# Patient Record
Sex: FEMALE | Race: WHITE | NOT HISPANIC OR LATINO | URBAN - METROPOLITAN AREA
[De-identification: names, ages, dates, MRNs, and addresses within clinical notes are randomized per-mention and may not be internally consistent; named-entity substitution may affect disease eponyms.]

---

## 2017-02-16 ENCOUNTER — APPOINTMENT (RX ONLY)
Dept: URBAN - METROPOLITAN AREA CLINIC 23 | Facility: CLINIC | Age: 42
Setting detail: DERMATOLOGY
End: 2017-02-16

## 2017-02-16 DIAGNOSIS — L81.4 OTHER MELANIN HYPERPIGMENTATION: ICD-10-CM

## 2017-02-16 DIAGNOSIS — D22 MELANOCYTIC NEVI: ICD-10-CM

## 2017-02-16 DIAGNOSIS — L82.1 OTHER SEBORRHEIC KERATOSIS: ICD-10-CM

## 2017-02-16 DIAGNOSIS — L20.89 OTHER ATOPIC DERMATITIS: ICD-10-CM

## 2017-02-16 DIAGNOSIS — D18.0 HEMANGIOMA: ICD-10-CM

## 2017-02-16 PROBLEM — L30.9 DERMATITIS, UNSPECIFIED: Status: ACTIVE | Noted: 2017-02-16

## 2017-02-16 PROBLEM — L29.8 OTHER PRURITUS: Status: ACTIVE | Noted: 2017-02-16

## 2017-02-16 PROBLEM — D18.01 HEMANGIOMA OF SKIN AND SUBCUTANEOUS TISSUE: Status: ACTIVE | Noted: 2017-02-16

## 2017-02-16 PROBLEM — D22.5 MELANOCYTIC NEVI OF TRUNK: Status: ACTIVE | Noted: 2017-02-16

## 2017-02-16 PROBLEM — F41.9 ANXIETY DISORDER, UNSPECIFIED: Status: ACTIVE | Noted: 2017-02-16

## 2017-02-16 PROCEDURE — 99213 OFFICE O/P EST LOW 20 MIN: CPT

## 2017-02-16 PROCEDURE — ? COUNSELING

## 2017-02-16 ASSESSMENT — LOCATION SIMPLE DESCRIPTION DERM
LOCATION SIMPLE: LEFT SHOULDER
LOCATION SIMPLE: RIGHT THIGH
LOCATION SIMPLE: RIGHT SHOULDER
LOCATION SIMPLE: ABDOMEN
LOCATION SIMPLE: LEFT PRETIBIAL REGION
LOCATION SIMPLE: UPPER BACK
LOCATION SIMPLE: LEFT ZYGOMA

## 2017-02-16 ASSESSMENT — LOCATION DETAILED DESCRIPTION DERM
LOCATION DETAILED: LEFT PROXIMAL PRETIBIAL REGION
LOCATION DETAILED: INFERIOR THORACIC SPINE
LOCATION DETAILED: LEFT RIB CAGE
LOCATION DETAILED: PERIUMBILICAL SKIN
LOCATION DETAILED: LEFT CENTRAL ZYGOMA
LOCATION DETAILED: RIGHT POSTERIOR SHOULDER
LOCATION DETAILED: LEFT POSTERIOR SHOULDER
LOCATION DETAILED: RIGHT ANTERIOR DISTAL THIGH

## 2017-02-16 ASSESSMENT — LOCATION ZONE DERM
LOCATION ZONE: TRUNK
LOCATION ZONE: ARM
LOCATION ZONE: FACE
LOCATION ZONE: LEG

## 2017-06-27 ENCOUNTER — APPOINTMENT (RX ONLY)
Dept: URBAN - METROPOLITAN AREA CLINIC 23 | Facility: CLINIC | Age: 42
Setting detail: DERMATOLOGY
End: 2017-06-27

## 2017-06-27 DIAGNOSIS — L57.0 ACTINIC KERATOSIS: ICD-10-CM

## 2017-06-27 DIAGNOSIS — L82.1 OTHER SEBORRHEIC KERATOSIS: ICD-10-CM

## 2017-06-27 PROCEDURE — ? PRESCRIPTION

## 2017-06-27 PROCEDURE — 99212 OFFICE O/P EST SF 10 MIN: CPT

## 2017-06-27 PROCEDURE — ? COUNSELING

## 2017-06-27 RX ORDER — FLUOROURACIL 5 MG/G
CREAM TOPICAL
Qty: 1 | Refills: 2 | Status: ERX | COMMUNITY
Start: 2017-06-27

## 2017-06-27 RX ADMIN — FLUOROURACIL: 5 CREAM TOPICAL at 19:04

## 2017-06-27 ASSESSMENT — LOCATION SIMPLE DESCRIPTION DERM
LOCATION SIMPLE: RIGHT FOREHEAD
LOCATION SIMPLE: RIGHT FOREARM
LOCATION SIMPLE: LEFT FOREHEAD

## 2017-06-27 ASSESSMENT — LOCATION DETAILED DESCRIPTION DERM
LOCATION DETAILED: RIGHT INFERIOR LATERAL FOREHEAD
LOCATION DETAILED: LEFT INFERIOR FOREHEAD
LOCATION DETAILED: RIGHT PROXIMAL DORSAL FOREARM

## 2017-06-27 ASSESSMENT — LOCATION ZONE DERM
LOCATION ZONE: ARM
LOCATION ZONE: FACE

## 2017-10-12 ENCOUNTER — APPOINTMENT (RX ONLY)
Dept: URBAN - METROPOLITAN AREA CLINIC 349 | Facility: CLINIC | Age: 42
Setting detail: DERMATOLOGY
End: 2017-10-12

## 2017-10-12 DIAGNOSIS — L82.1 OTHER SEBORRHEIC KERATOSIS: ICD-10-CM

## 2017-10-12 DIAGNOSIS — L82.0 INFLAMED SEBORRHEIC KERATOSIS: ICD-10-CM

## 2017-10-12 DIAGNOSIS — L57.0 ACTINIC KERATOSIS: ICD-10-CM

## 2017-10-12 DIAGNOSIS — Z71.89 OTHER SPECIFIED COUNSELING: ICD-10-CM

## 2017-10-12 DIAGNOSIS — L21.8 OTHER SEBORRHEIC DERMATITIS: ICD-10-CM

## 2017-10-12 DIAGNOSIS — Z80.8 FAMILY HISTORY OF MALIGNANT NEOPLASM OF OTHER ORGANS OR SYSTEMS: ICD-10-CM

## 2017-10-12 PROCEDURE — 17000 DESTRUCT PREMALG LESION: CPT | Mod: 59

## 2017-10-12 PROCEDURE — 99202 OFFICE O/P NEW SF 15 MIN: CPT | Mod: 25

## 2017-10-12 PROCEDURE — 17110 DESTRUCTION B9 LES UP TO 14: CPT

## 2017-10-12 PROCEDURE — ? LIQUID NITROGEN

## 2017-10-12 PROCEDURE — ? COUNSELING

## 2017-10-12 PROCEDURE — 17003 DESTRUCT PREMALG LES 2-14: CPT

## 2017-10-12 PROCEDURE — ? RECOMMENDATIONS

## 2017-10-12 ASSESSMENT — LOCATION DETAILED DESCRIPTION DERM
LOCATION DETAILED: LEFT INFERIOR LATERAL FOREHEAD
LOCATION DETAILED: RIGHT SUPERIOR MEDIAL FOREHEAD
LOCATION DETAILED: LEFT SUPERIOR PARIETAL SCALP
LOCATION DETAILED: RIGHT FOREHEAD
LOCATION DETAILED: UPPER STERNUM
LOCATION DETAILED: RIGHT INFERIOR MEDIAL MIDBACK
LOCATION DETAILED: RIGHT CENTRAL EYEBROW
LOCATION DETAILED: LEFT SUPERIOR CENTRAL MALAR CHEEK
LOCATION DETAILED: EPIGASTRIC SKIN
LOCATION DETAILED: LEFT SUPERIOR FOREHEAD
LOCATION DETAILED: RIGHT CENTRAL ZYGOMA

## 2017-10-12 ASSESSMENT — LOCATION ZONE DERM
LOCATION ZONE: TRUNK
LOCATION ZONE: FACE
LOCATION ZONE: SCALP

## 2017-10-12 ASSESSMENT — LOCATION SIMPLE DESCRIPTION DERM
LOCATION SIMPLE: RIGHT LOWER BACK
LOCATION SIMPLE: RIGHT ZYGOMA
LOCATION SIMPLE: ABDOMEN
LOCATION SIMPLE: RIGHT EYEBROW
LOCATION SIMPLE: SCALP
LOCATION SIMPLE: LEFT FOREHEAD
LOCATION SIMPLE: RIGHT FOREHEAD
LOCATION SIMPLE: LEFT CHEEK
LOCATION SIMPLE: CHEST

## 2017-10-12 ASSESSMENT — PAIN INTENSITY VAS: HOW INTENSE IS YOUR PAIN 0 BEING NO PAIN, 10 BEING THE MOST SEVERE PAIN POSSIBLE?: NO PAIN

## 2017-10-12 NOTE — PROCEDURE: LIQUID NITROGEN
Medical Necessity Clause: This procedure was medically necessary because the lesions that were treated were:
Number Of Freeze-Thaw Cycles: 2 freeze-thaw cycles
Add 52 Modifier (Optional): no
Detail Level: Detailed
Post-Care Instructions: I reviewed with the patient in detail post-care instructions. Patient is to wear sunprotection, and avoid picking at any of the treated lesions. Pt may apply Vaseline to crusted or scabbing areas.
Include Z78.9 (Other Specified Conditions Influencing Health Status) As An Associated Diagnosis?: Yes
Consent: The patient's consent was obtained including but not limited to risks of crusting, scabbing, blistering, scarring, darker or lighter pigmentary change, recurrence, incomplete removal and infection.
Duration Of Freeze Thaw-Cycle (Seconds): 3
Number Of Freeze-Thaw Cycles: 1 freeze-thaw cycle
Medical Necessity Information: It is in your best interest to select a reason for this procedure from the list below. All of these items fulfill various CMS LCD requirements except the new and changing color options.
Detail Level: Zone

## 2017-11-02 ENCOUNTER — HOSPITAL ENCOUNTER (OUTPATIENT)
Dept: MRI IMAGING | Age: 42
Discharge: HOME | End: 2017-11-02
Attending: OBSTETRICS & GYNECOLOGY
Payer: COMMERCIAL

## 2017-11-02 DIAGNOSIS — R92.2 DENSE BREASTS: ICD-10-CM

## 2017-11-02 DIAGNOSIS — Z80.3 FAMILY HISTORY OF BREAST CANCER: ICD-10-CM

## 2017-11-02 PROCEDURE — A9577 INJ MULTIHANCE: HCPCS

## 2017-11-02 PROCEDURE — 74011250636 HC RX REV CODE- 250/636

## 2017-11-02 PROCEDURE — 77059 MRI BREAST BI W WO CONT: CPT

## 2017-11-02 PROCEDURE — 74011000258 HC RX REV CODE- 258

## 2017-11-02 RX ORDER — SODIUM CHLORIDE 0.9 % (FLUSH) 0.9 %
10 SYRINGE (ML) INJECTION
Status: COMPLETED | OUTPATIENT
Start: 2017-11-02 | End: 2017-11-02

## 2017-11-02 RX ADMIN — Medication 10 ML: at 12:33

## 2017-11-02 RX ADMIN — SODIUM CHLORIDE 100 ML: 900 INJECTION, SOLUTION INTRAVENOUS at 12:33

## 2017-11-02 RX ADMIN — GADOBENATE DIMEGLUMINE 14 ML: 529 INJECTION, SOLUTION INTRAVENOUS at 12:33

## 2018-10-01 ENCOUNTER — APPOINTMENT (RX ONLY)
Dept: URBAN - METROPOLITAN AREA CLINIC 349 | Facility: CLINIC | Age: 43
Setting detail: DERMATOLOGY
End: 2018-10-01

## 2018-10-01 DIAGNOSIS — L57.0 ACTINIC KERATOSIS: ICD-10-CM

## 2018-10-01 DIAGNOSIS — Z80.8 FAMILY HISTORY OF MALIGNANT NEOPLASM OF OTHER ORGANS OR SYSTEMS: ICD-10-CM

## 2018-10-01 DIAGNOSIS — L82.0 INFLAMED SEBORRHEIC KERATOSIS: ICD-10-CM

## 2018-10-01 DIAGNOSIS — L21.8 OTHER SEBORRHEIC DERMATITIS: ICD-10-CM

## 2018-10-01 DIAGNOSIS — L82.1 OTHER SEBORRHEIC KERATOSIS: ICD-10-CM

## 2018-10-01 PROCEDURE — 17003 DESTRUCT PREMALG LES 2-14: CPT

## 2018-10-01 PROCEDURE — 17000 DESTRUCT PREMALG LESION: CPT | Mod: 59

## 2018-10-01 PROCEDURE — ? COUNSELING

## 2018-10-01 PROCEDURE — ? LIQUID NITROGEN

## 2018-10-01 PROCEDURE — 99213 OFFICE O/P EST LOW 20 MIN: CPT | Mod: 25

## 2018-10-01 PROCEDURE — ? PRESCRIPTION

## 2018-10-01 PROCEDURE — 17110 DESTRUCTION B9 LES UP TO 14: CPT

## 2018-10-01 RX ORDER — KETOCONAZOLE 20.5 MG/ML
SHAMPOO, SUSPENSION TOPICAL
Qty: 1 | Refills: 4 | Status: ERX | COMMUNITY
Start: 2018-10-01

## 2018-10-01 RX ADMIN — KETOCONAZOLE: 20.5 SHAMPOO, SUSPENSION TOPICAL at 13:20

## 2018-10-01 ASSESSMENT — LOCATION SIMPLE DESCRIPTION DERM
LOCATION SIMPLE: CHEST
LOCATION SIMPLE: RIGHT TEMPLE
LOCATION SIMPLE: RIGHT ANTERIOR NECK
LOCATION SIMPLE: RIGHT THIGH
LOCATION SIMPLE: LEFT FOREHEAD
LOCATION SIMPLE: RIGHT THIGH
LOCATION SIMPLE: RIGHT SCALP
LOCATION SIMPLE: LEFT CHEEK
LOCATION SIMPLE: RIGHT ZYGOMA
LOCATION SIMPLE: CHEST
LOCATION SIMPLE: RIGHT FOREHEAD
LOCATION SIMPLE: SCALP
LOCATION SIMPLE: LEFT SCALP

## 2018-10-01 ASSESSMENT — LOCATION DETAILED DESCRIPTION DERM
LOCATION DETAILED: RIGHT SUPERIOR PARIETAL SCALP
LOCATION DETAILED: RIGHT ANTERIOR DISTAL THIGH
LOCATION DETAILED: RIGHT CENTRAL ZYGOMA
LOCATION DETAILED: LEFT INFERIOR CENTRAL MALAR CHEEK
LOCATION DETAILED: LEFT MEDIAL FOREHEAD
LOCATION DETAILED: RIGHT CLAVICULAR NECK
LOCATION DETAILED: STERNAL NOTCH
LOCATION DETAILED: RIGHT MEDIAL FOREHEAD
LOCATION DETAILED: RIGHT ANTERIOR PROXIMAL THIGH
LOCATION DETAILED: UPPER STERNUM
LOCATION DETAILED: RIGHT INFERIOR FOREHEAD
LOCATION DETAILED: LEFT MEDIAL SUPERIOR CHEST
LOCATION DETAILED: LEFT FOREHEAD
LOCATION DETAILED: LEFT INFERIOR MEDIAL FOREHEAD
LOCATION DETAILED: RIGHT MEDIAL SUPERIOR CHEST
LOCATION DETAILED: LEFT CENTRAL FRONTAL SCALP
LOCATION DETAILED: RIGHT ANTERIOR PROXIMAL THIGH
LOCATION DETAILED: RIGHT MEDIAL FRONTAL SCALP
LOCATION DETAILED: UPPER STERNUM
LOCATION DETAILED: RIGHT MID TEMPLE

## 2018-10-01 ASSESSMENT — LOCATION ZONE DERM
LOCATION ZONE: TRUNK
LOCATION ZONE: NECK
LOCATION ZONE: LEG
LOCATION ZONE: SCALP
LOCATION ZONE: FACE
LOCATION ZONE: TRUNK
LOCATION ZONE: LEG

## 2018-10-01 NOTE — PROCEDURE: LIQUID NITROGEN
Number Of Freeze-Thaw Cycles: 2 freeze-thaw cycles
Consent: The patient's consent was obtained including but not limited to risks of crusting, scabbing, blistering, scarring, darker or lighter pigmentary change, recurrence, incomplete removal and infection.
Duration Of Freeze Thaw-Cycle (Seconds): 3
Medical Necessity Clause: This procedure was medically necessary because the lesions that were treated were:
Post-Care Instructions: I reviewed with the patient in detail post-care instructions. Patient is to wear sunprotection, and avoid picking at any of the treated lesions. Pt may apply Vaseline to crusted or scabbing areas.
Number Of Freeze-Thaw Cycles: 1 freeze-thaw cycle
Detail Level: Zone
Render Post-Care Instructions In Note?: no
Medical Necessity Information: It is in your best interest to select a reason for this procedure from the list below. All of these items fulfill various CMS LCD requirements except the new and changing color options.
Include Z78.9 (Other Specified Conditions Influencing Health Status) As An Associated Diagnosis?: Yes
Detail Level: Detailed

## 2019-01-02 RX ORDER — SODIUM CHLORIDE 0.9 % (FLUSH) 0.9 %
5-10 SYRINGE (ML) INJECTION AS NEEDED
Status: CANCELLED | OUTPATIENT
Start: 2019-01-02

## 2019-01-02 RX ORDER — SODIUM CHLORIDE 0.9 % (FLUSH) 0.9 %
5-10 SYRINGE (ML) INJECTION EVERY 8 HOURS
Status: CANCELLED | OUTPATIENT
Start: 2019-01-02

## 2019-01-07 NOTE — H&P
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 She complains of pain around the base of the left thumb, which has bothered her for several years, but has gotten progressively worse, particularly over the last year. It hurts with grasping, gripping and twisting. She has tried using a splint some in the past.  She denies any triggering or catching of the thumb or any of the fingers. She is not having any numbness or tingling in the hand. She has not had any specific injuries. She saw Dr. Kristen Rueda at the [facility] [place] and is here for another evaluation or opinion. PMH, SH & ROS:  This form has been filled out, reviewed, signed and placed in the patient's chart. I reviewed pertinent positive and negative responses and tried to associate them with the musculoskeletal problem of the patient. Other positive responses were deferred to the patient's primary care physician. Review of systems is negative. The patient drinks some. She does not smoke. She is generally healthy. She does have a history of some arthritis. She has had a breast augmentation in 2013 and a  in 2006. CURRENT MEDICATIONS:  None. ALLERGIES:  None. PE:  The patient is an alert and oriented, generally healthy-appearing, middle-aged, white female. Exam of the left wrist and hand shows the patient to have dorsal subluxation and prominence at the base of the thumb metacarpal with positive grind test and tenderness to manipulation and palpation of the TM joint. Her MP joint is stable and does not hyperextend. She has no triggering of the thumb. Heavenly Duverney test is negative. The wrist has good flexion and extension. Fingers all flex and extend fully without any triggering or catching.   Circulation is good with a palpable radial pulse at the left wrist.  Neurologic exam is intact to light touch in the radial, ulnar and median nerve distributions. Skin and nails are normal.      X-RAYS:  AP, lateral, and Elvis view of the left wrist today, 11/29/2018, show the patient to have significant arthritic change of the left TM joint. There is dorsal subluxation, joint narrowing, and spurring around the joint. The remainder of the wrist and hand appears normal without significant arthritic change. Radiographic Impression:  Moderately severe trapeziometacarpal arthritis of the left trapeziometacarpal (TM)  joint. DISPOSITION:  The problem was discussed with the patient along with various treatment alternatives including possibly oral anti-inflammatories, topical NSAIDs such as Voltaren Gel, splinting, steroid injections, and possibly surgery. The patient has already decided that she does not want to really pursue serial steroid injections. She has tried the brace and splinting and finds that inconvenient. She is considering surgical treatment. I have discussed the procedure with her, doing this as an outpatient under general and axillary block, and that the recovery time is approximately 3 months or so. She is going to think about whether or not to proceed with surgery. If she decides she would like to do so with me, she is going to call back and schedule that with my medical assistant.

## 2019-01-10 ENCOUNTER — ANESTHESIA EVENT (OUTPATIENT)
Dept: SURGERY | Age: 44
End: 2019-01-10
Payer: COMMERCIAL

## 2019-01-11 ENCOUNTER — ANESTHESIA (OUTPATIENT)
Dept: SURGERY | Age: 44
End: 2019-01-11
Payer: COMMERCIAL

## 2019-01-11 ENCOUNTER — HOSPITAL ENCOUNTER (OUTPATIENT)
Age: 44
Setting detail: OUTPATIENT SURGERY
Discharge: HOME | End: 2019-01-11
Attending: ORTHOPAEDIC SURGERY | Admitting: ORTHOPAEDIC SURGERY
Payer: COMMERCIAL

## 2019-01-11 VITALS
TEMPERATURE: 97.8 F | RESPIRATION RATE: 16 BRPM | HEART RATE: 57 BPM | WEIGHT: 149 LBS | OXYGEN SATURATION: 95 % | SYSTOLIC BLOOD PRESSURE: 107 MMHG | BODY MASS INDEX: 25.58 KG/M2 | DIASTOLIC BLOOD PRESSURE: 63 MMHG

## 2019-01-11 DIAGNOSIS — G89.18 POST-OP PAIN: Primary | ICD-10-CM

## 2019-01-11 LAB
HCG UR QL: NEGATIVE
HCG UR QL: NEGATIVE

## 2019-01-11 PROCEDURE — 77030004434 HC BUR RND STRY -B: Performed by: ORTHOPAEDIC SURGERY

## 2019-01-11 PROCEDURE — 74011250636 HC RX REV CODE- 250/636: Performed by: ANESTHESIOLOGY

## 2019-01-11 PROCEDURE — 76210000020 HC REC RM PH II FIRST 0.5 HR: Performed by: ORTHOPAEDIC SURGERY

## 2019-01-11 PROCEDURE — 77030018836 HC SOL IRR NACL ICUM -A: Performed by: ORTHOPAEDIC SURGERY

## 2019-01-11 PROCEDURE — 76010010054 HC POST OP PAIN BLOCK: Performed by: ORTHOPAEDIC SURGERY

## 2019-01-11 PROCEDURE — 77030002933 HC SUT MCRYL J&J -A: Performed by: ORTHOPAEDIC SURGERY

## 2019-01-11 PROCEDURE — 77030032296: Performed by: ORTHOPAEDIC SURGERY

## 2019-01-11 PROCEDURE — 77030002916 HC SUT ETHLN J&J -A: Performed by: ORTHOPAEDIC SURGERY

## 2019-01-11 PROCEDURE — 77030006788 HC BLD SAW OSC STRY -B: Performed by: ORTHOPAEDIC SURGERY

## 2019-01-11 PROCEDURE — 76010000965 HC SPECIAL PROCEDURE PERI-OP: Performed by: ORTHOPAEDIC SURGERY

## 2019-01-11 PROCEDURE — 81025 URINE PREGNANCY TEST: CPT

## 2019-01-11 PROCEDURE — 77030002966 HC SUT PDS J&J -A: Performed by: ORTHOPAEDIC SURGERY

## 2019-01-11 PROCEDURE — 77030025157 HC CUF TRNQT 1 ZIMM -B: Performed by: ORTHOPAEDIC SURGERY

## 2019-01-11 PROCEDURE — 76010000161 HC OR TIME 1 TO 1.5 HR INTENSV-TIER 1: Performed by: ORTHOPAEDIC SURGERY

## 2019-01-11 PROCEDURE — 77030003602 HC NDL NRV BLK BBMI -B: Performed by: ANESTHESIOLOGY

## 2019-01-11 PROCEDURE — 76210000063 HC OR PH I REC FIRST 0.5 HR: Performed by: ORTHOPAEDIC SURGERY

## 2019-01-11 PROCEDURE — 76060000033 HC ANESTHESIA 1 TO 1.5 HR: Performed by: ORTHOPAEDIC SURGERY

## 2019-01-11 PROCEDURE — 74011250636 HC RX REV CODE- 250/636: Performed by: ORTHOPAEDIC SURGERY

## 2019-01-11 PROCEDURE — 77030002912 HC SUT ETHBND J&J -A: Performed by: ORTHOPAEDIC SURGERY

## 2019-01-11 PROCEDURE — 74011250636 HC RX REV CODE- 250/636

## 2019-01-11 PROCEDURE — A4565 SLINGS: HCPCS | Performed by: ORTHOPAEDIC SURGERY

## 2019-01-11 RX ORDER — HYDROMORPHONE HYDROCHLORIDE 2 MG/ML
0.5 INJECTION, SOLUTION INTRAMUSCULAR; INTRAVENOUS; SUBCUTANEOUS
Status: DISCONTINUED | OUTPATIENT
Start: 2019-01-11 | End: 2019-01-11 | Stop reason: HOSPADM

## 2019-01-11 RX ORDER — LIDOCAINE HYDROCHLORIDE 20 MG/ML
INJECTION, SOLUTION EPIDURAL; INFILTRATION; INTRACAUDAL; PERINEURAL AS NEEDED
Status: DISCONTINUED | OUTPATIENT
Start: 2019-01-11 | End: 2019-01-11 | Stop reason: HOSPADM

## 2019-01-11 RX ORDER — OXYCODONE HYDROCHLORIDE 5 MG/1
10 TABLET ORAL
Status: DISCONTINUED | OUTPATIENT
Start: 2019-01-11 | End: 2019-01-11 | Stop reason: HOSPADM

## 2019-01-11 RX ORDER — OXYCODONE HYDROCHLORIDE 5 MG/1
5 TABLET ORAL
Status: DISCONTINUED | OUTPATIENT
Start: 2019-01-11 | End: 2019-01-11 | Stop reason: HOSPADM

## 2019-01-11 RX ORDER — SODIUM CHLORIDE, SODIUM LACTATE, POTASSIUM CHLORIDE, CALCIUM CHLORIDE 600; 310; 30; 20 MG/100ML; MG/100ML; MG/100ML; MG/100ML
75 INJECTION, SOLUTION INTRAVENOUS CONTINUOUS
Status: DISCONTINUED | OUTPATIENT
Start: 2019-01-11 | End: 2019-01-11 | Stop reason: HOSPADM

## 2019-01-11 RX ORDER — NALOXONE HYDROCHLORIDE 0.4 MG/ML
0.1 INJECTION, SOLUTION INTRAMUSCULAR; INTRAVENOUS; SUBCUTANEOUS
Status: DISCONTINUED | OUTPATIENT
Start: 2019-01-11 | End: 2019-01-11 | Stop reason: HOSPADM

## 2019-01-11 RX ORDER — CEFAZOLIN SODIUM/WATER 2 G/20 ML
2 SYRINGE (ML) INTRAVENOUS ONCE
Status: COMPLETED | OUTPATIENT
Start: 2019-01-11 | End: 2019-01-11

## 2019-01-11 RX ORDER — FENTANYL CITRATE 50 UG/ML
100 INJECTION, SOLUTION INTRAMUSCULAR; INTRAVENOUS ONCE
Status: COMPLETED | OUTPATIENT
Start: 2019-01-11 | End: 2019-01-11

## 2019-01-11 RX ORDER — PROPOFOL 10 MG/ML
INJECTION, EMULSION INTRAVENOUS
Status: DISCONTINUED | OUTPATIENT
Start: 2019-01-11 | End: 2019-01-11 | Stop reason: HOSPADM

## 2019-01-11 RX ORDER — LIDOCAINE HYDROCHLORIDE 10 MG/ML
0.1 INJECTION INFILTRATION; PERINEURAL AS NEEDED
Status: DISCONTINUED | OUTPATIENT
Start: 2019-01-11 | End: 2019-01-11 | Stop reason: HOSPADM

## 2019-01-11 RX ORDER — FLUMAZENIL 0.1 MG/ML
0.2 INJECTION INTRAVENOUS AS NEEDED
Status: DISCONTINUED | OUTPATIENT
Start: 2019-01-11 | End: 2019-01-11 | Stop reason: HOSPADM

## 2019-01-11 RX ORDER — DIPHENHYDRAMINE HYDROCHLORIDE 50 MG/ML
12.5 INJECTION, SOLUTION INTRAMUSCULAR; INTRAVENOUS
Status: DISCONTINUED | OUTPATIENT
Start: 2019-01-11 | End: 2019-01-11 | Stop reason: HOSPADM

## 2019-01-11 RX ORDER — MIDAZOLAM HYDROCHLORIDE 1 MG/ML
2 INJECTION, SOLUTION INTRAMUSCULAR; INTRAVENOUS ONCE
Status: COMPLETED | OUTPATIENT
Start: 2019-01-11 | End: 2019-01-11

## 2019-01-11 RX ORDER — MIDAZOLAM HYDROCHLORIDE 1 MG/ML
2 INJECTION, SOLUTION INTRAMUSCULAR; INTRAVENOUS
Status: DISCONTINUED | OUTPATIENT
Start: 2019-01-11 | End: 2019-01-11 | Stop reason: HOSPADM

## 2019-01-11 RX ORDER — HYDROMORPHONE HYDROCHLORIDE 2 MG/1
2 TABLET ORAL
Qty: 30 TAB | Refills: 0 | Status: SHIPPED | OUTPATIENT
Start: 2019-01-11 | End: 2021-03-04 | Stop reason: CLARIF

## 2019-01-11 RX ADMIN — PROPOFOL 140 MCG/KG/MIN: 10 INJECTION, EMULSION INTRAVENOUS at 14:02

## 2019-01-11 RX ADMIN — Medication 2 G: at 14:00

## 2019-01-11 RX ADMIN — MIDAZOLAM HYDROCHLORIDE 2 MG: 2 INJECTION, SOLUTION INTRAMUSCULAR; INTRAVENOUS at 12:48

## 2019-01-11 RX ADMIN — SODIUM CHLORIDE, SODIUM LACTATE, POTASSIUM CHLORIDE, AND CALCIUM CHLORIDE 75 ML/HR: 600; 310; 30; 20 INJECTION, SOLUTION INTRAVENOUS at 12:05

## 2019-01-11 RX ADMIN — LIDOCAINE HYDROCHLORIDE 60 MG: 20 INJECTION, SOLUTION EPIDURAL; INFILTRATION; INTRACAUDAL; PERINEURAL at 14:02

## 2019-01-11 RX ADMIN — FENTANYL CITRATE 100 MCG: 50 INJECTION INTRAMUSCULAR; INTRAVENOUS at 12:48

## 2019-01-11 NOTE — ANESTHESIA PREPROCEDURE EVALUATION
Anesthetic History No history of anesthetic complications Review of Systems / Medical History Patient summary reviewed and pertinent labs reviewed Pulmonary Within defined limits Neuro/Psych Within defined limits Cardiovascular Within defined limits Exercise tolerance: >4 METS 
  
GI/Hepatic/Renal 
Within defined limits Endo/Other Arthritis Other Findings Physical Exam 
 
Airway Mallampati: I 
TM Distance: 4 - 6 cm Neck ROM: normal range of motion Mouth opening: Normal 
 
 Cardiovascular Rhythm: regular Rate: normal 
 
 
 
 Dental 
No notable dental hx Pulmonary Breath sounds clear to auscultation Abdominal 
 
 
 
 Other Findings Anesthetic Plan ASA: 1 Anesthesia type: total IV anesthesia Post-op pain plan if not by surgeon: peripheral nerve block single Anesthetic plan and risks discussed with: Patient and Spouse

## 2019-01-11 NOTE — ANESTHESIA POSTPROCEDURE EVALUATION
Procedure(s): LEFT WRIST TRAPEZIO METACARPAL ARTHROPLASTY WITH FCR TENDON TRANSFER. Anesthesia Post Evaluation Multimodal analgesia: multimodal analgesia used between 6 hours prior to anesthesia start to PACU discharge Patient location during evaluation: bedside Patient participation: complete - patient participated Level of consciousness: awake and responsive to light touch Pain management: adequate Airway patency: patent Anesthetic complications: no 
Cardiovascular status: acceptable, hemodynamically stable, blood pressure returned to baseline and stable Respiratory status: acceptable, unassisted, spontaneous ventilation and nonlabored ventilation Hydration status: acceptable Post anesthesia nausea and vomiting:  controlled Visit Vitals /63 (BP 1 Location: Right arm, BP Patient Position: At rest) Pulse 80 Temp 36.6 °C (97.8 °F) Resp 17 Wt 67.6 kg (149 lb) SpO2 99% BMI 25.58 kg/m²

## 2019-01-11 NOTE — DISCHARGE INSTRUCTIONS
POST OP INSTRUCTIONS      1. Patient has appointment for 1/21/19 at 2:40 PM at the [facility]. 2.  For problems call Dr Ellis Horne, [facility],  [phone]          Appointments only,  [phone]    3. Ice and elevate the surgical site. 4.  Keep splints and dressing dry and intact. 5.  If able to tolerate, take   Acetaminophen 325 mg  2 every 4 hrs   And                                               Ibuprofen 200 mg   3 every 6 hrs   OR   Aleve 220 mg 2 every 12 hrs to help with pain                                                   ( Do not take Ibuprofen or Aleve if taking any other anti inflamatory                                                    drug, NSAID, or anti arthritis drug or if taking blood thinners.)                                                 Vitamin C   500 mg  Once a day for 2 months. ACTIVITY  · As tolerated and as directed by your doctor. · Bathe or shower as directed by your doctor. DIET  · Clear liquids until no nausea or vomiting; then light diet for the first day. · Advance to regular diet on second day, unless your doctor orders otherwise. · If nausea and vomiting continues, call your doctor. PAIN  · Take pain medication as directed by your doctor. · Call your doctor if pain is NOT relieved by medication. · DO NOT take aspirin of blood thinners unless directed by your doctor. DRESSING CARE       CALL YOUR DOCTOR IF   · Excessive bleeding that does not stop after holding pressure over the area  · Temperature of 101 degrees F or above  · Excessive redness, swelling or bruising, and/ or green or yellow, smelly discharge from incision    AFTER ANESTHESIA   · For the first 24 hours: DO NOT Drive, Drink alcoholic beverages, or Make important decisions. · Be aware of dizziness following anesthesia and while taking pain medication.      APPOINTMENT DATE/ TIME    YOUR DOCTOR'S PHONE NUMBER       DISCHARGE SUMMARY from Nurse    PATIENT INSTRUCTIONS:    After general anesthesia or intravenous sedation, for 24 hours or while taking prescription Narcotics:  · Limit your activities  · Do not drive and operate hazardous machinery  · Do not make important personal or business decisions  · Do  not drink alcoholic beverages  · If you have not urinated within 8 hours after discharge, please contact your surgeon on call. *  Please give a list of your current medications to your Primary Care Provider. *  Please update this list whenever your medications are discontinued, doses are      changed, or new medications (including over-the-counter products) are added. *  Please carry medication information at all times in case of emergency situations. These are general instructions for a healthy lifestyle:    No smoking/ No tobacco products/ Avoid exposure to second hand smoke    Surgeon General's Warning:  Quitting smoking now greatly reduces serious risk to your health. Obesity, smoking, and sedentary lifestyle greatly increases your risk for illness    A healthy diet, regular physical exercise & weight monitoring are important for maintaining a healthy lifestyle    You may be retaining fluid if you have a history of heart failure or if you experience any of the following symptoms:  Weight gain of 3 pounds or more overnight or 5 pounds in a week, increased swelling in our hands or feet or shortness of breath while lying flat in bed. Please call your doctor as soon as you notice any of these symptoms; do not wait until your next office visit. Recognize signs and symptoms of STROKE:    F-face looks uneven    A-arms unable to move or move unevenly    S-speech slurred or non-existent    T-time-call 911 as soon as signs and symptoms begin-DO NOT go       Back to bed or wait to see if you get better-TIME IS BRAIN.

## 2019-01-11 NOTE — ANESTHESIA PROCEDURE NOTES
Peripheral Block Start time: 1/11/2019 12:49 PM 
End time: 1/11/2019 12:55 PM 
Performed by: Tori Moss MD 
Authorized by: Tori Moss MD  
 
 
Pre-procedure: Indications: at surgeon's request and post-op pain management Preanesthetic Checklist: patient identified, risks and benefits discussed, site marked, timeout performed, anesthesia consent given and patient being monitored Timeout Time: 12:48 Block Type:  
Block Type:  Supraclavicular Laterality:  Left Monitoring:  Standard ASA monitoring, oxygen, responsive to questions, continuous pulse ox, frequent vital sign checks and heart rate Injection Technique:  Single shot Procedures: ultrasound guided and nerve stimulator Patient Position: supine (30 degree upright) Prep: chlorhexidine Location:  Supraclavicular Needle Type:  Stimuplex Needle Gauge:  22 G Needle Localization:  Nerve stimulator and ultrasound guidance Motor Response: minimal motor response >0.4 mA Motor Response comment:  Twitch extinguished between 0.5-0.2 mA Assessment: 
Number of attempts:  1 Injection Assessment:  Incremental injection every 5 mL, negative aspiration for CSF, no paresthesia, ultrasound image on chart, local visualized surrounding nerve on ultrasound, negative aspiration for blood and no intravascular symptoms Patient tolerance:  Patient tolerated the procedure well with no immediate complications

## 2019-01-11 NOTE — OP NOTES
Trapezio-metacarpal Arthroplasty    Bria Mcdaniel        1/11/2019    Indications: This is a 37 yrs female with degenerative arthritis of the  left trapezio-metacarpal joint. The patient is admitted for surgery as conservative measures have failed. Pre-operative Diagnosis:  Unilateral primary osteoarthritis of first carpometacarpal joint, left hand [M18.12]    Post-operative Diagnosis: Unilateral primary osteoarthritis of first carpometacarpal joint, left hand [M18.12]    Procedure: 1. LEFT  TRAPEZIO-METACARPAL ARTHROPLASTY                       2.  TRANSFER OF THE LEFT  FLEXOR CARPI RADIALIS TENDON    Surgeon: Anna Louis MD    Anesthesia: Regional      Procedure Details: The patient was given a brachial plexus block in the pre-op area and then  taken to the operating suite. The patient was given pre-op intravenous Ancef. The left upper extremity was then prepped with Chloro-prep and draped in the usual sterile fashion. A time out was performed by the surgical team identifying the patient, surgeon, procedure and site. The extremity was exsanguinated and a tourniquet inflated to 250 mm Hg around the upper arm. A curved incision was made along the radial aspect of the thumb metacarpal and then turned ulnarward at the wrist flexion crease to the FCR and then proximal for 1 cm. Small bleeders were electrocoagulated. The thenar muscles were retracted volarly. The periosteum and TM capsule was open along the radial aspect and retracted dorsally and volarly. The capsule was released off the trapezium both dorsally and volarly as far as possible. The FCR tendon was released from the bony overhang and protected. The trapezium was cut into 4 quadrants with a small oscillating saw and then removed piecemeal by releasing the deep ligament attachments as the bone was removed. The trapezium was noted to have arthritic spurring and loss of articular cartilage from the distal surface. The base of the thumb metacarpal showed loss of jt cartilage. The soft tissues were further stripped off the doral aspect of the base of the metacarpal and then a small cielo used to make an oval window in the dorsal cortex near the proximal edge and out thru the intramedullary canal.  Osteophytes were removed from the base of the metacarpal. The wound was irrigated to remove bone debris. The FCR was harvested. A 2.0 cm incision was made over the musculotendonous junction and the FCR identified. A 0 PDS suture was passed through the tendon and then the two free ends of the suture passed beneath the sheath into the distal incision with a [place] 100e tendon passer. The tendon was split longitudinally by pulling on the ends of the PDS suture. The Ulnar half of the tendon was transected proximally and the free end of the tendon pulled into the distal incision. The tendon was split to its insertion on the base of the index metacarpal.  Using a tag stitch through the end of the tendon it was passed through the base of the metacarpal and out through the dorsal window. The assistant held the thumb abducted and distracted. The free end of the tendon was pulled proximally and sutured to itself with 3 horizontal mattress stitches of 2- 0 Ethibond. The remaining portion of the tendon was sutured into a bolus with 2-0 PDS and placed into the space where the trapezium had been. The wound was again irrigated. The joint capsule and ligaments were then closed snuggly around the base of the thumb metacarpal with inverted 3-0 Ethibond and  2-0 PDS sutures. The thenar muscles were repaired along with the FCR sheath. The skin was closed with 4-0 nylon. . A sterile bulky dressing was applied. The tourniquet was released. The patient was then transferred to the Recovery Room in stable condition.       Findings: Marked degenerative changes of the TM joint with dorsal subluxation, spurring and loss of articular cartilage.     Tourniquet Time:   Total Tourniquet Time Documented:  Upper Arm (Left) - 59 minutes  Total: Upper Arm (Left) - 59 minutes      Signed By: Anna Louis MD

## 2019-01-11 NOTE — INTERVAL H&P NOTE
H&P Update:  Gloria Galvez was seen and examined. Pt is alert and oriented. Chest: Clear w/o SOB. C/V:  RR&R    History and physical has been reviewed. The patient has been examined. There have been no significant clinical changes since the completion of the originally dated History and Physical.  Patient identified by surgeon; surgical site was confirmed by patient and surgeon. The patient is to have a Left TM arthroplasty with FCR tendon transfer.     Signed By: Ronna Villagomez., MD     January 11, 2019 1:04 PM

## 2019-02-11 ENCOUNTER — HOSPITAL ENCOUNTER (OUTPATIENT)
Dept: PHYSICAL THERAPY | Age: 44
Discharge: HOME | End: 2019-02-11
Payer: COMMERCIAL

## 2019-02-11 PROCEDURE — 97760 ORTHOTIC MGMT&TRAING 1ST ENC: CPT

## 2019-02-11 PROCEDURE — 97165 OT EVAL LOW COMPLEX 30 MIN: CPT

## 2019-02-11 NOTE — THERAPY EVALUATION
Zoie Bryan : 1975 Primary: [facility] Secondary:  [facility] [place] [id number] [facility], [address], [place] [address]. [phone]   [phone] OUTPATIENT OCCUPATIONAL THERAPY: Initial Assessment 2019 ICD-10: Treatment Diagnosis: Stiffness of left hand, not elsewhere classified (M25.642)Pain in left hand (S07.102) Precautions/Allergies:  
Patient has no known allergies. MD Orders: Evaluate and treat: custom molded TMA splint, ROM MEDICAL/REFERRING DIAGNOSIS:  
Unilateral primary osteoarthritis of first carpometacarpal joint, left hand [M18.12] DATE OF ONSET: several years ago DATE OF SURGERY: 2019 REFERRING PHYSICIAN: Clifford Arce MD 
RETURN PHYSICIAN APPOINTMENT: 4 weeks INITIAL ASSESSMENT:  Ms. Sherine Giordano presents with decreased functional use, strength and range of motion of her left hand and upper extremity that is affecting her independence with activities of daily living and ability to perform job tasks. I feel that Ms. Sherine Giordano will benefit from skilled occupational therapy to maximize the functional use of her hand and upper extremity in daily activities . PLAN OF CARE:  
PROBLEM LIST: 
1. Pain in left hand. 2. Decreased motion in left hand. 3. Decreased strength in left hand. INTERVENTIONS PLANNED: 
1. Modalities that may include fluidotherapy, paraffin, ultrasound, and light therapy. 2. Therapeutic exercise including a home exercise program. 
3. Manual therapy. 4. Therapeutic activities. TREATMENT PLAN: 
Effective Dates: 2019 TO 2019 (90 days). Frequency/Duration: 2 times a week for 90 DaysGOALS: (Goals have been discussed and agreed upon with patient.) Short-Term Functional Goals: Time Frame: 4 weeks 1. Decrease pain to 5 to allow patient to perform self care tasks.  
2. Increase motion in left hand by 20 degrees to improve functional use of upper extremity in ADL activities. 3. Increase strength in left hand by 10 pounds to allow patient to  and lift objects during self care activities. Discharge Goals: Time Frame: 12 weeks 1. Decrease pain to 2 to allow patient to perform all household  tasks. 2. Increase motion in left hand by 30 degreees to allow patient to perform all ADL activities. 3. Increase strength in left hand by 20 pounds to allow patient to , lift, hold, and carry heavy objects. Rehabilitation Potential For Stated Goals: Good Regarding Javi Asif Marcelo Cuba's therapy, I certify that the treatment plan above will be carried out by a therapist or under their direction. Thank you for this referral, Alexander Gomez, OT Referring Physician Signature: Bill Cazares MD _________________________  Date _________ The information in this section was collected on 2019 (except where otherwise noted). OCCUPATIONAL PROFILE & HISTORY:  
History of Present Injury/Illness (Reason for Referral): The patient has had increasing arthritis pain in her left thumb for several years. Past Medical History/Comorbidities: Ms. Theodore Banks  has a past medical history of Allergic rhinitis, Anxiety, Arthritis, Chicken pox, and Pneumonia. Ms. Theodore Banks  has a past surgical history that includes radha biopsy breast stereotactic (); hx  section (); hx breast biopsy (Right); hx breast augmentation; and implant breast silicone/eq. Social History/Living Environment:  
Home Environment: Private residence Prior Level of Function/Work/Activity: 
independent Dominant Side:  
      RIGHT Ambulatory/Rehab Services H2 Model Falls Risk Assessment Risk Factors: 
     No Risk Factors Identified Ability to Rise from Chair: 
     (0)  Ability to rise in a single movement Falls Prevention Plan: No modifications necessary Total: (5 or greater = High Risk): 0 ©2010 [facility] [place] [facility] Patent #2,209,476. Federal Law prohibits the replication, distribution or use without written permission from [facility] of [employer] Current Medications:   
Current Outpatient Medications:  
  HYDROmorphone (DILAUDID) 2 mg tablet, Take 1 Tab by mouth every three (3) hours as needed for Pain. Max Daily Amount: 16 mg., Disp: 30 Tab, Rfl: 0 
  CETIRIZINE HCL (ZYRTEC PO), Take 10 mg by mouth Daily (before breakfast). , Disp: , Rfl:   
Date Last Reviewed:  2/11/2019 Number of medical conditions (excluding presenting problem) that affect the Plan of Care: Brief history (0):  LOW COMPLEXITY ASSESSMENT OF OCCUPATIONAL PERFORMANCE:  
RANGE OF MOTION:    
· AROM: Measurements were not taken due to recent surgery. STRENGTH:  Not tested yet. SENSATION:  The patient reports tingling in her left thumb area. Physical Skills Involved: 1. Range of Motion 2. Strength 3. Sensation 4. Pain (acute) 5. Edema Cognitive Skills Affected (resulting in the inability to perform in a timely and safe manner): 1. none Psychosocial Skills Affected: 1. none Number of elements that affect the Plan of Care: 5+:  HIGH COMPLEXITY CLINICAL DECISION MAKING:  
Outcome Measure: Tool Used: Disabilities of the Arm, Shoulder and Hand (DASH) Questionnaire - Quick Version Score:  Initial: 47/55  Most Recent: X/55 (Date: -- ) Interpretation of Score: The DASH is designed to measure the activities of daily living in person's with upper extremity dysfunction or pain. Each section is scored on a 1-5 scale, 5 representing the greatest disability. The scores of each section are added together for a total score of 55. Score 11 12-19 20-28 29-37 38-45 46-54 55 Modifier CH CI CJ CK CL CM CN Medical Necessity: · The patient is expected to increase motion, strength and function in her left hand improving her ability to perform ADL, and household activities. Reason for Services/Other Comments: · The patient has limited motion, strength and function in her left hand. Lexis Summers Clinical Decision-Making Assessment:    
Clinical Decision-Making: LOW COMPLEXITY  
TREATMENT:  
(In addition to Assessment/Re-Assessment sessions the following treatments were rendered) Pre-treatment Symptoms/Complaints:  Pain and stiffness in left hand and wrist. 
Pain: Initial:  
Pain Intensity 1: 4(increasing to 8 when most intense) Pain Location 1: Hand Pain Orientation 1: Left  Post Session:  3 The patient was provided with a fabricated protective splint for her left thumb and wrist. ( 15 minutes ) 2/11/2019 Treatment/Session Assessment:   
· Response to Treatment:  Patients tolerated treatment well with no complications. Upon completion of treatment, skin condition was normal.. · Compliance with Program/Exercises: Will assess as treatment progresses. · Recommendations/Intent for next treatment session: \"Next visit will focus on advancements to more challenging activities\". Total Treatment Duration: OT Patient Time In/Time Out Time In: 36 Time Out: 1336 Blair Swenson OT

## 2019-02-13 ENCOUNTER — HOSPITAL ENCOUNTER (OUTPATIENT)
Dept: PHYSICAL THERAPY | Age: 44
Discharge: HOME | End: 2019-02-13
Payer: COMMERCIAL

## 2019-02-13 PROCEDURE — 97110 THERAPEUTIC EXERCISES: CPT

## 2019-02-13 PROCEDURE — 97140 MANUAL THERAPY 1/> REGIONS: CPT

## 2019-02-13 PROCEDURE — 97022 WHIRLPOOL THERAPY: CPT

## 2019-02-13 NOTE — PROGRESS NOTES
Theresa Gamboa : 1975 Primary: [employer] [facility]* Secondary:  [facility] [place] [id number] [facility], [address], [place] [address]. [phone]   [phone] OUTPATIENT OCCUPATIONAL THERAPY: Daily Note 2019 ICD-10: Treatment Diagnosis: Stiffness of left hand, not elsewhere classified (M25.642)Pain in left hand (O38.778) Precautions/Allergies:  
Patient has no known allergies. MD Orders: Evaluate and treat: custom molded TMA splint, ROM MEDICAL/REFERRING DIAGNOSIS:  
Unilateral primary osteoarthritis of first carpometacarpal joint, left hand [M18.12] DATE OF ONSET: several years ago DATE OF SURGERY: 2019 REFERRING PHYSICIAN: Leno Lackey MD 
RETURN PHYSICIAN APPOINTMENT: 4 weeks INITIAL ASSESSMENT:  Ms. Bijan Rg presents with decreased functional use, strength and range of motion of her left hand and upper extremity that is affecting her independence with activities of daily living and ability to perform job tasks. I feel that Ms. Bijan Rg will benefit from skilled occupational therapy to maximize the functional use of her hand and upper extremity in daily activities . PLAN OF CARE:  
PROBLEM LIST: 
1. Pain in left hand. 2. Decreased motion in left hand. 3. Decreased strength in left hand. INTERVENTIONS PLANNED: 
1. Modalities that may include fluidotherapy, paraffin, ultrasound, and light therapy. 2. Therapeutic exercise including a home exercise program. 
3. Manual therapy. 4. Therapeutic activities. TREATMENT PLAN: 
Effective Dates: 2019 TO 2019 (90 days). Frequency/Duration: 2 times a week for 90 DaysGOALS: (Goals have been discussed and agreed upon with patient.) Short-Term Functional Goals: Time Frame: 4 weeks 1. Decrease pain to 5 to allow patient to perform self care tasks.  
2. Increase motion in left hand by 20 degrees to improve functional use of upper extremity in ADL activities. 3. Increase strength in left hand by 10 pounds to allow patient to  and lift objects during self care activities. Discharge Goals: Time Frame: 12 weeks 1. Decrease pain to 2 to allow patient to perform all household  tasks. 2. Increase motion in left hand by 30 degreees to allow patient to perform all ADL activities. 3. Increase strength in left hand by 20 pounds to allow patient to , lift, hold, and carry heavy objects. Rehabilitation Potential For Stated Goals: Good Regarding Camilla Cuba's therapy, I certify that the treatment plan above will be carried out by a therapist or under their direction. Thank you for this referral, Jorge Cruz OT Referring Physician Signature: Randolm Phoenix., MD _________________________  Date _________ The information in this section was collected on 2019 (except where otherwise noted). OCCUPATIONAL PROFILE & HISTORY:  
History of Present Injury/Illness (Reason for Referral): The patient has had increasing arthritis pain in her left thumb for several years. Past Medical History/Comorbidities: Ms. Alley Van  has a past medical history of Allergic rhinitis, Anxiety, Arthritis, Chicken pox, and Pneumonia. Ms. Alley Van  has a past surgical history that includes radha biopsy breast stereotactic (); hx  section (); hx breast biopsy (Right); hx breast augmentation; and implant breast silicone/eq. Social History/Living Environment:  
  
Prior Level of Function/Work/Activity: 
independent Dominant Side:  
      RIGHT Ambulatory/Rehab Services H2 Model Falls Risk Assessment Risk Factors: 
     No Risk Factors Identified Ability to Rise from Chair: 
     (0)  Ability to rise in a single movement Falls Prevention Plan: No modifications necessary Total: (5 or greater = High Risk): 0  
  [employer] States Patent #[id number]. Federal Law prohibits the replication, distribution or use without written permission from [facility] Current Medications:   
Current Outpatient Medications:  
  HYDROmorphone (DILAUDID) 2 mg tablet, Take 1 Tab by mouth every three (3) hours as needed for Pain. Max Daily Amount: 16 mg., Disp: 30 Tab, Rfl: 0 
  CETIRIZINE HCL (ZYRTEC PO), Take 10 mg by mouth Daily (before breakfast). , Disp: , Rfl:   
Date Last Reviewed:  2/11/2019 Number of medical conditions (excluding presenting problem) that affect the Plan of Care: Brief history (0):  LOW COMPLEXITY ASSESSMENT OF OCCUPATIONAL PERFORMANCE:  
RANGE OF MOTION:    
· AROM: Measurements were not taken due to recent surgery. STRENGTH:  Not tested yet. SENSATION:  The patient reports tingling in her left thumb area. Physical Skills Involved: 1. Range of Motion 2. Strength 3. Sensation 4. Pain (acute) 5. Edema Cognitive Skills Affected (resulting in the inability to perform in a timely and safe manner): 1. none Psychosocial Skills Affected: 1. none Number of elements that affect the Plan of Care: 5+:  HIGH COMPLEXITY CLINICAL DECISION MAKING:  
Outcome Measure: Tool Used: Disabilities of the Arm, Shoulder and Hand (DASH) Questionnaire - Quick Version Score:  Initial: 47/55  Most Recent: X/55 (Date: -- ) Interpretation of Score: The DASH is designed to measure the activities of daily living in person's with upper extremity dysfunction or pain. Each section is scored on a 1-5 scale, 5 representing the greatest disability. The scores of each section are added together for a total score of 55. Score 11 12-19 20-28 29-37 38-45 46-54 55 Modifier CH CI CJ CK CL CM CN Medical Necessity: · The patient is expected to increase motion, strength and function in her left hand improving her ability to perform ADL, and household activities. Reason for Services/Other Comments: · The patient has limited motion, strength and function in her left hand. Jm Das Clinical Decision-Making Assessment:    
Clinical Decision-Making: LOW COMPLEXITY  
TREATMENT:  
(In addition to Assessment/Re-Assessment sessions the following treatments were rendered) Pre-treatment Symptoms/Complaints:  Pain and stiffness in left hand and wrist. 
Pain: Initial:  
Pain Intensity 1: 3 Pain Location 1: Hand, Wrist 
Pain Orientation 1: Left  Post Session:  3 The patient was provided with a fabricated protective splint for her left thumb and wrist. ( 15 minutes ) 2/11/2019 Patient stated \"I an anxious to move my fingers. \" 
 
Manual Therapy: (Soft Tissue Mobilization Duration Duration: 15 Minutes Duration: 15 Minutes): Technique: Retrograde massage Tissue Mobilized: Scar/adhesion LUE Soft Tissue Mobilization: Yes Technique: Retrograde massage Tissue Mobilized: Scar/adhesion Therapeutic Exercise:                                                                               : The patient's home exercise program was reviewed. Date: 
2/13/19 Date: Date: Date: Date: Activity/Exercise Parameters Parameters Parameters Parameters Parameters AROM during Fluidotherapy 20 min Paraffin with Stretch Retrograde massage, Friction Scar massage, Joint Mobilization 15 min Scarf Curl 5 min [employer] Individual Gripper Hand [place] Pegs Clothes Pins A-R [employer] [place]be [place] Velcro-Roll AROM EX 20 min RESISTIVE EXERCISES Weight/ Sets/Reps Weight/ Sets/Reps Weight/ Sets/Reps Weight/ Sets/Reps Weight/ Sets/Reps WEIGHT WELL Sup/Pro       
UD/RD Wrist Flex/Ext Free Weights UBE(Minutes) Nautilus Compound Row Vertical Chest       
[employer] HEP: As above; handouts given to patient for all exercises. Therapeutic Modalities:  
      Left Wrist Heat Type: Fluidotherapy(while perfoming AROM ex) Duration: 20 minutes Patient Position: Sitting Joint Mobilization:     
 
Treatment Times: · Therapeutic Exercise: 30 minutes · Manual Therapy: 15 minutes · Parafin:  minutes · Whirlpool: 15 minutes · Other:  minutes Treatment/Session Assessment:   
· Response to Treatment:  Patients tolerated treatment well with no complications. Upon completion of treatment, skin condition was normal.. · Compliance with Program/Exercises: Will assess as treatment progresses. · Recommendations/Intent for next treatment session: \"Next visit will focus on advancements to more challenging activities\". Will continue per MD. Total Treatment Duration: OT Patient Time In/Time Out Time In: 0800 Time Out: 0900 Gilles Howard OT

## 2019-02-19 ENCOUNTER — HOSPITAL ENCOUNTER (OUTPATIENT)
Dept: PHYSICAL THERAPY | Age: 44
Discharge: HOME | End: 2019-02-19
Payer: COMMERCIAL

## 2019-02-19 PROCEDURE — 97110 THERAPEUTIC EXERCISES: CPT

## 2019-02-19 PROCEDURE — 97140 MANUAL THERAPY 1/> REGIONS: CPT

## 2019-02-19 PROCEDURE — 97018 PARAFFIN BATH THERAPY: CPT

## 2019-02-19 NOTE — PROGRESS NOTES
Mary York : 1975 Primary: [employer]* Secondary:  [facility] [place] [address] [facility], [address], [place] [address]. [phone]   [phone] OUTPATIENT OCCUPATIONAL THERAPY: Daily Note 2019 ICD-10: Treatment Diagnosis: Stiffness of left hand, not elsewhere classified (M25.642)Pain in left hand (P74.046) Precautions/Allergies:  
Patient has no known allergies. MD Orders: Evaluate and treat: custom molded TMA splint, ROM MEDICAL/REFERRING DIAGNOSIS:  
Unilateral primary osteoarthritis of first carpometacarpal joint, left hand [M18.12] DATE OF ONSET: several years ago DATE OF SURGERY: 2019 REFERRING PHYSICIAN: Francisco Blake MD 
RETURN PHYSICIAN APPOINTMENT: 4 weeks INITIAL ASSESSMENT:  Ms. Sunita Salinas presents with decreased functional use, strength and range of motion of her left hand and upper extremity that is affecting her independence with activities of daily living and ability to perform job tasks. I feel that Ms. Sunita Salinas will benefit from skilled occupational therapy to maximize the functional use of her hand and upper extremity in daily activities . PLAN OF CARE:  
PROBLEM LIST: 
1. Pain in left hand. 2. Decreased motion in left hand. 3. Decreased strength in left hand. INTERVENTIONS PLANNED: 
1. Modalities that may include fluidotherapy, paraffin, ultrasound, and light therapy. 2. Therapeutic exercise including a home exercise program. 
3. Manual therapy. 4. Therapeutic activities. TREATMENT PLAN: 
Effective Dates: 2019 TO 2019 (90 days). Frequency/Duration: 2 times a week for 90 DaysGOALS: (Goals have been discussed and agreed upon with patient.) Short-Term Functional Goals: Time Frame: 4 weeks 1. Decrease pain to 5 to allow patient to perform self care tasks.  
2. Increase motion in left hand by 20 degrees to improve functional use of upper extremity in ADL activities. 3. Increase strength in left hand by 10 pounds to allow patient to  and lift objects during self care activities. Discharge Goals: Time Frame: 12 weeks 1. Decrease pain to 2 to allow patient to perform all household  tasks. 2. Increase motion in left hand by 30 degreees to allow patient to perform all ADL activities. 3. Increase strength in left hand by 20 pounds to allow patient to , lift, hold, and carry heavy objects. Rehabilitation Potential For Stated Goals: Good Regarding Leland Cuba's therapy, I certify that the treatment plan above will be carried out by a therapist or under their direction. Thank you for this referral, Alyssa Lucas, OT Referring Physician Signature: Daxa Caballero MD _________________________  Date _________ The information in this section was collected on 2019 (except where otherwise noted). OCCUPATIONAL PROFILE & HISTORY:  
History of Present Injury/Illness (Reason for Referral): The patient has had increasing arthritis pain in her left thumb for several years. Past Medical History/Comorbidities: Ms. Pedro Pablo Mcmahon  has a past medical history of Allergic rhinitis, Anxiety, Arthritis, Chicken pox, and Pneumonia. Ms. Pedro Pablo Mcmahon  has a past surgical history that includes radha biopsy breast stereotactic (); hx  section (); hx breast biopsy (Right); hx breast augmentation; implant breast silicone/eq; and hx orthopaedic (Left). Social History/Living Environment:  
  
Prior Level of Function/Work/Activity: 
independent Dominant Side:  
      RIGHT Ambulatory/Rehab Services H2 Model Falls Risk Assessment Risk Factors: 
     No Risk Factors Identified Ability to Rise from Chair: 
     (0)  Ability to rise in a single movement Falls Prevention Plan: No modifications necessary Total: (5 or greater = High Risk): 0 ©2010 [facility] [place] . [place] States Patent #[id number]. Federal Law prohibits the replication, distribution or use without written permission from [facility] [place] Current Medications:   
Current Outpatient Medications:  
  HYDROmorphone (DILAUDID) 2 mg tablet, Take 1 Tab by mouth every three (3) hours as needed for Pain. Max Daily Amount: 16 mg., Disp: 30 Tab, Rfl: 0 
  CETIRIZINE HCL (ZYRTEC PO), Take 10 mg by mouth Daily (before breakfast). , Disp: , Rfl:   
Date Last Reviewed:  2/19/2019 Number of medical conditions (excluding presenting problem) that affect the Plan of Care: Brief history (0):  LOW COMPLEXITY ASSESSMENT OF OCCUPATIONAL PERFORMANCE:  
RANGE OF MOTION:    
· AROM: Measurements were not taken due to recent surgery. STRENGTH:  Not tested yet. SENSATION:  The patient reports tingling in her left thumb area. Physical Skills Involved: 1. Range of Motion 2. Strength 3. Sensation 4. Pain (acute) 5. Edema Cognitive Skills Affected (resulting in the inability to perform in a timely and safe manner): 1. none Psychosocial Skills Affected: 1. none Number of elements that affect the Plan of Care: 5+:  HIGH COMPLEXITY CLINICAL DECISION MAKING:  
Outcome Measure: Tool Used: Disabilities of the Arm, Shoulder and Hand (DASH) Questionnaire - Quick Version Score:  Initial: 47/55  Most Recent: X/55 (Date: -- ) Interpretation of Score: The DASH is designed to measure the activities of daily living in person's with upper extremity dysfunction or pain. Each section is scored on a 1-5 scale, 5 representing the greatest disability. The scores of each section are added together for a total score of 55. Score 11 12-19 20-28 29-37 38-45 46-54 55 Modifier CH CI CJ CK CL CM CN Medical Necessity: · The patient is expected to increase motion, strength and function in her left hand improving her ability to perform ADL, and household activities. Reason for Services/Other Comments: · The patient has limited motion, strength and function in her left hand. Khushi Jacobo Clinical Decision-Making Assessment:    
Clinical Decision-Making: LOW COMPLEXITY  
TREATMENT:  
(In addition to Assessment/Re-Assessment sessions the following treatments were rendered) Pre-treatment Symptoms/Complaints:  Pain and stiffness in left hand and wrist. 
Pain: Initial:  
Pain Intensity 1: 5 Pain Location 1: Hand, Wrist 
Pain Orientation 1: Left  Post Session:  3 The patient was provided with a fabricated protective splint for her left thumb and wrist. ( 15 minutes ) 2/11/2019 Patient stated \"I think I over did it this weekend, I am having more pain. Khushi Jacobo \" 
 
Manual Therapy: (Soft Tissue Mobilization Duration Duration: 15 Minutes Duration: 15 Minutes): Technique: Connective tissue, Retrograde massage Tissue Mobilized: Scar/adhesion LUE Soft Tissue Mobilization: Yes Technique: Retrograde massage, Connective tissue Tissue Mobilized: Scar/adhesion Therapeutic Exercise:                                                                               : The patient's home exercise program was reviewed. Date: 
2/13/19 Date: 
2/19/19 Date: Date: Date: Activity/Exercise Parameters Parameters Parameters Parameters Parameters AROM during Fluidotherapy 20 min 20 min Paraffin with Stretch 15 min Retrograde massage, Friction Scar massage, Joint Mobilization 15 min 15 min Scarf Curl 5 min 5 min [employer]  5 min Individual Gripper Hand [place] Pegs Clothes Pins A-R [employer] Velcro-Roll AROM EX 20 min 20 min RESISTIVE EXERCISES Weight/ Sets/Reps Weight/ Sets/Reps Weight/ Sets/Reps Weight/ Sets/Reps Weight/ Sets/Reps WEIGHT WELL Sup/Pro       
 UD/RD Wrist Flex/Ext Free Weights UBE(Minutes) Nautilus Compound Row Vertical [facility] [employer] HEP: As above; handouts given to patient for all exercises. Therapeutic Modalities:  
      Left Wrist Heat Type: Paraffin bath Duration: 15 minutes Patient Position: Sitting Joint Mobilization:     
 
Treatment Times: · Therapeutic Exercise: 30 minutes · Manual Therapy: 15 minutes · Parafin:15  minutes · Whirlpool:  minutes · Other:  minutes Treatment/Session Assessment:   
· Response to Treatment:  Patients tolerated treatment well with no complications. Upon completion of treatment, skin condition was normal.. · Compliance with Program/Exercises: Will assess as treatment progresses. · Recommendations/Intent for next treatment session: \"Next visit will focus on advancements to more challenging activities\". Will continue per MD. Total Treatment Duration: OT Patient Time In/Time Out Time In: 0800 Time Out: 0900 Refugio Dumont OT

## 2019-02-21 ENCOUNTER — HOSPITAL ENCOUNTER (OUTPATIENT)
Dept: PHYSICAL THERAPY | Age: 44
Discharge: HOME | End: 2019-02-21
Payer: COMMERCIAL

## 2019-02-21 PROCEDURE — 97018 PARAFFIN BATH THERAPY: CPT

## 2019-02-21 PROCEDURE — 97140 MANUAL THERAPY 1/> REGIONS: CPT

## 2019-02-21 PROCEDURE — 97110 THERAPEUTIC EXERCISES: CPT

## 2019-02-21 NOTE — PROGRESS NOTES
Chica Doherty : 1975 Primary: [employer] [facility]* Secondary:  [facility] [place] [address], [address], [address] [facility] [phone]   [phone] OUTPATIENT OCCUPATIONAL THERAPY: Daily Note 2019 ICD-10: Treatment Diagnosis: Stiffness of left hand, not elsewhere classified (M25.642)Pain in left hand (B75.668) Precautions/Allergies:  
Patient has no known allergies. MD Orders: Evaluate and treat: custom molded TMA splint, ROM MEDICAL/REFERRING DIAGNOSIS:  
Unilateral primary osteoarthritis of first carpometacarpal joint, left hand [M18.12] DATE OF ONSET: several years ago DATE OF SURGERY: 2019 REFERRING PHYSICIAN: Lorraine Chapman MD 
RETURN PHYSICIAN APPOINTMENT: 4 weeks INITIAL ASSESSMENT:  Ms. Gume Schmitt presents with decreased functional use, strength and range of motion of her left hand and upper extremity that is affecting her independence with activities of daily living and ability to perform job tasks. I feel that Ms. Gume Schmitt will benefit from skilled occupational therapy to maximize the functional use of her hand and upper extremity in daily activities . PLAN OF CARE:  
PROBLEM LIST: 
1. Pain in left hand. 2. Decreased motion in left hand. 3. Decreased strength in left hand. INTERVENTIONS PLANNED: 
1. Modalities that may include fluidotherapy, paraffin, ultrasound, and light therapy. 2. Therapeutic exercise including a home exercise program. 
3. Manual therapy. 4. Therapeutic activities. TREATMENT PLAN: 
Effective Dates: 2019 TO 2019 (90 days). Frequency/Duration: 2 times a week for 90 DaysGOALS: (Goals have been discussed and agreed upon with patient.) Short-Term Functional Goals: Time Frame: 4 weeks 1. Decrease pain to 5 to allow patient to perform self care tasks.  
2. Increase motion in left hand by 20 degrees to improve functional use of upper extremity in ADL activities. 3. Increase strength in left hand by 10 pounds to allow patient to  and lift objects during self care activities. Discharge Goals: Time Frame: 12 weeks 1. Decrease pain to 2 to allow patient to perform all household  tasks. 2. Increase motion in left hand by 30 degreees to allow patient to perform all ADL activities. 3. Increase strength in left hand by 20 pounds to allow patient to , lift, hold, and carry heavy objects. Rehabilitation Potential For Stated Goals: Good Regarding Casimiro Quekayla Cuba's therapy, I certify that the treatment plan above will be carried out by a therapist or under their direction. Thank you for this referral, Ade Pino, OT Referring Physician Signature: Laith Haynes MD _________________________  Date _________ The information in this section was collected on 2019 (except where otherwise noted). OCCUPATIONAL PROFILE & HISTORY:  
History of Present Injury/Illness (Reason for Referral): The patient has had increasing arthritis pain in her left thumb for several years. Past Medical History/Comorbidities: Ms. Peggy Pineda  has a past medical history of Allergic rhinitis, Anxiety, Arthritis, Chicken pox, and Pneumonia. Ms. Peggy Pineda  has a past surgical history that includes radha biopsy breast stereotactic (); hx  section (); hx breast biopsy (Right); hx breast augmentation; implant breast silicone/eq; and hx orthopaedic (Left). Social History/Living Environment:  
  
Prior Level of Function/Work/Activity: 
independent Dominant Side:  
      RIGHT Ambulatory/Rehab Services H2 Model Falls Risk Assessment Risk Factors: 
     No Risk Factors Identified Ability to Rise from Chair: 
     (0)  Ability to rise in a single movement Falls Prevention Plan: No modifications necessary Total: (5 or greater = High Risk): 0 ©2010 [facility] [place] . [facility] States Patent #[id number]. Federal Law prohibits the replication, distribution or use without written permission from [facility] [employer] Current Medications:   
Current Outpatient Medications:  
  HYDROmorphone (DILAUDID) 2 mg tablet, Take 1 Tab by mouth every three (3) hours as needed for Pain. Max Daily Amount: 16 mg., Disp: 30 Tab, Rfl: 0 
  CETIRIZINE HCL (ZYRTEC PO), Take 10 mg by mouth Daily (before breakfast). , Disp: , Rfl:   
Date Last Reviewed:  2/19/2019 Number of medical conditions (excluding presenting problem) that affect the Plan of Care: Brief history (0):  LOW COMPLEXITY ASSESSMENT OF OCCUPATIONAL PERFORMANCE:  
RANGE OF MOTION:    
· AROM: Measurements were not taken due to recent surgery. STRENGTH:  Not tested yet. SENSATION:  The patient reports tingling in her left thumb area. Physical Skills Involved: 1. Range of Motion 2. Strength 3. Sensation 4. Pain (acute) 5. Edema Cognitive Skills Affected (resulting in the inability to perform in a timely and safe manner): 1. none Psychosocial Skills Affected: 1. none Number of elements that affect the Plan of Care: 5+:  HIGH COMPLEXITY CLINICAL DECISION MAKING:  
Outcome Measure: Tool Used: Disabilities of the Arm, Shoulder and Hand (DASH) Questionnaire - Quick Version Score:  Initial: 47/55  Most Recent: X/55 (Date: -- ) Interpretation of Score: The DASH is designed to measure the activities of daily living in person's with upper extremity dysfunction or pain. Each section is scored on a 1-5 scale, 5 representing the greatest disability. The scores of each section are added together for a total score of 55. Score 11 12-19 20-28 29-37 38-45 46-54 55 Modifier CH CI CJ CK CL CM CN Medical Necessity: · The patient is expected to increase motion, strength and function in her left hand improving her ability to perform ADL, and household activities. Reason for Services/Other Comments: · The patient has limited motion, strength and function in her left hand. Lexis Summers Clinical Decision-Making Assessment:    
Clinical Decision-Making: LOW COMPLEXITY  
TREATMENT:  
(In addition to Assessment/Re-Assessment sessions the following treatments were rendered) Pre-treatment Symptoms/Complaints:  Pain and stiffness in left hand and wrist. 
Pain: Initial:  
Pain Intensity 1: 5 Pain Location 1: Hand, Wrist 
Pain Orientation 1: Left  Post Session:  3 The patient was provided with a fabricated protective splint for her left thumb and wrist. ( 15 minutes ) 2/11/2019 Patient stated \"I still don't have much motion. \" 
 
Manual Therapy: (Soft Tissue Mobilization Duration Duration: 15 Minutes Duration: 15 Minutes): Technique: Retrograde massage, Connective tissue(followed by Light tx digits 2-5 & PROM) Tissue Mobilized: Scar/adhesion LUE Soft Tissue Mobilization: Yes Technique: Retrograde massage, Connective tissue Tissue Mobilized: Scar/adhesion Therapeutic Exercise:                                                                               : The patient's home exercise program was reviewed. Date: 
2/13/19 Date: 
2/19/19 Date: 
2/21/19 Date: Date: Activity/Exercise Parameters Parameters Parameters Parameters Parameters AROM during Fluidotherapy 20 min 20 min 20 min Paraffin with Stretch 15 min 15 min Retrograde massage, Friction Scar massage, Joint Mobilization 15 min 15 min 15 min Light tx Scarf Curl 5 min 5 min 5 min Washer Game  5 min 5 min Individual Gripper Hand [place] Blocking boards 5 min Pegs Clothes Pins A-R [employer] Radha Po Velcro-Roll AROM EX 20 min 20 min 15 min RESISTIVE EXERCISES Weight/ Sets/Reps Weight/ Sets/Reps Weight/ Sets/Reps Weight/ Sets/Reps Weight/ Sets/Reps WEIGHT WELL Sup/Pro       
UD/RD Wrist Flex/Ext Free Weights UBE(Minutes) Nautilus Compound Row Vertical [facility] [employer] HEP: As above; handouts given to patient for all exercises. Therapeutic Modalities:  
      Left Wrist Heat Type: Paraffin bath Duration: 15 minutes Patient Position: Sitting Joint Mobilization:     
 
Treatment Times: · Therapeutic Exercise: 30 minutes · Manual Therapy: 15 minutes · Parafin:15  minutes · Whirlpool:  minutes · Other:  minutes Treatment/Session Assessment:   
· Response to Treatment:  Patients tolerated treatment well with no complications. Upon completion of treatment, skin condition was normal.. · Compliance with Program/Exercises: Will assess as treatment progresses. · Recommendations/Intent for next treatment session: \"Next visit will focus on advancements to more challenging activities\". Will continue per MD. Total Treatment Duration: OT Patient Time In/Time Out Time In: 0800 Time Out: 0900 Hannah Jones OT

## 2019-02-25 ENCOUNTER — HOSPITAL ENCOUNTER (OUTPATIENT)
Dept: PHYSICAL THERAPY | Age: 44
Discharge: HOME | End: 2019-02-25
Payer: COMMERCIAL

## 2019-02-25 PROCEDURE — 97018 PARAFFIN BATH THERAPY: CPT

## 2019-02-25 PROCEDURE — 97140 MANUAL THERAPY 1/> REGIONS: CPT

## 2019-02-25 PROCEDURE — 97110 THERAPEUTIC EXERCISES: CPT

## 2019-02-25 NOTE — PROGRESS NOTES
Daniel Bal : 1975 Primary: [facility] [employer] [facility]* Secondary:  [facility] [place] [address], [address], [address] [facility] [phone]   [phone] OUTPATIENT OCCUPATIONAL THERAPY: Daily Note 2019 ICD-10: Treatment Diagnosis: Stiffness of left hand, not elsewhere classified (M25.642)Pain in left hand (E30.603) Precautions/Allergies:  
Patient has no known allergies. MD Orders: Evaluate and treat: custom molded TMA splint, ROM MEDICAL/REFERRING DIAGNOSIS:  
Unilateral primary osteoarthritis of first carpometacarpal joint, left hand [M18.12] DATE OF ONSET: several years ago DATE OF SURGERY: 2019 REFERRING PHYSICIAN: Butch Concepcion MD 
RETURN PHYSICIAN APPOINTMENT: 4 weeks INITIAL ASSESSMENT:  Ms. Karolina Hopper presents with decreased functional use, strength and range of motion of her left hand and upper extremity that is affecting her independence with activities of daily living and ability to perform job tasks. I feel that Ms. Karolina Hopper will benefit from skilled occupational therapy to maximize the functional use of her hand and upper extremity in daily activities . PLAN OF CARE:  
PROBLEM LIST: 
1. Pain in left hand. 2. Decreased motion in left hand. 3. Decreased strength in left hand. INTERVENTIONS PLANNED: 
1. Modalities that may include fluidotherapy, paraffin, ultrasound, and light therapy. 2. Therapeutic exercise including a home exercise program. 
3. Manual therapy. 4. Therapeutic activities. TREATMENT PLAN: 
Effective Dates: 2019 TO 2019 (90 days). Frequency/Duration: 2 times a week for 90 DaysGOALS: (Goals have been discussed and agreed upon with patient.) Short-Term Functional Goals: Time Frame: 4 weeks 1. Decrease pain to 5 to allow patient to perform self care tasks.  
2. Increase motion in left hand by 20 degrees to improve functional use of upper extremity in ADL activities. 3. Increase strength in left hand by 10 pounds to allow patient to  and lift objects during self care activities. Discharge Goals: Time Frame: 12 weeks 1. Decrease pain to 2 to allow patient to perform all household  tasks. 2. Increase motion in left hand by 30 degreees to allow patient to perform all ADL activities. 3. Increase strength in left hand by 20 pounds to allow patient to , lift, hold, and carry heavy objects. Rehabilitation Potential For Stated Goals: Good Regarding Camilla Cuba's therapy, I certify that the treatment plan above will be carried out by a therapist or under their direction. Thank you for this referral, Jorge Cruz OT Referring Physician Signature: Juice Saavedra MD _________________________  Date _________ The information in this section was collected on 2019 (except where otherwise noted). OCCUPATIONAL PROFILE & HISTORY:  
History of Present Injury/Illness (Reason for Referral): The patient has had increasing arthritis pain in her left thumb for several years. Past Medical History/Comorbidities: Ms. Alley Van  has a past medical history of Allergic rhinitis, Anxiety, Arthritis, Chicken pox, and Pneumonia. Ms. Alley Van  has a past surgical history that includes radha biopsy breast stereotactic (); hx  section (); hx breast biopsy (Right); hx breast augmentation; implant breast silicone/eq; and hx orthopaedic (Left). Social History/Living Environment:  
  
Prior Level of Function/Work/Activity: 
independent Dominant Side:  
      RIGHT Ambulatory/Rehab Services H2 Model Falls Risk Assessment Risk Factors: 
     No Risk Factors Identified Ability to Rise from Chair: 
     (0)  Ability to rise in a single movement Falls Prevention Plan: No modifications necessary Total: (5 or greater = High Risk): 0 ©2010 [facility] [place] . [facility] States Patent #[id number]. Federal Law prohibits the replication, distribution or use without written permission from [facility] [employer] Current Medications:   
Current Outpatient Medications:  
  HYDROmorphone (DILAUDID) 2 mg tablet, Take 1 Tab by mouth every three (3) hours as needed for Pain. Max Daily Amount: 16 mg., Disp: 30 Tab, Rfl: 0 
  CETIRIZINE HCL (ZYRTEC PO), Take 10 mg by mouth Daily (before breakfast). , Disp: , Rfl:   
Date Last Reviewed:  2/25/2019 Number of medical conditions (excluding presenting problem) that affect the Plan of Care: Brief history (0):  LOW COMPLEXITY ASSESSMENT OF OCCUPATIONAL PERFORMANCE:  
RANGE OF MOTION:    
· AROM: Measurements were not taken due to recent surgery. STRENGTH:  Not tested yet. SENSATION:  The patient reports tingling in her left thumb area. Physical Skills Involved: 1. Range of Motion 2. Strength 3. Sensation 4. Pain (acute) 5. Edema Cognitive Skills Affected (resulting in the inability to perform in a timely and safe manner): 1. none Psychosocial Skills Affected: 1. none Number of elements that affect the Plan of Care: 5+:  HIGH COMPLEXITY CLINICAL DECISION MAKING:  
Outcome Measure: Tool Used: Disabilities of the Arm, Shoulder and Hand (DASH) Questionnaire - Quick Version Score:  Initial: 47/55  Most Recent: X/55 (Date: -- ) Interpretation of Score: The DASH is designed to measure the activities of daily living in person's with upper extremity dysfunction or pain. Each section is scored on a 1-5 scale, 5 representing the greatest disability. The scores of each section are added together for a total score of 55. Score 11 12-19 20-28 29-37 38-45 46-54 55 Modifier CH CI CJ CK CL CM CN Medical Necessity: · The patient is expected to increase motion, strength and function in her left hand improving her ability to perform ADL, and household activities. Reason for Services/Other Comments: · The patient has limited motion, strength and function in her left hand. Hans Pham Clinical Decision-Making Assessment:    
Clinical Decision-Making: LOW COMPLEXITY  
TREATMENT:  
(In addition to Assessment/Re-Assessment sessions the following treatments were rendered) Pre-treatment Symptoms/Complaints:  Pain and stiffness in left hand and wrist. 
Pain: Initial:  
Pain Intensity 1: 3 Pain Location 1: Hand, Wrist 
Pain Orientation 1: Left  Post Session:  3 The patient was provided with a fabricated protective splint for her left thumb and wrist. ( 15 minutes ) 2/11/2019 Patient stated \"I am moving a little more. \" 
 
Manual Therapy: (Soft Tissue Mobilization Duration Duration: 15 Minutes Duration: 15 Minutes): Technique: Retrograde massage Tissue Mobilized: Scar/adhesion LUE Soft Tissue Mobilization: Yes Technique: Retrograde massage Tissue Mobilized: Scar/adhesion Therapeutic Exercise:                                                                               : The patient's home exercise program was reviewed. Date: 
2/13/19 Date: 
2/19/19 Date: 
2/21/19 Date: 
2/25/19 Date: Activity/Exercise Parameters Parameters Parameters Parameters Parameters AROM during Fluidotherapy 20 min 20 min 20 min 20 min Paraffin with Stretch 15 min 15 min 15 min Retrograde massage, Friction Scar massage, Joint Mobilization 15 min 15 min 15 min Light tx 15 min Scarf Curl 5 min 5 min 5 min 5 min Washer Game  5 min 5 min 5 min Individual Gripper Hand [place] Blocking boards 5 min 5 min Pegs Clothes Pins A-R [employer] Patrick Camacho Velcro-Roll AROM EX 20 min 20 min 15 min 15 min RESISTIVE EXERCISES Weight/ Sets/Reps Weight/ Sets/Reps Weight/ Sets/Reps Weight/ Sets/Reps Weight/ Sets/Reps WEIGHT WELL Sup/Pro       
UD/RD Wrist Flex/Ext Free Weights UBE(Minutes) Nautilus Compound Row Vertical [facility] [employer] HEP: As above; handouts given to patient for all exercises. Therapeutic Modalities:  
      Left Wrist Heat Type: Paraffin bath Duration: 15 minutes Patient Position: Sitting Joint Mobilization:     
 
Treatment Times: · Therapeutic Exercise: 30 minutes · Manual Therapy: 15 minutes · Parafin:15  minutes · Whirlpool:  minutes · Other:  minutes Treatment/Session Assessment:   
· Response to Treatment:  Patients tolerated treatment well with no complications. Upon completion of treatment, skin condition was normal.. · Compliance with Program/Exercises: Will assess as treatment progresses. · Recommendations/Intent for next treatment session: \"Next visit will focus on advancements to more challenging activities\". Will continue per MD. Total Treatment Duration: OT Patient Time In/Time Out Time In: 0800 Time Out: 0900 Nakia Tan OT

## 2019-02-27 ENCOUNTER — HOSPITAL ENCOUNTER (OUTPATIENT)
Dept: PHYSICAL THERAPY | Age: 44
Discharge: HOME | End: 2019-02-27
Payer: COMMERCIAL

## 2019-02-27 PROCEDURE — 97110 THERAPEUTIC EXERCISES: CPT

## 2019-02-27 PROCEDURE — 97140 MANUAL THERAPY 1/> REGIONS: CPT

## 2019-02-27 PROCEDURE — 97018 PARAFFIN BATH THERAPY: CPT

## 2019-02-27 NOTE — PROGRESS NOTES
Va Jennings : 1975 Primary: [facility]* Secondary:  [facility] [place] [id number] [facility], [address], [place] [address]. [phone]   [phone] OUTPATIENT OCCUPATIONAL THERAPY: Daily Note 2019 ICD-10: Treatment Diagnosis: Stiffness of left hand, not elsewhere classified (M25.642)Pain in left hand (N67.776) Precautions/Allergies:  
Patient has no known allergies. MD Orders: Evaluate and treat: custom molded TMA splint, ROM MEDICAL/REFERRING DIAGNOSIS:  
Unilateral primary osteoarthritis of first carpometacarpal joint, left hand [M18.12] DATE OF ONSET: several years ago DATE OF SURGERY: 2019 REFERRING PHYSICIAN: Yvonne Sharma MD 
RETURN PHYSICIAN APPOINTMENT: 4 weeks INITIAL ASSESSMENT:  Ms. Nikia Tomas presents with decreased functional use, strength and range of motion of her left hand and upper extremity that is affecting her independence with activities of daily living and ability to perform job tasks. I feel that Ms. Nikia Tomas will benefit from skilled occupational therapy to maximize the functional use of her hand and upper extremity in daily activities . PLAN OF CARE:  
PROBLEM LIST: 
1. Pain in left hand. 2. Decreased motion in left hand. 3. Decreased strength in left hand. INTERVENTIONS PLANNED: 
1. Modalities that may include fluidotherapy, paraffin, ultrasound, and light therapy. 2. Therapeutic exercise including a home exercise program. 
3. Manual therapy. 4. Therapeutic activities. TREATMENT PLAN: 
Effective Dates: 2019 TO 2019 (90 days). Frequency/Duration: 2 times a week for 90 DaysGOALS: (Goals have been discussed and agreed upon with patient.) Short-Term Functional Goals: Time Frame: 4 weeks 1. Decrease pain to 5 to allow patient to perform self care tasks.  
2. Increase motion in left hand by 20 degrees to improve functional use of upper extremity in ADL activities. 3. Increase strength in left hand by 10 pounds to allow patient to  and lift objects during self care activities. Discharge Goals: Time Frame: 12 weeks 1. Decrease pain to 2 to allow patient to perform all household  tasks. 2. Increase motion in left hand by 30 degreees to allow patient to perform all ADL activities. 3. Increase strength in left hand by 20 pounds to allow patient to , lift, hold, and carry heavy objects. Rehabilitation Potential For Stated Goals: Good Regarding Jayy Allisonmarysol Cuba's therapy, I certify that the treatment plan above will be carried out by a therapist or under their direction. Thank you for this referral, Blair Swenson, OT Referring Physician Signature: Lili Quinones MD _________________________  Date _________ The information in this section was collected on 2019 (except where otherwise noted). OCCUPATIONAL PROFILE & HISTORY:  
History of Present Injury/Illness (Reason for Referral): The patient has had increasing arthritis pain in her left thumb for several years. Past Medical History/Comorbidities: Ms. Sherine Giordano  has a past medical history of Allergic rhinitis, Anxiety, Arthritis, Chicken pox, and Pneumonia. Ms. Sherine Giordano  has a past surgical history that includes radha biopsy breast stereotactic (); hx  section (); hx breast biopsy (Right); hx breast augmentation; implant breast silicone/eq; and hx orthopaedic (Left). Social History/Living Environment:  
  
Prior Level of Function/Work/Activity: 
independent Dominant Side:  
      RIGHT Ambulatory/Rehab Services H2 Model Falls Risk Assessment Risk Factors: 
     No Risk Factors Identified Ability to Rise from Chair: 
     (0)  Ability to rise in a single movement Falls Prevention Plan: No modifications necessary Total: (5 or greater = High Risk): 0 ©2010 [facility] . [facility] Patent #[id number]. Federal Law prohibits the replication, distribution or use without written permission from [facility] [employer] Current Medications:   
Current Outpatient Medications:  
  HYDROmorphone (DILAUDID) 2 mg tablet, Take 1 Tab by mouth every three (3) hours as needed for Pain. Max Daily Amount: 16 mg., Disp: 30 Tab, Rfl: 0 
  CETIRIZINE HCL (ZYRTEC PO), Take 10 mg by mouth Daily (before breakfast). , Disp: , Rfl:   
Date Last Reviewed:  2/25/2019 Number of medical conditions (excluding presenting problem) that affect the Plan of Care: Brief history (0):  LOW COMPLEXITY ASSESSMENT OF OCCUPATIONAL PERFORMANCE:  
RANGE OF MOTION:    
· AROM: Measurements were not taken due to recent surgery. STRENGTH:  Not tested yet. SENSATION:  The patient reports tingling in her left thumb area. Physical Skills Involved: 1. Range of Motion 2. Strength 3. Sensation 4. Pain (acute) 5. Edema Cognitive Skills Affected (resulting in the inability to perform in a timely and safe manner): 1. none Psychosocial Skills Affected: 1. none Number of elements that affect the Plan of Care: 5+:  HIGH COMPLEXITY CLINICAL DECISION MAKING:  
Outcome Measure: Tool Used: Disabilities of the Arm, Shoulder and Hand (DASH) Questionnaire - Quick Version Score:  Initial: 47/55  Most Recent: X/55 (Date: -- ) Interpretation of Score: The DASH is designed to measure the activities of daily living in person's with upper extremity dysfunction or pain. Each section is scored on a 1-5 scale, 5 representing the greatest disability. The scores of each section are added together for a total score of 55. Score 11 12-19 20-28 29-37 38-45 46-54 55 Modifier CH CI CJ CK CL CM CN Medical Necessity: · The patient is expected to increase motion, strength and function in her left hand improving her ability to perform ADL, and household activities. Reason for Services/Other Comments: · The patient has limited motion, strength and function in her left hand. Ivonne Bergman Clinical Decision-Making Assessment:    
Clinical Decision-Making: LOW COMPLEXITY  
TREATMENT:  
(In addition to Assessment/Re-Assessment sessions the following treatments were rendered) Pre-treatment Symptoms/Complaints:  Pain and stiffness in left hand and wrist. 
Pain: Initial:  
Pain Intensity 1: 2 Pain Location 1: Hand, Wrist 
Pain Orientation 1: Left  Post Session:  2 The patient was provided with a fabricated protective splint for her left thumb and wrist. ( 15 minutes ) 2/11/2019 Patient stated \"I am able to do my exercises 4 times a day now. Ivonne Bergman \" 
 
Manual Therapy: (Soft Tissue Mobilization Duration Duration: 15 Minutes Duration: 15 Minutes): Technique: Retrograde massage Tissue Mobilized: Scar/adhesion LUE Soft Tissue Mobilization: Yes Technique: Retrograde massage Tissue Mobilized: Scar/adhesion Therapeutic Exercise:                                                                               : The patient's home exercise program was reviewed. Date: 
2/13/19 Date: 
2/19/19 Date: 
2/21/19 Date: 
2/25/19 Date: 
2/27/19 Activity/Exercise Parameters Parameters Parameters Parameters Parameters AROM during Fluidotherapy 20 min 20 min 20 min 20 min 20 min Paraffin with Stretch 15 min 15 min 15 min 15 min Retrograde massage, Friction Scar massage, Joint Mobilization 15 min 15 min 15 min Light tx 15 min 15 min Scarf Curl 5 min 5 min 5 min 5 min 5 min Washer Game  5 min 5 min 5 min 5 min Individual Gripper Hand [place] Blocking boards 5 min 5 min 5 min Pegs Clothes Pins A-R [employer] [place] Crutch Velcro-Roll AROM EX 20 min 20 min 15 min 15 min 10 min RESISTIVE EXERCISES Weight/ Sets/Reps Weight/ Sets/Reps Weight/ Sets/Reps Weight/ Sets/Reps Weight/ Sets/Reps WEIGHT WELL Sup/Pro       
UD/RD Wrist Flex/Ext Free Weights UBE(Minutes) Nautilus Compound Row Vertical [facility] HEP: As above; handouts given to patient for all exercises. Therapeutic Modalities:  
      Left Wrist Heat Type: Paraffin bath(with a finger flexion stretch) Duration: 15 minutes Patient Position: Sitting Joint Mobilization:     
 
Treatment Times: · Therapeutic Exercise: 30 minutes · Manual Therapy: 15 minutes · Parafin:15  minutes · Whirlpool:  minutes · Other:  minutes Treatment/Session Assessment:   
· Response to Treatment:  Patients tolerated treatment well with no complications. Upon completion of treatment, skin condition was normal.. · Compliance with Program/Exercises: Will assess as treatment progresses. · Recommendations/Intent for next treatment session: \"Next visit will focus on advancements to more challenging activities\". Will continue per MD. Total Treatment Duration: OT Patient Time In/Time Out Time In: 0800 Time Out: 0900 Allayne Apley, OT

## 2019-03-04 ENCOUNTER — HOSPITAL ENCOUNTER (OUTPATIENT)
Dept: PHYSICAL THERAPY | Age: 44
Discharge: HOME | End: 2019-03-04
Payer: COMMERCIAL

## 2019-03-04 PROCEDURE — 97018 PARAFFIN BATH THERAPY: CPT

## 2019-03-04 PROCEDURE — 97140 MANUAL THERAPY 1/> REGIONS: CPT

## 2019-03-04 PROCEDURE — 97110 THERAPEUTIC EXERCISES: CPT

## 2019-03-04 NOTE — PROGRESS NOTES
Claudene Kerr  : 1975  Primary: [facility]*  Secondary:  [facility] [place] [address], [place] [address].  [phone]   [phone]          OUTPATIENT OCCUPATIONAL THERAPY: Daily Note 3/4/2019     ICD-10: Treatment Diagnosis: Stiffness of left hand, not elsewhere classified (M25.642)Pain in left hand (D77.537)  Precautions/Allergies:   Patient has no known allergies. MD Orders: Evaluate and treat: custom molded TMA splint, ROM MEDICAL/REFERRING DIAGNOSIS:   Unilateral primary osteoarthritis of first carpometacarpal joint, left hand [M18.12]   DATE OF ONSET: several years ago  DATE OF SURGERY: 2019   REFERRING PHYSICIAN: Sixto Fish MD  RETURN PHYSICIAN APPOINTMENT: 4 weeks     INITIAL ASSESSMENT:  Ms. Campbell Earl presents with decreased functional use, strength and range of motion of her left hand and upper extremity that is affecting her independence with activities of daily living and ability to perform job tasks. I feel that Ms. Campbell Earl will benefit from skilled occupational therapy to maximize the functional use of her hand and upper extremity in daily activities . PLAN OF CARE:   PROBLEM LIST:  1. Pain in left hand. 2. Decreased motion in left hand. 3. Decreased strength in left hand. INTERVENTIONS PLANNED:  1. Modalities that may include fluidotherapy, paraffin, ultrasound, and light therapy. 2. Therapeutic exercise including a home exercise program.  3. Manual therapy. 4. Therapeutic activities. TREATMENT PLAN:  Effective Dates: 2019 TO 2019 (90 days). Frequency/Duration: 2 times a week for 90 Days  GOALS: (Goals have been discussed and agreed upon with patient.)  Short-Term Functional Goals: Time Frame: 4 weeks  1. Decrease pain to 5 to allow patient to perform self care tasks.   2. Increase motion in left hand by 20 degrees to improve functional use of upper extremity in ADL activities. 3. Increase strength in left hand by 10 pounds to allow patient to  and lift objects during self care activities. Discharge Goals: Time Frame: 12 weeks  1. Decrease pain to 2 to allow patient to perform all household  tasks. 2. Increase motion in left hand by 30 degreees to allow patient to perform all ADL activities. 3. Increase strength in left hand by 20 pounds to allow patient to , lift, hold, and carry heavy objects. Rehabilitation Potential For Stated Goals: Good  Regarding [place] therapy, I certify that the treatment plan above will be carried out by a therapist or under their direction. Thank you for this referral,  Selvin Gupta OT       Referring Physician Signature: Yvonne Sharma MD _________________________  Date _________            The information in this section was collected on 2019 (except where otherwise noted). OCCUPATIONAL PROFILE & HISTORY:   History of Present Injury/Illness (Reason for Referral): The patient has had increasing arthritis pain in her left thumb for several years. Past Medical History/Comorbidities:   Ms. Nikia Tomas  has a past medical history of Allergic rhinitis, Anxiety, Arthritis, Chicken pox, and Pneumonia. Ms. Nikia Tomas  has a past surgical history that includes radha biopsy breast stereotactic (); hx  section (); hx breast biopsy (Right); hx breast augmentation; implant breast silicone/eq; and hx orthopaedic (Left). Social History/Living Environment:      Prior Level of Function/Work/Activity:  independent  Dominant Side:         RIGHT     Ambulatory/Rehab Services H2 Model Falls Risk Assessment    Risk Factors:       No Risk Factors Identified Ability to Rise from Chair:       (0)  Ability to rise in a single movement    Falls Prevention Plan:       No modifications necessary   Total: (5 or greater = High Risk): 0     [employer]. All Rights Reserved.  [place] States Patent #[id number]. Federal Law prohibits the replication, distribution or use without written permission from [facility]     Current Medications:    Current Outpatient Medications:     HYDROmorphone (DILAUDID) 2 mg tablet, Take 1 Tab by mouth every three (3) hours as needed for Pain. Max Daily Amount: 16 mg., Disp: 30 Tab, Rfl: 0    CETIRIZINE HCL (ZYRTEC PO), Take 10 mg by mouth Daily (before breakfast). , Disp: , Rfl:    Date Last Reviewed:  3/4/2019   Number of medical conditions (excluding presenting problem) that affect the Plan of Care: Brief history (0):  LOW COMPLEXITY   ASSESSMENT OF OCCUPATIONAL PERFORMANCE:   RANGE OF MOTION:     · AROM: Measurements were not taken due to recent surgery. STRENGTH:  Not tested yet. SENSATION:  The patient reports tingling in her left thumb area. Physical Skills Involved:  1. Range of Motion  2. Strength  3. Sensation  4. Pain (acute)  5. Edema Cognitive Skills Affected (resulting in the inability to perform in a timely and safe manner): 1. none Psychosocial Skills Affected:  1. none   Number of elements that affect the Plan of Care: 5+:  HIGH COMPLEXITY   CLINICAL DECISION MAKING:   Outcome Measure: Tool Used: Disabilities of the Arm, Shoulder and Hand (DASH) Questionnaire - Quick Version  Score:  Initial: 47/55  Most Recent: X/55 (Date: -- )   Interpretation of Score: The DASH is designed to measure the activities of daily living in person's with upper extremity dysfunction or pain. Each section is scored on a 1-5 scale, 5 representing the greatest disability. The scores of each section are added together for a total score of 55. Score 11 12-19 20-28 29-37 38-45 46-54 55   Modifier CH CI CJ CK CL CM CN     Medical Necessity:   · The patient is expected to increase motion, strength and function in her left hand improving her ability to perform ADL, and household activities.   Reason for Services/Other Comments:  · The patient has limited motion, strength and function in her left hand. .  Clinical Decision-Making Assessment:     Clinical Decision-Making: LOW COMPLEXITY   TREATMENT:   (In addition to Assessment/Re-Assessment sessions the following treatments were rendered)  Pre-treatment Symptoms/Complaints:  Pain and stiffness in left hand and wrist.  Pain: Initial:   Pain Intensity 1: 2  Pain Location 1: Hand, Wrist  Pain Orientation 1: Left  Post Session:  1     The patient was provided with a fabricated protective splint for her left thumb and wrist. ( 15 minutes ) 2/11/2019  Patient stated \"I was able to tie my shoes. \"    Manual Therapy: (Soft Tissue Mobilization Duration  Duration: 15 Minutes  Duration: 15 Minutes): Technique: Connective tissue, Retrograde massage  Tissue Mobilized: Scar/adhesion  LUE Soft Tissue Mobilization: Yes  Technique: Retrograde massage, Connective tissue  Tissue Mobilized: Scar/adhesion   Therapeutic Exercise:                                                                               : The patient's home exercise program was reviewed.                                                 Date:  3/4/19 Date:  2/19/19 Date:  2/21/19 Date:  2/25/19 Date:  2/27/19   Activity/Exercise Parameters Parameters Parameters Parameters Parameters   AROM during Fluidotherapy 20 min 20 min 20 min 20 min 20 min   Paraffin with Stretch 15 min   15 min 15 min 15 min 15 min   Retrograde massage, Friction Scar massage, Joint Mobilization 15 min   15 min 15 min  Light tx 15 min 15 min   Scarf Curl 5 min   5 min 5 min 5 min 5 min   Washer Game 5 min 5 min 5 min 5 min 5 min   Individual Gripper          Hand [place]          Blocking boards   5 min  5 min 5 min 5 min   Pegs          Clothes Pins          A-R Bar          Exerstick          Velcro-Roll          AROM EX 10 min 20 min 15 min 15 min 10 min   RESISTIVE EXERCISES Weight/ Sets/Reps   Weight/ Sets/Reps Weight/ Sets/Reps Weight/ Sets/Reps Weight/ Sets/Reps   WEIGHT WELL        Sup/Pro        UD/RD        Wrist Flex/Ext        Free Weights          UBE(Minutes)          Nautilus        Compound Row        Vertical Chest        Overhead Press                    HEP: As above; handouts given to patient for all exercises. Therapeutic Modalities:         Left Wrist Heat  Type: Paraffin bath  Duration: 15 minutes  Patient Position: Sitting                                     Joint Mobilization:        Treatment Times:  · Therapeutic Exercise: 30 minutes  · Manual Therapy: 15 minutes  · Parafin:15  minutes  · Whirlpool:  minutes  · Other:  minutes       Treatment/Session Assessment:    · Response to Treatment:  Patients tolerated treatment well with no complications. Upon completion of treatment, skin condition was normal..  · Compliance with Program/Exercises: Will assess as treatment progresses. · Recommendations/Intent for next treatment session: \"Next visit will focus on advancements to more challenging activities\". Will continue per MD.  Total Treatment Duration:  OT Patient Time In/Time Out  Time In: 0845  Time Out: 5 [facility], OT

## 2019-03-06 ENCOUNTER — HOSPITAL ENCOUNTER (OUTPATIENT)
Dept: PHYSICAL THERAPY | Age: 44
Discharge: HOME | End: 2019-03-06
Payer: COMMERCIAL

## 2019-03-06 PROCEDURE — 97018 PARAFFIN BATH THERAPY: CPT

## 2019-03-06 PROCEDURE — 97140 MANUAL THERAPY 1/> REGIONS: CPT

## 2019-03-06 PROCEDURE — 97110 THERAPEUTIC EXERCISES: CPT

## 2019-03-06 NOTE — PROGRESS NOTES
Anca Lynn  : 1975  Primary: Db Limon Of [facility]*  Secondary:  [facility] [place] [id number] [facility], [address] [place] [address],[address], [facility] [address].  [phone]   [phone]          OUTPATIENT OCCUPATIONAL THERAPY: Daily Note 3/6/2019     ICD-10: Treatment Diagnosis: Stiffness of left hand, not elsewhere classified (M25.642)Pain in left hand (E02.567)  Precautions/Allergies:   Patient has no known allergies. MD Orders: Evaluate and treat: custom molded TMA splint, ROM MEDICAL/REFERRING DIAGNOSIS:   Unilateral primary osteoarthritis of first carpometacarpal joint, left hand [M18.12]   DATE OF ONSET: several years ago  DATE OF SURGERY: 2019   REFERRING PHYSICIAN: Marta Burns MD  RETURN PHYSICIAN APPOINTMENT: 4 weeks     INITIAL ASSESSMENT:  Ms. David Moss presents with decreased functional use, strength and range of motion of her left hand and upper extremity that is affecting her independence with activities of daily living and ability to perform job tasks. I feel that Ms. David Moss will benefit from skilled occupational therapy to maximize the functional use of her hand and upper extremity in daily activities . PLAN OF CARE:   PROBLEM LIST:  1. Pain in left hand. 2. Decreased motion in left hand. 3. Decreased strength in left hand. INTERVENTIONS PLANNED:  1. Modalities that may include fluidotherapy, paraffin, ultrasound, and light therapy. 2. Therapeutic exercise including a home exercise program.  3. Manual therapy. 4. Therapeutic activities. TREATMENT PLAN:  Effective Dates: 2019 TO 2019 (90 days). Frequency/Duration: 2 times a week for 90 Days  GOALS: (Goals have been discussed and agreed upon with patient.)  Short-Term Functional Goals: Time Frame: 4 weeks  1. Decrease pain to 5 to allow patient to perform self care tasks.   2. Increase motion in left hand by 20 degrees to improve functional use of upper extremity in ADL activities. 3. Increase strength in left hand by 10 pounds to allow patient to  and lift objects during self care activities. Discharge Goals: Time Frame: 12 weeks  1. Decrease pain to 2 to allow patient to perform all household  tasks. 2. Increase motion in left hand by 30 degreees to allow patient to perform all ADL activities. 3. Increase strength in left hand by 20 pounds to allow patient to , lift, hold, and carry heavy objects. Rehabilitation Potential For Stated Goals: Good  Regarding New Theron therapy, I certify that the treatment plan above will be carried out by a therapist or under their direction. Thank you for this referral,  Hannah Jones OT       Referring Physician Signature: Lashae Haddad MD _________________________  Date _________            The information in this section was collected on 2019 (except where otherwise noted). OCCUPATIONAL PROFILE & HISTORY:   History of Present Injury/Illness (Reason for Referral): The patient has had increasing arthritis pain in her left thumb for several years. Past Medical History/Comorbidities:   Ms. Kanchan Hogan  has a past medical history of Allergic rhinitis, Anxiety, Arthritis, Chicken pox, and Pneumonia. Ms. Kanchan Hogan  has a past surgical history that includes radha biopsy breast stereotactic (); hx  section (); hx breast biopsy (Right); hx breast augmentation; implant breast silicone/eq; and hx orthopaedic (Left). Social History/Living Environment:      Prior Level of Function/Work/Activity:  independent  Dominant Side:         RIGHT     Ambulatory/Rehab Services H2 Model Falls Risk Assessment    Risk Factors:       No Risk Factors Identified Ability to Rise from Chair:       (0)  Ability to rise in a single movement    Falls Prevention Plan:       No modifications necessary   Total: (5 or greater = High Risk): 0     [employer] of [employer]. All Rights Reserved.  [facility] Patent #[id number]. Federal Law prohibits the replication, distribution or use without written permission from [facility] [employer] [facility]     Current Medications:    Current Outpatient Medications:     HYDROmorphone (DILAUDID) 2 mg tablet, Take 1 Tab by mouth every three (3) hours as needed for Pain. Max Daily Amount: 16 mg., Disp: 30 Tab, Rfl: 0    CETIRIZINE HCL (ZYRTEC PO), Take 10 mg by mouth Daily (before breakfast). , Disp: , Rfl:    Date Last Reviewed:  3/4/2019   Number of medical conditions (excluding presenting problem) that affect the Plan of Care: Brief history (0):  LOW COMPLEXITY   ASSESSMENT OF OCCUPATIONAL PERFORMANCE:   RANGE OF MOTION:     · AROM: Measurements were not taken due to recent surgery. STRENGTH:  Not tested yet. SENSATION:  The patient reports tingling in her left thumb area. Physical Skills Involved:  1. Range of Motion  2. Strength  3. Sensation  4. Pain (acute)  5. Edema Cognitive Skills Affected (resulting in the inability to perform in a timely and safe manner): 1. none Psychosocial Skills Affected:  1. none   Number of elements that affect the Plan of Care: 5+:  HIGH COMPLEXITY   CLINICAL DECISION MAKING:   Outcome Measure: Tool Used: Disabilities of the Arm, Shoulder and Hand (DASH) Questionnaire - Quick Version  Score:  Initial: 47/55  Most Recent: X/55 (Date: -- )   Interpretation of Score: The DASH is designed to measure the activities of daily living in person's with upper extremity dysfunction or pain. Each section is scored on a 1-5 scale, 5 representing the greatest disability. The scores of each section are added together for a total score of 55. Score 11 12-19 20-28 29-37 38-45 46-54 55   Modifier CH CI CJ CK CL CM CN     Medical Necessity:   · The patient is expected to increase motion, strength and function in her left hand improving her ability to perform ADL, and household activities.   Reason for Services/Other Comments:  · The patient has limited motion, strength and function in her left hand. .  Clinical Decision-Making Assessment:     Clinical Decision-Making: LOW COMPLEXITY   TREATMENT:   (In addition to Assessment/Re-Assessment sessions the following treatments were rendered)  Pre-treatment Symptoms/Complaints:  Pain and stiffness in left hand and wrist.  Pain: Initial:   Pain Intensity 1: 2  Pain Location 1: Hand, Wrist  Pain Orientation 1: Left  Post Session:  1     The patient was provided with a fabricated protective splint for her left thumb and wrist. ( 15 minutes ) 2/11/2019  Patient stated \"I am able to do more and more. Bryan Bui \"    Manual Therapy: (Soft Tissue Mobilization Duration  Duration: 15 Minutes  Duration: 15 Minutes): Technique: Retrograde massage  Tissue Mobilized: Scar/adhesion  LUE Soft Tissue Mobilization: Yes  Technique: Retrograde massage  Tissue Mobilized: Scar/adhesion   Therapeutic Exercise:                                                                               : The patient's home exercise program was reviewed.                                                 Date:  3/4/19 Date:  3/6/19 Date:  2/21/19 Date:  2/25/19 Date:  2/27/19   Activity/Exercise Parameters Parameters Parameters Parameters Parameters   AROM during Fluidotherapy 20 min 20 min 20 min 20 min 20 min   Paraffin with Stretch 15 min   15 min 15 min 15 min 15 min   Retrograde massage, Friction Scar massage, Joint Mobilization 15 min   15 min 15 min  Light tx 15 min 15 min   Scarf Curl 5 min   5 min 5 min 5 min 5 min   Washer Game 5 min 5 min 5 min 5 min 5 min   Individual Gripper          Hand [place]          Blocking boards   5 min 5 min 5 min 5 min 5 min   Pegs          Clothes Pins          A-R Bar          Exerstick          Velcro-Roll          AROM EX 10 min 10 min 15 min 15 min 10 min   RESISTIVE EXERCISES Weight/ Sets/Reps   Weight/ Sets/Reps Weight/ Sets/Reps Weight/ Sets/Reps Weight/ Sets/Reps   WEIGHT WELL Sup/Pro        UD/RD        Wrist Flex/Ext        Free Weights          UBE(Minutes)          Nautilus        Compound Row        Vertical Chest        Overhead Press                    HEP: As above; handouts given to patient for all exercises. Therapeutic Modalities:         Left Wrist Heat  Type: Paraffin bath(with a finger flexion stretch)  Duration: 15 minutes  Patient Position: Sitting                                     Joint Mobilization:        Treatment Times:  · Therapeutic Exercise: 30 minutes  · Manual Therapy: 15 minutes  · Parafin:15  minutes  · Whirlpool:  minutes  · Other:  minutes       Treatment/Session Assessment:    · Response to Treatment:  Patients tolerated treatment well with no complications. Upon completion of treatment, skin condition was normal..  · Compliance with Program/Exercises: Will assess as treatment progresses. · Recommendations/Intent for next treatment session: \"Next visit will focus on advancements to more challenging activities\". Will continue per MD.  Total Treatment Duration:  OT Patient Time In/Time Out  Time In: 0945  Time Out: [address]

## 2019-03-11 ENCOUNTER — HOSPITAL ENCOUNTER (OUTPATIENT)
Dept: PHYSICAL THERAPY | Age: 44
Discharge: HOME | End: 2019-03-11
Payer: COMMERCIAL

## 2019-03-11 PROCEDURE — 97140 MANUAL THERAPY 1/> REGIONS: CPT

## 2019-03-11 PROCEDURE — 97110 THERAPEUTIC EXERCISES: CPT

## 2019-03-11 PROCEDURE — 97018 PARAFFIN BATH THERAPY: CPT

## 2019-03-11 NOTE — PROGRESS NOTES
Nash Self  : 1975  Primary: Merlinda Leyland Of [facility] Mehta*  Secondary:  [facility] [place] [id number] [facility], [address] [place] [address],[address], [address]  [phone]   [phone]          OUTPATIENT OCCUPATIONAL THERAPY: Daily Note 3/11/2019     ICD-10: Treatment Diagnosis: Stiffness of left hand, not elsewhere classified (M25.642)Pain in left hand (W35.669)  Precautions/Allergies:   Patient has no known allergies. MD Orders: Evaluate and treat: custom molded TMA splint, ROM MEDICAL/REFERRING DIAGNOSIS:   Unilateral primary osteoarthritis of first carpometacarpal joint, left hand [M18.12]   DATE OF ONSET: several years ago  DATE OF SURGERY: 2019   REFERRING PHYSICIAN: Gregg James MD  RETURN PHYSICIAN APPOINTMENT: 4 weeks     INITIAL ASSESSMENT:  Ms. Tomasa Greer presents with decreased functional use, strength and range of motion of her left hand and upper extremity that is affecting her independence with activities of daily living and ability to perform job tasks. I feel that Ms. Tomasa Greer will benefit from skilled occupational therapy to maximize the functional use of her hand and upper extremity in daily activities . PLAN OF CARE:   PROBLEM LIST:  1. Pain in left hand. 2. Decreased motion in left hand. 3. Decreased strength in left hand. INTERVENTIONS PLANNED:  1. Modalities that may include fluidotherapy, paraffin, ultrasound, and light therapy. 2. Therapeutic exercise including a home exercise program.  3. Manual therapy. 4. Therapeutic activities. TREATMENT PLAN:  Effective Dates: 2019 TO 2019 (90 days). Frequency/Duration: 2 times a week for 90 Days  GOALS: (Goals have been discussed and agreed upon with patient.)  Short-Term Functional Goals: Time Frame: 4 weeks  1. Decrease pain to 5 to allow patient to perform self care tasks.   2. Increase motion in left hand by 20 degrees to improve functional use of upper extremity in ADL activities. 3. Increase strength in left hand by 10 pounds to allow patient to  and lift objects during self care activities. Discharge Goals: Time Frame: 12 weeks  1. Decrease pain to 2 to allow patient to perform all household  tasks. 2. Increase motion in left hand by 30 degreees to allow patient to perform all ADL activities. 3. Increase strength in left hand by 20 pounds to allow patient to , lift, hold, and carry heavy objects. Rehabilitation Potential For Stated Goals: Good  Regarding New Theron therapy, I certify that the treatment plan above will be carried out by a therapist or under their direction. Thank you for this referral,  Iam Arango OT       Referring Physician Signature: Kannan José MD _________________________  Date _________            The information in this section was collected on 2019 (except where otherwise noted). OCCUPATIONAL PROFILE & HISTORY:   History of Present Injury/Illness (Reason for Referral): The patient has had increasing arthritis pain in her left thumb for several years. Past Medical History/Comorbidities:   Ms. Sabrina Means  has a past medical history of Allergic rhinitis, Anxiety, Arthritis, Chicken pox, and Pneumonia. Ms. Sabrina Means  has a past surgical history that includes radha biopsy breast stereotactic (); hx  section (); hx breast biopsy (Right); hx breast augmentation; implant breast silicone/eq; and hx orthopaedic (Left). Social History/Living Environment:      Prior Level of Function/Work/Activity:  independent  Dominant Side:         RIGHT     Ambulatory/Rehab Services H2 Model Falls Risk Assessment    Risk Factors:       No Risk Factors Identified Ability to Rise from Chair:       (0)  Ability to rise in a single movement    Falls Prevention Plan:       No modifications necessary   Total: (5 or greater = High Risk): 0     [employer]. All Rights Reserved.  [facility] Patent #[id number]. Federal Law prohibits the replication, distribution or use without written permission from [facility] of [address]     Current Medications:    Current Outpatient Medications:     HYDROmorphone (DILAUDID) 2 mg tablet, Take 1 Tab by mouth every three (3) hours as needed for Pain. Max Daily Amount: 16 mg., Disp: 30 Tab, Rfl: 0    CETIRIZINE HCL (ZYRTEC PO), Take 10 mg by mouth Daily (before breakfast). , Disp: , Rfl:    Date Last Reviewed:  3/11/2019   Number of medical conditions (excluding presenting problem) that affect the Plan of Care: Brief history (0):  LOW COMPLEXITY   ASSESSMENT OF OCCUPATIONAL PERFORMANCE:   RANGE OF MOTION:     · AROM: Measurements were not taken due to recent surgery. STRENGTH:  Not tested yet. SENSATION:  The patient reports tingling in her left thumb area. Physical Skills Involved:  1. Range of Motion  2. Strength  3. Sensation  4. Pain (acute)  5. Edema Cognitive Skills Affected (resulting in the inability to perform in a timely and safe manner): 1. none Psychosocial Skills Affected:  1. none   Number of elements that affect the Plan of Care: 5+:  HIGH COMPLEXITY   CLINICAL DECISION MAKING:   Outcome Measure: Tool Used: Disabilities of the Arm, Shoulder and Hand (DASH) Questionnaire - Quick Version  Score:  Initial: 47/55  Most Recent: X/55 (Date: -- )   Interpretation of Score: The DASH is designed to measure the activities of daily living in person's with upper extremity dysfunction or pain. Each section is scored on a 1-5 scale, 5 representing the greatest disability. The scores of each section are added together for a total score of 55. Score 11 12-19 20-28 29-37 38-45 46-54 55   Modifier CH CI CJ CK CL CM CN     Medical Necessity:   · The patient is expected to increase motion, strength and function in her left hand improving her ability to perform ADL, and household activities.   Reason for Services/Other Comments:  · The patient has limited motion, strength and function in her left hand. .  Clinical Decision-Making Assessment:     Clinical Decision-Making: LOW COMPLEXITY   TREATMENT:   (In addition to Assessment/Re-Assessment sessions the following treatments were rendered)  Pre-treatment Symptoms/Complaints:  Pain and stiffness in left hand and wrist.  Pain: Initial:   Pain Intensity 1: 2  Pain Location 1: Hand, Wrist  Pain Orientation 1: Left  Post Session:  1     The patient was provided with a fabricated protective splint for her left thumb and wrist. ( 15 minutes ) 2/11/2019  Patient stated \"I still do not have much movement in my thumb. \"    Manual Therapy: (Soft Tissue Mobilization Duration  Duration: 15 Minutes  Duration: 15 Minutes): Technique: Retrograde massage, Connective tissue  Tissue Mobilized: Scar/adhesion  LUE Soft Tissue Mobilization: Yes  Technique: Retrograde massage  Tissue Mobilized: Scar/adhesion   Therapeutic Exercise:                                                                               : The patient's home exercise program was reviewed.                                                 Date:  3/4/19 Date:  3/6/19 Date:  3/11/19 Date:  2/25/19 Date:  2/27/19   Activity/Exercise Parameters Parameters Parameters Parameters Parameters   AROM during Fluidotherapy 20 min 20 min 20 min 20 min 20 min   Paraffin with Stretch 15 min   15 min 15 min 15 min 15 min   Retrograde massage, Friction Scar massage, Joint Mobilization 15 min   15 min 15 min   15 min 15 min   Scarf Curl 5 min   5 min 5 min 5 min 5 min   Washer Game 5 min 5 min 5 min 5 min 5 min   Individual Gripper          Hand [place]          Blocking boards   5 min 5 min 5 min 5 min 5 min   Pegs          Clothes Pins          A-R Bar          Exerstick          Velcro-Roll          AROM EX 10 min 10 min 10 min 15 min 10 min   RESISTIVE EXERCISES Weight/ Sets/Reps   Weight/ Sets/Reps Weight/ Sets/Reps Weight/ Sets/Reps Weight/ Sets/Reps WEIGHT WELL        Sup/Pro        UD/RD        Wrist Flex/Ext        Free Weights          UBE(Minutes)          Nautilus        Compound Row        Vertical Chest        Overhead Press                    HEP: As above; handouts given to patient for all exercises. Therapeutic Modalities:         Left Wrist Heat  Type: Paraffin bath(with a finger flexion stretch)  Duration: 15 minutes  Patient Position: Sitting                                     Joint Mobilization:        Treatment Times:  · Therapeutic Exercise: 30 minutes  · Manual Therapy: 15 minutes  · Parafin:15  minutes  · Whirlpool:  minutes  · Other:  minutes       Treatment/Session Assessment:    · Response to Treatment:  Patients tolerated treatment well with no complications. Upon completion of treatment, skin condition was normal..  · Compliance with Program/Exercises: Will assess as treatment progresses. · Recommendations/Intent for next treatment session: \"Next visit will focus on advancements to more challenging activities\"MD progress note completed. .Will continue per MD.  Total Treatment Duration:  OT Patient Time In/Time Out  Time In: 3175  Time Out: Otis Ramos OT

## 2019-03-13 ENCOUNTER — HOSPITAL ENCOUNTER (OUTPATIENT)
Dept: PHYSICAL THERAPY | Age: 44
Discharge: HOME | End: 2019-03-13
Payer: COMMERCIAL

## 2019-03-13 PROCEDURE — 97018 PARAFFIN BATH THERAPY: CPT

## 2019-03-13 PROCEDURE — 97110 THERAPEUTIC EXERCISES: CPT

## 2019-03-13 PROCEDURE — 97140 MANUAL THERAPY 1/> REGIONS: CPT

## 2019-03-13 NOTE — PROGRESS NOTES
Mattie Leyden  : 1975  Primary: Steve Gill [facility]*  Secondary:  Therapy Center at [address], [address] [place] [address],[address] [facility], [place] [address].  [phone]   [phone]          OUTPATIENT OCCUPATIONAL THERAPY: Daily Note 3/13/2019     ICD-10: Treatment Diagnosis: Stiffness of left hand, not elsewhere classified (M25.642)Pain in left hand (B23.238)  Precautions/Allergies:   Patient has no known allergies. MD Orders: Evaluate and treat: custom molded TMA splint, ROM MEDICAL/REFERRING DIAGNOSIS:   Unilateral primary osteoarthritis of first carpometacarpal joint, left hand [M18.12]   DATE OF ONSET: several years ago  DATE OF SURGERY: 2019   REFERRING PHYSICIAN: Galina Rojo MD  RETURN PHYSICIAN APPOINTMENT: 4 weeks     INITIAL ASSESSMENT:  Ms. Jonna Fregoso presents with decreased functional use, strength and range of motion of her left hand and upper extremity that is affecting her independence with activities of daily living and ability to perform job tasks. I feel that Ms. Jonna Fregoso will benefit from skilled occupational therapy to maximize the functional use of her hand and upper extremity in daily activities . PLAN OF CARE:   PROBLEM LIST:  1. Pain in left hand. 2. Decreased motion in left hand. 3. Decreased strength in left hand. INTERVENTIONS PLANNED:  1. Modalities that may include fluidotherapy, paraffin, ultrasound, and light therapy. 2. Therapeutic exercise including a home exercise program.  3. Manual therapy. 4. Therapeutic activities. TREATMENT PLAN:  Effective Dates: 2019 TO 2019 (90 days). Frequency/Duration: 2 times a week for 90 Days  GOALS: (Goals have been discussed and agreed upon with patient.)  Short-Term Functional Goals: Time Frame: 4 weeks  1. Decrease pain to 5 to allow patient to perform self care tasks.   2. Increase motion in left hand by 20 degrees to improve functional use of upper extremity in ADL activities. 3. Increase strength in left hand by 10 pounds to allow patient to  and lift objects during self care activities. Discharge Goals: Time Frame: 12 weeks  1. Decrease pain to 2 to allow patient to perform all household  tasks. 2. Increase motion in left hand by 30 degreees to allow patient to perform all ADL activities. 3. Increase strength in left hand by 20 pounds to allow patient to , lift, hold, and carry heavy objects. Rehabilitation Potential For Stated Goals: Good  Regarding New Theron therapy, I certify that the treatment plan above will be carried out by a therapist or under their direction. Thank you for this referral,  Malik Siu OT       Referring Physician Signature: Donal Guillory MD _________________________  Date _________            The information in this section was collected on 2019 (except where otherwise noted). OCCUPATIONAL PROFILE & HISTORY:   History of Present Injury/Illness (Reason for Referral): The patient has had increasing arthritis pain in her left thumb for several years. Past Medical History/Comorbidities:   Ms. Bijan Rg  has a past medical history of Allergic rhinitis, Anxiety, Arthritis, Chicken pox, and Pneumonia. Ms. Bijan Rg  has a past surgical history that includes radha biopsy breast stereotactic (); hx  section (); hx breast biopsy (Right); hx breast augmentation; implant breast silicone/eq; and hx orthopaedic (Left). Social History/Living Environment:      Prior Level of Function/Work/Activity:  independent  Dominant Side:         RIGHT     Ambulatory/Rehab Services H2 Model Falls Risk Assessment    Risk Factors:       No Risk Factors Identified Ability to Rise from Chair:       (0)  Ability to rise in a single movement    Falls Prevention Plan:       No modifications necessary   Total: (5 or greater = High Risk): 0     [employer]. All Rights Reserved.  [place] States Patent #[id number]. Federal Law prohibits the replication, distribution or use without written permission from [facility] [employer] [facility]     Current Medications:    Current Outpatient Medications:     HYDROmorphone (DILAUDID) 2 mg tablet, Take 1 Tab by mouth every three (3) hours as needed for Pain. Max Daily Amount: 16 mg., Disp: 30 Tab, Rfl: 0    CETIRIZINE HCL (ZYRTEC PO), Take 10 mg by mouth Daily (before breakfast). , Disp: , Rfl:    Date Last Reviewed:  3/11/2019   Number of medical conditions (excluding presenting problem) that affect the Plan of Care: Brief history (0):  LOW COMPLEXITY   ASSESSMENT OF OCCUPATIONAL PERFORMANCE:   RANGE OF MOTION:     · AROM: Measurements were not taken due to recent surgery. STRENGTH:  Not tested yet. SENSATION:  The patient reports tingling in her left thumb area. Physical Skills Involved:  1. Range of Motion  2. Strength  3. Sensation  4. Pain (acute)  5. Edema Cognitive Skills Affected (resulting in the inability to perform in a timely and safe manner): 1. none Psychosocial Skills Affected:  1. none   Number of elements that affect the Plan of Care: 5+:  HIGH COMPLEXITY   CLINICAL DECISION MAKING:   Outcome Measure: Tool Used: Disabilities of the Arm, Shoulder and Hand (DASH) Questionnaire - Quick Version  Score:  Initial: 47/55  Most Recent: X/55 (Date: -- )   Interpretation of Score: The DASH is designed to measure the activities of daily living in person's with upper extremity dysfunction or pain. Each section is scored on a 1-5 scale, 5 representing the greatest disability. The scores of each section are added together for a total score of 55. Score 11 12-19 20-28 29-37 38-45 46-54 55   Modifier CH CI CJ CK CL CM CN     Medical Necessity:   · The patient is expected to increase motion, strength and function in her left hand improving her ability to perform ADL, and household activities.   Reason for Services/Other Comments:  · The patient has limited motion, strength and function in her left hand. .  Clinical Decision-Making Assessment:     Clinical Decision-Making: LOW COMPLEXITY   TREATMENT:   (In addition to Assessment/Re-Assessment sessions the following treatments were rendered)  Pre-treatment Symptoms/Complaints:  Pain and stiffness in left hand and wrist.  Pain: Initial:   Pain Intensity 1: 3  Pain Location 1: Hand, Wrist  Pain Orientation 1: Left  Post Session:  1     The patient was provided with a fabricated protective splint for her left thumb and wrist. ( 15 minutes ) 2/11/2019  Patient stated \"Dr. Kahlil Aguilar said we could begin strengthening. Benna Him \"    Manual Therapy: (Soft Tissue Mobilization Duration  Duration: 15 Minutes  Duration: 15 Minutes): Technique: Retrograde massage(followed by Light tx & PROM)  Tissue Mobilized: Scar/adhesion  LUE Soft Tissue Mobilization: Yes  Technique: Retrograde massage  Tissue Mobilized: Scar/adhesion   Therapeutic Exercise:                                                                               : The patient's home exercise program was reviewed.                                                 Date:  3/4/19 Date:  3/6/19 Date:  3/11/19 Date:  3/13/19 Date:  2/27/19   Activity/Exercise Parameters Parameters Parameters Parameters Parameters   AROM during Fluidotherapy 20 min 20 min 20 min 20 min 20 min   Paraffin with Stretch 15 min   15 min 15 min 15 min 15 min   Retrograde massage, Friction Scar massage, Joint Mobilization 15 min   15 min 15 min   15 min 15 min   Scarf Curl 5 min   5 min 5 min 5 min 5 min   Washer Game 5 min 5 min 5 min 5 min 5 min   Individual Gripper      15 reps    Hand [place]      15 reps    Blocking boards   5 min 5 min 5 min 2 min 5 min   Pegs          Clothes Pins      15 reps    A-R Bar          Exerstick      40 reps    Velcro-Roll          AROM EX 10 min 10 min 10 min 5 min 10 min   RESISTIVE EXERCISES Weight/ Sets/Reps   Weight/ Sets/Reps Weight/ Sets/Reps Weight/ Sets/Reps Weight/ Sets/Reps   WEIGHT WELL        Sup/Pro        UD/RD        Wrist Flex/Ext        Free Weights          UBE(Minutes)          Nautilus        Compound Row        Vertical Chest        Overhead Press                    HEP: As above; handouts given to patient for all exercises. Therapeutic Modalities:         Left Wrist Heat  Type: Paraffin bath(with a finger flexion stretch)  Duration: 15 minutes  Patient Position: Sitting                                     Joint Mobilization:        Treatment Times:  · Therapeutic Exercise: 30 minutes  · Manual Therapy: 15 minutes  · Parafin:15  minutes  · Whirlpool:  minutes  · Other:  minutes       Treatment/Session Assessment:    · Response to Treatment:  Patients tolerated treatment well with no complications. Upon completion of treatment, skin condition was normal..  · Compliance with Program/Exercises: Will assess as treatment progresses. · Recommendations/Intent for next treatment session: \"Next visit will focus on advancements to more challenging activities. .Will begin strengthening   per MD.  Total Treatment Duration:  OT Patient Time In/Time Out  Time In: 0800  Time Out: 0900    Martin Comer OT

## 2019-03-18 ENCOUNTER — HOSPITAL ENCOUNTER (OUTPATIENT)
Dept: PHYSICAL THERAPY | Age: 44
Discharge: HOME | End: 2019-03-18
Payer: COMMERCIAL

## 2019-03-18 PROCEDURE — 97110 THERAPEUTIC EXERCISES: CPT

## 2019-03-18 PROCEDURE — 97018 PARAFFIN BATH THERAPY: CPT

## 2019-03-18 PROCEDURE — 97140 MANUAL THERAPY 1/> REGIONS: CPT

## 2019-03-18 NOTE — PROGRESS NOTES
Emanuel Gracia  : 1975  Primary: Lakeshia Casiano [facility]*  Secondary:  [facility]rapy Center at [place] [id number] [facility], [address] [place] [address],[address], [facility].  [phone]   [phone]          OUTPATIENT OCCUPATIONAL THERAPY: Daily Note 3/18/2019     ICD-10: Treatment Diagnosis: Stiffness of left hand, not elsewhere classified (M25.642)Pain in left hand (G84.221)  Precautions/Allergies:   Patient has no known allergies. MD Orders: Evaluate and treat: custom molded TMA splint, ROM MEDICAL/REFERRING DIAGNOSIS:   Unilateral primary osteoarthritis of first carpometacarpal joint, left hand [M18.12]   DATE OF ONSET: several years ago  DATE OF SURGERY: 2019   REFERRING PHYSICIAN: Kannan José MD  RETURN PHYSICIAN APPOINTMENT: 4 weeks     INITIAL ASSESSMENT:  Ms. Sabrina Means presents with decreased functional use, strength and range of motion of her left hand and upper extremity that is affecting her independence with activities of daily living and ability to perform job tasks. I feel that Ms. Sabrina Means will benefit from skilled occupational therapy to maximize the functional use of her hand and upper extremity in daily activities . PLAN OF CARE:   PROBLEM LIST:  1. Pain in left hand. 2. Decreased motion in left hand. 3. Decreased strength in left hand. INTERVENTIONS PLANNED:  1. Modalities that may include fluidotherapy, paraffin, ultrasound, and light therapy. 2. Therapeutic exercise including a home exercise program.  3. Manual therapy. 4. Therapeutic activities. TREATMENT PLAN:  Effective Dates: 2019 TO 2019 (90 days). Frequency/Duration: 2 times a week for 90 Days  GOALS: (Goals have been discussed and agreed upon with patient.)  Short-Term Functional Goals: Time Frame: 4 weeks  1. Decrease pain to 5 to allow patient to perform self care tasks.   2. Increase motion in left hand by 20 degrees to improve functional use of upper extremity in ADL activities. 3. Increase strength in left hand by 10 pounds to allow patient to  and lift objects during self care activities. Discharge Goals: Time Frame: 12 weeks  1. Decrease pain to 2 to allow patient to perform all household  tasks. 2. Increase motion in left hand by 30 degreees to allow patient to perform all ADL activities. 3. Increase strength in left hand by 20 pounds to allow patient to , lift, hold, and carry heavy objects. Rehabilitation Potential For Stated Goals: Good  Regarding New Theron therapy, I certify that the treatment plan above will be carried out by a therapist or under their direction. Thank you for this referral,  Ade Pino, OT       Referring Physician Signature: Adam Ku MD _________________________  Date _________            The information in this section was collected on 2019 (except where otherwise noted). OCCUPATIONAL PROFILE & HISTORY:   History of Present Injury/Illness (Reason for Referral): The patient has had increasing arthritis pain in her left thumb for several years. Past Medical History/Comorbidities:   Ms. Peggy Pineda  has a past medical history of Allergic rhinitis, Anxiety, Arthritis, Chicken pox, and Pneumonia. Ms. Peggy Pineda  has a past surgical history that includes radha biopsy breast stereotactic (); hx  section (); hx breast biopsy (Right); hx breast augmentation; implant breast silicone/eq; and hx orthopaedic (Left). Social History/Living Environment:      Prior Level of Function/Work/Activity:  independent  Dominant Side:         RIGHT     Ambulatory/Rehab Services H2 Model Falls Risk Assessment    Risk Factors:       No Risk Factors Identified Ability to Rise from Chair:       (0)  Ability to rise in a single movement    Falls Prevention Plan:       No modifications necessary   Total: (5 or greater = High Risk): 0     [employer]. All Rights Reserved.  [employer] States Patent #[id number]. Federal Law prohibits the replication, distribution or use without written permission from [facility] [employer] [facility]     Current Medications:    Current Outpatient Medications:     HYDROmorphone (DILAUDID) 2 mg tablet, Take 1 Tab by mouth every three (3) hours as needed for Pain. Max Daily Amount: 16 mg., Disp: 30 Tab, Rfl: 0    CETIRIZINE HCL (ZYRTEC PO), Take 10 mg by mouth Daily (before breakfast). , Disp: , Rfl:    Date Last Reviewed:  3/18/2019   Number of medical conditions (excluding presenting problem) that affect the Plan of Care: Brief history (0):  LOW COMPLEXITY   ASSESSMENT OF OCCUPATIONAL PERFORMANCE:   RANGE OF MOTION:     · AROM: Measurements were not taken due to recent surgery. STRENGTH:  Not tested yet. SENSATION:  The patient reports tingling in her left thumb area. Physical Skills Involved:  1. Range of Motion  2. Strength  3. Sensation  4. Pain (acute)  5. Edema Cognitive Skills Affected (resulting in the inability to perform in a timely and safe manner): 1. none Psychosocial Skills Affected:  1. none   Number of elements that affect the Plan of Care: 5+:  HIGH COMPLEXITY   CLINICAL DECISION MAKING:   Outcome Measure: Tool Used: Disabilities of the Arm, Shoulder and Hand (DASH) Questionnaire - Quick Version  Score:  Initial: 47/55  Most Recent: X/55 (Date: -- )   Interpretation of Score: The DASH is designed to measure the activities of daily living in person's with upper extremity dysfunction or pain. Each section is scored on a 1-5 scale, 5 representing the greatest disability. The scores of each section are added together for a total score of 55. Score 11 12-19 20-28 29-37 38-45 46-54 55   Modifier CH CI CJ CK CL CM CN     Medical Necessity:   · The patient is expected to increase motion, strength and function in her left hand improving her ability to perform ADL, and household activities.   Reason for Services/Other Comments:  · The patient has limited motion, strength and function in her left hand. .  Clinical Decision-Making Assessment:     Clinical Decision-Making: LOW COMPLEXITY   TREATMENT:   (In addition to Assessment/Re-Assessment sessions the following treatments were rendered)  Pre-treatment Symptoms/Complaints:  Pain and stiffness in left hand and wrist.  Pain: Initial:   Pain Intensity 1: 3  Pain Location 1: Hand, Wrist  Pain Orientation 1: Left  Post Session:  1     The patient was provided with a fabricated protective splint for her left thumb and wrist. ( 15 minutes ) 2/11/2019  Patient stated \"Dr. Jaspal Santana said we could begin strengthening. Akira Fung \"    Manual Therapy: (Soft Tissue Mobilization Duration  Duration: 15 Minutes  Duration: 15 Minutes): Technique: Retrograde massage  Tissue Mobilized: Scar/adhesion  LUE Soft Tissue Mobilization: Yes  Technique: Retrograde massage  Tissue Mobilized: Scar/adhesion   Therapeutic Exercise:                                                                               : The patient's home exercise program was reviewed.                                                 Date:  3/4/19 Date:  3/6/19 Date:  3/11/19 Date:  3/13/19 Date:  3/18/19   Activity/Exercise Parameters Parameters Parameters Parameters Parameters   AROM during Fluidotherapy 20 min 20 min 20 min 20 min 20 min   Paraffin with Stretch 15 min   15 min 15 min 15 min 15 min   Retrograde massage, Friction Scar massage, Joint Mobilization 15 min   15 min 15 min   15 min 15 min   Scarf Curl 5 min   5 min 5 min 5 min 5 min   Washer Game 5 min 5 min 5 min 5 min 5 min   Individual Gripper      15 reps 20 reps   Hand [place]      15 reps 20 reps   Blocking boards   5 min 5 min 5 min 2 min 5 min   Pegs          Clothes Pins      15 reps 20 reps   A-R Bar          Exerstick      40 reps 40 reps   Velcro-Roll          AROM EX 10 min 10 min 10 min 5 min 2 min   RESISTIVE EXERCISES Weight/ Sets/Reps   Weight/ Sets/Reps Weight/ Sets/Reps Weight/ Sets/Reps Weight/ Sets/Reps   WEIGHT WELL        Sup/Pro        UD/RD        Wrist Flex/Ext        Free Weights          UBE(Minutes)          Nautilus        Compound Row        Vertical Chest        Overhead Press                    HEP: As above; handouts given to patient for all exercises. Therapeutic Modalities:         Left Wrist Heat  Type: Paraffin bath(with a finger flexion stretch)  Duration: 15 minutes  Patient Position: Sitting                                     Joint Mobilization:        Treatment Times:  · Therapeutic Exercise: 30 minutes  · Manual Therapy: 15 minutes  · Parafin:15  minutes  · Whirlpool:  minutes  · Other:  minutes       Treatment/Session Assessment:    · Response to Treatment:  Patients tolerated treatment well with no complications. Upon completion of treatment, skin condition was normal..  · Compliance with Program/Exercises: Will assess as treatment progresses. · Recommendations/Intent for next treatment session: \"Next visit will focus on advancements to more challenging activities. .Will begin strengthening   per MD.  Total Treatment Duration:  OT Patient Time In/Time Out  Time In: 0800  Time Out: 0900    Tatyana Dubose OT

## 2019-03-20 ENCOUNTER — APPOINTMENT (OUTPATIENT)
Dept: PHYSICAL THERAPY | Age: 44
End: 2019-03-20
Payer: COMMERCIAL

## 2019-03-20 ENCOUNTER — HOSPITAL ENCOUNTER (OUTPATIENT)
Dept: PHYSICAL THERAPY | Age: 44
Discharge: HOME | End: 2019-03-20
Payer: COMMERCIAL

## 2019-03-20 PROCEDURE — 97018 PARAFFIN BATH THERAPY: CPT

## 2019-03-20 PROCEDURE — 97140 MANUAL THERAPY 1/> REGIONS: CPT

## 2019-03-20 PROCEDURE — 97110 THERAPEUTIC EXERCISES: CPT

## 2019-03-24 NOTE — PROGRESS NOTES
Chica Doherty  : 1975  Primary: Dale Raymond [facility]*  Secondary:  [facility] at [address] [place] [address], [address] [place] [address],[address], [place] [address].  [phone]   [phone]          OUTPATIENT OCCUPATIONAL THERAPY: Daily Note 3/20/2019     ICD-10: Treatment Diagnosis: Stiffness of left hand, not elsewhere classified (M25.642)Pain in left hand (V11.668)  Precautions/Allergies:   Patient has no known allergies. MD Orders: Evaluate and treat: custom molded TMA splint, ROM MEDICAL/REFERRING DIAGNOSIS:   Unilateral primary osteoarthritis of first carpometacarpal joint, left hand [M18.12]   DATE OF ONSET: several years ago  DATE OF SURGERY: 2019   REFERRING PHYSICIAN: Maryellen Meckel, MD  RETURN PHYSICIAN APPOINTMENT: 4 weeks     INITIAL ASSESSMENT:  Ms. Gume Schmitt presents with decreased functional use, strength and range of motion of her left hand and upper extremity that is affecting her independence with activities of daily living and ability to perform job tasks. I feel that Ms. Gume Schmitt will benefit from skilled occupational therapy to maximize the functional use of her hand and upper extremity in daily activities . PLAN OF CARE:   PROBLEM LIST:  1. Pain in left hand. 2. Decreased motion in left hand. 3. Decreased strength in left hand. INTERVENTIONS PLANNED:  1. Modalities that may include fluidotherapy, paraffin, ultrasound, and light therapy. 2. Therapeutic exercise including a home exercise program.  3. Manual therapy. 4. Therapeutic activities. TREATMENT PLAN:  Effective Dates: 2019 TO 2019 (90 days). Frequency/Duration: 2 times a week for 90 Days  GOALS: (Goals have been discussed and agreed upon with patient.)  Short-Term Functional Goals: Time Frame: 4 weeks  1. Decrease pain to 5 to allow patient to perform self care tasks.   2. Increase motion in left hand by 20 degrees to improve functional use of upper extremity in ADL activities. 3. Increase strength in left hand by 10 pounds to allow patient to  and lift objects during self care activities. Discharge Goals: Time Frame: 12 weeks  1. Decrease pain to 2 to allow patient to perform all household  tasks. 2. Increase motion in left hand by 30 degreees to allow patient to perform all ADL activities. 3. Increase strength in left hand by 20 pounds to allow patient to , lift, hold, and carry heavy objects. Rehabilitation Potential For Stated Goals: Good  Regarding New Theron therapy, I certify that the treatment plan above will be carried out by a therapist or under their direction. Thank you for this referral,  Jovanna Bellamy OT       Referring Physician Signature: Kay Griffith MD _________________________  Date _________            The information in this section was collected on 2019 (except where otherwise noted). OCCUPATIONAL PROFILE & HISTORY:   History of Present Injury/Illness (Reason for Referral): The patient has had increasing arthritis pain in her left thumb for several years. Past Medical History/Comorbidities:   Ms. Frantz Rahman  has a past medical history of Allergic rhinitis, Anxiety, Arthritis, Chicken pox, and Pneumonia. Ms. Frantz Rahman  has a past surgical history that includes radha biopsy breast stereotactic (); hx  section (); hx breast biopsy (Right); hx breast augmentation; implant breast silicone/eq; and hx orthopaedic (Left). Social History/Living Environment:      Prior Level of Function/Work/Activity:  independent  Dominant Side:         RIGHT     Ambulatory/Rehab Services H2 Model Falls Risk Assessment    Risk Factors:       No Risk Factors Identified Ability to Rise from Chair:       (0)  Ability to rise in a single movement    Falls Prevention Plan:       No modifications necessary   Total: (5 or greater = High Risk): 0     [employer]. All Rights Reserved.  [employer] Patent #[id number]. Federal Law prohibits the replication, distribution or use without written permission from [facility] [employer] [facility]     Current Medications:    Current Outpatient Medications:     HYDROmorphone (DILAUDID) 2 mg tablet, Take 1 Tab by mouth every three (3) hours as needed for Pain. Max Daily Amount: 16 mg., Disp: 30 Tab, Rfl: 0    CETIRIZINE HCL (ZYRTEC PO), Take 10 mg by mouth Daily (before breakfast). , Disp: , Rfl:    Date Last Reviewed:  3/18/2019   Number of medical conditions (excluding presenting problem) that affect the Plan of Care: Brief history (0):  LOW COMPLEXITY   ASSESSMENT OF OCCUPATIONAL PERFORMANCE:   RANGE OF MOTION:     · AROM: Measurements were not taken due to recent surgery. STRENGTH:  Not tested yet. SENSATION:  The patient reports tingling in her left thumb area. Physical Skills Involved:  1. Range of Motion  2. Strength  3. Sensation  4. Pain (acute)  5. Edema Cognitive Skills Affected (resulting in the inability to perform in a timely and safe manner): 1. none Psychosocial Skills Affected:  1. none   Number of elements that affect the Plan of Care: 5+:  HIGH COMPLEXITY   CLINICAL DECISION MAKING:   Outcome Measure: Tool Used: Disabilities of the Arm, Shoulder and Hand (DASH) Questionnaire - Quick Version  Score:  Initial: 47/55  Most Recent: X/55 (Date: -- )   Interpretation of Score: The DASH is designed to measure the activities of daily living in person's with upper extremity dysfunction or pain. Each section is scored on a 1-5 scale, 5 representing the greatest disability. The scores of each section are added together for a total score of 55. Score 11 12-19 20-28 29-37 38-45 46-54 55   Modifier CH CI CJ CK CL CM CN     Medical Necessity:   · The patient is expected to increase motion, strength and function in her left hand improving her ability to perform ADL, and household activities.   Reason for Services/Other Comments:  · The patient has limited motion, strength and function in her left hand. .  Clinical Decision-Making Assessment:     Clinical Decision-Making: LOW COMPLEXITY   TREATMENT:   (In addition to Assessment/Re-Assessment sessions the following treatments were rendered)  Pre-treatment Symptoms/Complaints:  Pain and stiffness in left hand and wrist.  Pain: Initial:   Pain Intensity 1: 3  Pain Location 1: Hand, Wrist  Pain Orientation 1: Left  Post Session:  1     The patient was provided with a fabricated protective splint for her left thumb and wrist. ( 15 minutes ) 2/11/2019  Patient stated \"I am using my hand more. \"    Manual Therapy: (Soft Tissue Mobilization Duration  Duration: 15 Minutes  Duration: 15 Minutes): Technique: Retrograde massage  Tissue Mobilized: Scar/adhesion  LUE Soft Tissue Mobilization: Yes  Technique: Retrograde massage  Tissue Mobilized: Scar/adhesion   Therapeutic Exercise:                                                                               : The patient's home exercise program was reviewed.                                                 Date:  3/20/19 Date:  3/6/19 Date:  3/11/19 Date:  3/13/19 Date:  3/18/19   Activity/Exercise Parameters Parameters Parameters Parameters Parameters   AROM during Fluidotherapy 20 min 20 min 20 min 20 min 20 min   Paraffin with Stretch 15 min   15 min 15 min 15 min 15 min   Retrograde massage, Friction Scar massage, Joint Mobilization 15 min   15 min 15 min   15 min 15 min   Scarf Curl 5 min   5 min 5 min 5 min 5 min   Washer Game 5 min 5 min 5 min 5 min 5 min   Individual Gripper 20 reps     15 reps 20 reps   Hand Bailey 20 reps     15 reps 20 reps   Blocking boards   5 min 5 min 5 min 2 min 5 min   Pegs          Clothes Pins   20 reps   15 reps 20 reps   A-R Bar          Exerstick   40 reps   40 reps 40 reps   Velcro-Roll          AROM EX 12 min 10 min 10 min 5 min 2 min   RESISTIVE EXERCISES Weight/ Sets/Reps   Weight/ Sets/Reps Weight/ Sets/Reps Weight/ Sets/Reps Weight/ Sets/Reps   WEIGHT WELL        Sup/Pro        UD/RD        Wrist Flex/Ext        Free Weights          UBE(Minutes)          Nautilus        Compound Row        Vertical Chest        Overhead Press                    HEP: As above; handouts given to patient for all exercises. Therapeutic Modalities:         Left Wrist Heat  Type: Paraffin bath(with a finger flexion stretch)  Duration: 15 minutes  Patient Position: Sitting                                     Joint Mobilization:        Treatment Times:  · Therapeutic Exercise: 30 minutes  · Manual Therapy: 15 minutes  · Parafin:15  minutes  · Whirlpool:  minutes  · Other:  minutes       Treatment/Session Assessment:    · Response to Treatment:  Patients tolerated treatment well with no complications. Upon completion of treatment, skin condition was normal..  · Compliance with Program/Exercises: Will assess as treatment progresses. · Recommendations/Intent for next treatment session: \"Next visit will focus on advancements to more challenging activities. .Will continue strengthening   per MD.  Total Treatment Duration:  OT Patient Time In/Time Out  Time In: 0915  Time Out: [address], OT

## 2019-03-25 ENCOUNTER — HOSPITAL ENCOUNTER (OUTPATIENT)
Dept: PHYSICAL THERAPY | Age: 44
Discharge: HOME | End: 2019-03-25
Payer: COMMERCIAL

## 2019-03-25 PROCEDURE — 97140 MANUAL THERAPY 1/> REGIONS: CPT

## 2019-03-25 PROCEDURE — 97110 THERAPEUTIC EXERCISES: CPT

## 2019-03-25 PROCEDURE — 97018 PARAFFIN BATH THERAPY: CPT

## 2019-03-25 NOTE — PROGRESS NOTES
Theresa Gamboa  : 1975  Primary: Darin Alcaraz [facility]*  Secondary:  [facility] at [place] [address] [facility], [address], [address] [facility]  [phone]   [phone]          OUTPATIENT OCCUPATIONAL THERAPY: Daily Note 3/25/2019     ICD-10: Treatment Diagnosis: Stiffness of left hand, not elsewhere classified (M25.642)Pain in left hand (K72.257)  Precautions/Allergies:   Patient has no known allergies. MD Orders: Evaluate and treat: custom molded TMA splint, ROM MEDICAL/REFERRING DIAGNOSIS:   Unilateral primary osteoarthritis of first carpometacarpal joint, left hand [M18.12]   DATE OF ONSET: several years ago  DATE OF SURGERY: 2019   REFERRING PHYSICIAN: Donal Guillory MD  RETURN PHYSICIAN APPOINTMENT: 4 weeks     INITIAL ASSESSMENT:  Ms. Bijan Rg presents with decreased functional use, strength and range of motion of her left hand and upper extremity that is affecting her independence with activities of daily living and ability to perform job tasks. I feel that Ms. Bijan Rg will benefit from skilled occupational therapy to maximize the functional use of her hand and upper extremity in daily activities . PLAN OF CARE:   PROBLEM LIST:  1. Pain in left hand. 2. Decreased motion in left hand. 3. Decreased strength in left hand. INTERVENTIONS PLANNED:  1. Modalities that may include fluidotherapy, paraffin, ultrasound, and light therapy. 2. Therapeutic exercise including a home exercise program.  3. Manual therapy. 4. Therapeutic activities. TREATMENT PLAN:  Effective Dates: 2019 TO 2019 (90 days). Frequency/Duration: 2 times a week for 90 Days  GOALS: (Goals have been discussed and agreed upon with patient.)  Short-Term Functional Goals: Time Frame: 4 weeks  1. Decrease pain to 5 to allow patient to perform self care tasks.   2. Increase motion in left hand by 20 degrees to improve functional use of upper extremity in ADL activities. 3. Increase strength in left hand by 10 pounds to allow patient to  and lift objects during self care activities. Discharge Goals: Time Frame: 12 weeks  1. Decrease pain to 2 to allow patient to perform all household  tasks. 2. Increase motion in left hand by 30 degreees to allow patient to perform all ADL activities. 3. Increase strength in left hand by 20 pounds to allow patient to , lift, hold, and carry heavy objects. Rehabilitation Potential For Stated Goals: Good  Regarding New Theron therapy, I certify that the treatment plan above will be carried out by a therapist or under their direction. Thank you for this referral,  Quentin Leong OT       Referring Physician Signature: Amanda Gongora MD _________________________  Date _________            The information in this section was collected on 2019 (except where otherwise noted). OCCUPATIONAL PROFILE & HISTORY:   History of Present Injury/Illness (Reason for Referral): The patient has had increasing arthritis pain in her left thumb for several years. Past Medical History/Comorbidities:   Ms. Yoav Lott  has a past medical history of Allergic rhinitis, Anxiety, Arthritis, Chicken pox, and Pneumonia. Ms. Yoav Lott  has a past surgical history that includes radha biopsy breast stereotactic (); hx  section (); hx breast biopsy (Right); hx breast augmentation; implant breast silicone/eq; and hx orthopaedic (Left). Social History/Living Environment:      Prior Level of Function/Work/Activity:  independent  Dominant Side:         RIGHT     Ambulatory/Rehab Services H2 Model Falls Risk Assessment    Risk Factors:       No Risk Factors Identified Ability to Rise from Chair:       (0)  Ability to rise in a single movement    Falls Prevention Plan:       No modifications necessary   Total: (5 or greater = High Risk): 0     [employer]. All Rights Reserved.  [facility] Patent #[id number]. Federal Law prohibits the replication, distribution or use without written permission from [facility]     Current Medications:    Current Outpatient Medications:     HYDROmorphone (DILAUDID) 2 mg tablet, Take 1 Tab by mouth every three (3) hours as needed for Pain. Max Daily Amount: 16 mg., Disp: 30 Tab, Rfl: 0    CETIRIZINE HCL (ZYRTEC PO), Take 10 mg by mouth Daily (before breakfast). , Disp: , Rfl:    Date Last Reviewed:  3/25/2019   Number of medical conditions (excluding presenting problem) that affect the Plan of Care: Brief history (0):  LOW COMPLEXITY   ASSESSMENT OF OCCUPATIONAL PERFORMANCE:   RANGE OF MOTION:     · AROM: Measurements were not taken due to recent surgery. STRENGTH:  Not tested yet. SENSATION:  The patient reports tingling in her left thumb area. Physical Skills Involved:  1. Range of Motion  2. Strength  3. Sensation  4. Pain (acute)  5. Edema Cognitive Skills Affected (resulting in the inability to perform in a timely and safe manner): 1. none Psychosocial Skills Affected:  1. none   Number of elements that affect the Plan of Care: 5+:  HIGH COMPLEXITY   CLINICAL DECISION MAKING:   Outcome Measure: Tool Used: Disabilities of the Arm, Shoulder and Hand (DASH) Questionnaire - Quick Version  Score:  Initial: 47/55  Most Recent: X/55 (Date: -- )   Interpretation of Score: The DASH is designed to measure the activities of daily living in person's with upper extremity dysfunction or pain. Each section is scored on a 1-5 scale, 5 representing the greatest disability. The scores of each section are added together for a total score of 55. Score 11 12-19 20-28 29-37 38-45 46-54 55   Modifier CH CI CJ CK CL CM CN     Medical Necessity:   · The patient is expected to increase motion, strength and function in her left hand improving her ability to perform ADL, and household activities.   Reason for Services/Other Comments:  · The patient has limited motion, strength and function in her left hand. .  Clinical Decision-Making Assessment:     Clinical Decision-Making: LOW COMPLEXITY   TREATMENT:   (In addition to Assessment/Re-Assessment sessions the following treatments were rendered)  Pre-treatment Symptoms/Complaints:  Pain and stiffness in left hand and wrist.  Pain: Initial:   Pain Intensity 1: 3  Pain Location 1: Hand, Wrist  Pain Orientation 1: Left  Post Session:  1     The patient was provided with a fabricated protective splint for her left thumb and wrist. ( 15 minutes ) 2/11/2019  Patient stated \"My hand is stiff and sore this morning. Lavada Saucer \"    Manual Therapy: (Soft Tissue Mobilization Duration  Duration: 15 Minutes  Duration: 15 Minutes): Technique: Retrograde massage  Tissue Mobilized: Scar/adhesion  LUE Soft Tissue Mobilization: Yes  Technique: Retrograde massage  Tissue Mobilized: Scar/adhesion   Therapeutic Exercise:                                                                               : The patient's home exercise program was reviewed.                                                 Date:  3/20/19 Date:  3/25/19 Date:  3/11/19 Date:  3/13/19 Date:  3/18/19   Activity/Exercise Parameters Parameters Parameters Parameters Parameters   AROM during Fluidotherapy 20 min 20 min 20 min 20 min 20 min   Paraffin with Stretch 15 min   15 min 15 min 15 min 15 min   Retrograde massage, Friction Scar massage, Joint Mobilization 15 min   15 min 15 min   15 min 15 min   Scarf Curl 5 min   5 min 5 min 5 min 5 min   Washer Game 5 min 5 min 5 min 5 min 5 min   Individual Gripper 20 reps   20 reps  15 reps 20 reps   Hand [place] 20 reps   20 reps  15 reps 20 reps   Blocking boards   5 min 5 min 5 min 2 min 5 min   Pegs          Clothes Pins   20 reps 20 reps  15 reps 20 reps   A-R Bar          Exerstick   40 reps 40 reps  40 reps 40 reps   Velcro-Roll          AROM EX 2 min 2 min 10 min 5 min 2 min   RESISTIVE EXERCISES Weight/ Sets/Reps   Weight/ Sets/Reps Weight/ Sets/Reps Weight/ Sets/Reps Weight/ Sets/Reps   WEIGHT WELL        Sup/Pro        UD/RD        Wrist Flex/Ext        Free Weights          UBE(Minutes)          Nautilus        Compound Row        Vertical Chest        Overhead Press                    HEP: As above; handouts given to patient for all exercises. Therapeutic Modalities:         Left Wrist Heat  Type: Paraffin bath(with a finger flexion stretch)  Duration: 15 minutes  Patient Position: Sitting                                     Joint Mobilization:        Treatment Times:  · Therapeutic Exercise: 30 minutes  · Manual Therapy: 15 minutes  · Parafin:15  minutes  · Whirlpool:  minutes  · Other:  minutes       Treatment/Session Assessment:    · Response to Treatment:  Patients tolerated treatment well with no complications. Upon completion of treatment, skin condition was normal..  · Compliance with Program/Exercises: Will assess as treatment progresses. · Recommendations/Intent for next treatment session: \"Next visit will focus on advancements to more challenging activities. .Will continue strengthening   per MD.  Total Treatment Duration:  OT Patient Time In/Time Out  Time In: 1000  Time Out: [address]

## 2019-03-27 ENCOUNTER — HOSPITAL ENCOUNTER (OUTPATIENT)
Dept: PHYSICAL THERAPY | Age: 44
Discharge: HOME | End: 2019-03-27
Payer: COMMERCIAL

## 2019-03-27 PROCEDURE — 97018 PARAFFIN BATH THERAPY: CPT

## 2019-03-27 PROCEDURE — 97110 THERAPEUTIC EXERCISES: CPT

## 2019-03-27 PROCEDURE — 97140 MANUAL THERAPY 1/> REGIONS: CPT

## 2019-03-31 NOTE — PROGRESS NOTES
Abiola Mo  : 1975  Primary: Haris Torres Of Umesh Mehta*  Secondary:  [facility]rapy Center at [facility] 52, [address] [address],[address], [facility] 91.  [phone]   [phone]          OUTPATIENT OCCUPATIONAL THERAPY: Daily Note 3/27/2019     ICD-10: Treatment Diagnosis: Stiffness of left hand, not elsewhere classified (M25.642)Pain in left hand (E49.014)  Precautions/Allergies:   Patient has no known allergies. MD Orders: Evaluate and treat: custom molded TMA splint, ROM MEDICAL/REFERRING DIAGNOSIS:   Unilateral primary osteoarthritis of first carpometacarpal joint, left hand [M18.12]   DATE OF ONSET: several years ago  DATE OF SURGERY: 2019   REFERRING PHYSICIAN: Teri Norman MD  RETURN PHYSICIAN APPOINTMENT: 4 weeks     INITIAL ASSESSMENT:  Ms. Alin Randolph presents with decreased functional use, strength and range of motion of her left hand and upper extremity that is affecting her independence with activities of daily living and ability to perform job tasks. I feel that Ms. Alin Randolph will benefit from skilled occupational therapy to maximize the functional use of her hand and upper extremity in daily activities . PLAN OF CARE:   PROBLEM LIST:  1. Pain in left hand. 2. Decreased motion in left hand. 3. Decreased strength in left hand. INTERVENTIONS PLANNED:  1. Modalities that may include fluidotherapy, paraffin, ultrasound, and light therapy. 2. Therapeutic exercise including a home exercise program.  3. Manual therapy. 4. Therapeutic activities. TREATMENT PLAN:  Effective Dates: 2019 TO 2019 (90 days). Frequency/Duration: 2 times a week for 90 Days  GOALS: (Goals have been discussed and agreed upon with patient.)  Short-Term Functional Goals: Time Frame: 4 weeks  1. Decrease pain to 5 to allow patient to perform self care tasks.   2. Increase motion in left hand by 20 degrees to improve functional use of upper extremity in ADL activities. 3. Increase strength in left hand by 10 pounds to allow patient to  and lift objects during self care activities. Discharge Goals: Time Frame: 12 weeks  1. Decrease pain to 2 to allow patient to perform all household  tasks. 2. Increase motion in left hand by 30 degreees to allow patient to perform all ADL activities. 3. Increase strength in left hand by 20 pounds to allow patient to , lift, hold, and carry heavy objects. Rehabilitation Potential For Stated Goals: Good  Regarding [place] therapy, I certify that the treatment plan above will be carried out by a therapist or under their direction. Thank you for this referral,  Emiliana Rios OT       Referring Physician Signature: Anamaria Kern MD _________________________  Date _________            The information in this section was collected on 2019 (except where otherwise noted). OCCUPATIONAL PROFILE & HISTORY:   History of Present Injury/Illness (Reason for Referral): The patient has had increasing arthritis pain in her left thumb for several years. Past Medical History/Comorbidities:   Ms. Mayuri Clark  has a past medical history of Allergic rhinitis, Anxiety, Arthritis, Chicken pox, and Pneumonia. Ms. Mayuri Clark  has a past surgical history that includes radha biopsy breast stereotactic (); hx  section (); hx breast biopsy (Right); hx breast augmentation; implant breast silicone/eq; and hx orthopaedic (Left). Social History/Living Environment:      Prior Level of Function/Work/Activity:  independent  Dominant Side:         RIGHT     Ambulatory/Rehab Services H2 Model Falls Risk Assessment    Risk Factors:       No Risk Factors Identified Ability to Rise from Chair:       (0)  Ability to rise in a single movement    Falls Prevention Plan:       No modifications necessary   Total: (5 or greater = High Risk): 0     [email or web address]. All Rights Reserved.  Lisa States Patent #[id number]. Federal Law prohibits the replication, distribution or use without written permission from [facility] [employer] [facility]     Current Medications:    Current Outpatient Medications:     HYDROmorphone (DILAUDID) 2 mg tablet, Take 1 Tab by mouth every three (3) hours as needed for Pain. Max Daily Amount: 16 mg., Disp: 30 Tab, Rfl: 0    CETIRIZINE HCL (ZYRTEC PO), Take 10 mg by mouth Daily (before breakfast). , Disp: , Rfl:    Date Last Reviewed:  3/25/2019   Number of medical conditions (excluding presenting problem) that affect the Plan of Care: Brief history (0):  LOW COMPLEXITY   ASSESSMENT OF OCCUPATIONAL PERFORMANCE:   RANGE OF MOTION:     · AROM: Measurements were not taken due to recent surgery. STRENGTH:  Not tested yet. SENSATION:  The patient reports tingling in her left thumb area. Physical Skills Involved:  1. Range of Motion  2. Strength  3. Sensation  4. Pain (acute)  5. Edema Cognitive Skills Affected (resulting in the inability to perform in a timely and safe manner): 1. none Psychosocial Skills Affected:  1. none   Number of elements that affect the Plan of Care: 5+:  HIGH COMPLEXITY   CLINICAL DECISION MAKING:   Outcome Measure: Tool Used: Disabilities of the Arm, Shoulder and Hand (DASH) Questionnaire - Quick Version  Score:  Initial: 47/55  Most Recent: X/55 (Date: -- )   Interpretation of Score: The DASH is designed to measure the activities of daily living in person's with upper extremity dysfunction or pain. Each section is scored on a 1-5 scale, 5 representing the greatest disability. The scores of each section are added together for a total score of 55. Score 11 12-19 20-28 29-37 38-45 46-54 55   Modifier CH CI CJ CK CL CM CN     Medical Necessity:   · The patient is expected to increase motion, strength and function in her left hand improving her ability to perform ADL, and household activities.   Reason for Services/Other Comments:  · The patient has limited motion, strength and function in her left hand. .  Clinical Decision-Making Assessment:     Clinical Decision-Making: LOW COMPLEXITY   TREATMENT:   (In addition to Assessment/Re-Assessment sessions the following treatments were rendered)  Pre-treatment Symptoms/Complaints:  Pain and stiffness in left hand and wrist.  Pain: Initial:   Pain Intensity 1: 3  Pain Location 1: Hand, Wrist  Pain Orientation 1: Left  Post Session:  1     The patient was provided with a fabricated protective splint for her left thumb and wrist. ( 15 minutes ) 2/11/2019  Patient stated \"My hand is stiff and sore this morning. Morna Rout \"    Manual Therapy: (Soft Tissue Mobilization Duration  Duration: 15 Minutes  Duration: 15 Minutes): Technique: Retrograde massage  Tissue Mobilized: Scar/adhesion  LUE Soft Tissue Mobilization: Yes  Technique: Retrograde massage  Tissue Mobilized: Scar/adhesion   Therapeutic Exercise:                                                                               : The patient's home exercise program was reviewed.                                                 Date:  3/20/19 Date:  3/25/19 Date:  3/27/19 Date:  3/13/19 Date:  3/18/19   Activity/Exercise Parameters Parameters Parameters Parameters Parameters   AROM during Fluidotherapy 20 min 20 min 20 min 20 min 20 min   Paraffin with Stretch 15 min   15 min 15 min 15 min 15 min   Retrograde massage, Friction Scar massage, Joint Mobilization 15 min   15 min 15 min   15 min 15 min   Scarf Curl 5 min   5 min 5 min 5 min 5 min   Washer Game 5 min 5 min 5 min 5 min 5 min   Individual Gripper 20 reps   20 reps 20 reps 15 reps 20 reps   Hand [place] 20 reps   20 reps 20 reps 15 reps 20 reps   Blocking boards   5 min 5 min 5 min 2 min 5 min   Pegs          Clothes Pins   20 reps 20 reps 20 reps 15 reps 20 reps   A-R Bar          Exerstick   40 reps 40 reps 40 reps 40 reps 40 reps   Velcro-Roll          AROM EX 2 min 2 min 2 min 5 min 2 min RESISTIVE EXERCISES Weight/ Sets/Reps   Weight/ Sets/Reps Weight/ Sets/Reps Weight/ Sets/Reps Weight/ Sets/Reps   WEIGHT WELL        Sup/Pro        UD/RD        Wrist Flex/Ext        Free Weights          UBE(Minutes)          Nautilus        Compound Row        Vertical Chest        Overhead Press                    HEP: As above; handouts given to patient for all exercises. Therapeutic Modalities:         Left Wrist Heat  Type: Paraffin bath  Duration: 15 minutes  Patient Position: Sitting                                     Joint Mobilization:        Treatment Times:  · Therapeutic Exercise: 30 minutes  · Manual Therapy: 15 minutes  · Parafin:15  minutes  · Whirlpool:  minutes  · Other:  minutes       Treatment/Session Assessment:    · Response to Treatment:  Patients tolerated treatment well with no complications. Upon completion of treatment, skin condition was normal..  · Compliance with Program/Exercises: Will assess as treatment progresses. · Recommendations/Intent for next treatment session: \"Next visit will focus on advancements to more challenging activities. .Will continue strengthening   per MD.  Total Treatment Duration:  OT Patient Time In/Time Out  Time In: 0800  Time Out: 0900    Allayne Apley, OT

## 2019-04-01 ENCOUNTER — HOSPITAL ENCOUNTER (OUTPATIENT)
Dept: PHYSICAL THERAPY | Age: 44
Discharge: HOME | End: 2019-04-01
Payer: COMMERCIAL

## 2019-04-01 PROCEDURE — 97140 MANUAL THERAPY 1/> REGIONS: CPT

## 2019-04-01 PROCEDURE — 97018 PARAFFIN BATH THERAPY: CPT

## 2019-04-01 PROCEDURE — 97110 THERAPEUTIC EXERCISES: CPT

## 2019-04-01 NOTE — PROGRESS NOTES
Harsh Age  : 1975  Primary: Reginaamber Gamino Of Umesh Mehta*  Secondary:  [facility] at [place] [address], [place] [address].  [phone]   [phone]        OUTPATIENT OCCUPATIONAL THERAPY: Daily Treatment Note 2019  Pre-treatment Symptoms/Complaints:  Pain and stiffness in left hand  Pain: Initial: Pain Intensity 1: 2  Pain Location 1: Hand, Wrist  Pain Orientation 1: Left  Post Session:  1/10   Medications Last Reviewed:  2019   Updated Objective Findings:  None Today   TREATMENT:       Manual Therapy: (Soft Tissue Mobilization Duration  Duration: 15 Minutes  Duration: 15 Minutes): Technique: Retrograde massage  Tissue Mobilized: Scar/adhesion  LUE Soft Tissue Mobilization: Yes  Technique: Retrograde massage   Therapeutic Exercise:                                                                               : The patient's home exercise program was reviewed. Date:  19 Date: Date: Date: Date:   Activity/Exercise Parameters Parameters Parameters Parameters Parameters   AROM during Fluidotherapy 20 min       Paraffin with Stretch   15 min       Retrograde massage, Friction Scar massage, Joint Mobilization   15 min       Scarf Curl   5 min       Washer Game 5 min       Individual Gripper 20 reps         Hand [place]   20 reps       Cones          Pegs          Clothes Pins   20 reps       A-R Bar          Exerstick 40 reps         Velcro-Roll                  RESISTIVE EXERCISES Weight/ Sets/Reps   Weight/ Sets/Reps Weight/ Sets/Reps Weight/ Sets/Reps Weight/ Sets/Reps   WEIGHT WELL        Sup/Pro        UD/RD        Wrist Flex/Ext        Free Weights          UBE(Minutes)          Nautilus        Compound Row        Vertical Chest        Overhead Press                    HEP: As above; handouts given to patient for all exercises.     Therapeutic Modalities:         Left Wrist Heat  Type: Paraffin bath  Duration: 15 minutes  Patient Position: Sitting                                     Joint Mobilization:        Treatment Times:  · Therapeutic Exercise: 30 minutes  · Manual Therapy: 15 minutes  · Parafin: 15 minutes  · Whirlpool:  minutes  · Other:  minutes    Treatment/Session Summary:    · Response to Treatment:  Patients tolerated treatment Well with no medical complications. Upon completion of treatment, skin condition was normal.  · Communication/Consultation:  None today  · Equipment provided today:  None today  · Recommendations/Intent for next treatment session: Next visit will focus on increasing range of motion and strengthening. .  Treatment Plan of Care Effective Dates:  2/11/2019 to 5/13/2019  Total Treatment Billable Duration:  60 minutes  OT Patient Time In/Time Out  Time In: 0100  Time Out: 0200  [facility],     Future Appointments   Date Time Provider Daisha Vazquez   4/3/2019  9:00 AM YASMIN RAMSEY  ROOM 1 EDGARDO HOFFMAN   4/3/2019  9:30 AM ERIK Damon

## 2019-04-03 ENCOUNTER — HOSPITAL ENCOUNTER (OUTPATIENT)
Dept: MAMMOGRAPHY | Age: 44
Discharge: HOME | End: 2019-04-03
Attending: FAMILY MEDICINE
Payer: COMMERCIAL

## 2019-04-03 ENCOUNTER — HOSPITAL ENCOUNTER (OUTPATIENT)
Dept: PHYSICAL THERAPY | Age: 44
Discharge: HOME | End: 2019-04-03
Payer: COMMERCIAL

## 2019-04-03 DIAGNOSIS — Z12.31 SCREENING MAMMOGRAM, ENCOUNTER FOR: ICD-10-CM

## 2019-04-03 PROCEDURE — 97110 THERAPEUTIC EXERCISES: CPT

## 2019-04-03 PROCEDURE — 97018 PARAFFIN BATH THERAPY: CPT

## 2019-04-03 PROCEDURE — 77063 BREAST TOMOSYNTHESIS BI: CPT

## 2019-04-03 PROCEDURE — 97140 MANUAL THERAPY 1/> REGIONS: CPT

## 2019-04-03 NOTE — PROGRESS NOTES
Fransisco Aguilar  : 1975  Primary: Inez Eaton Of Umesh Hancockdalia*  Secondary:  [facility]rapy Center at [facility] 52, [address] [facility],[address], [facility] 91.  [phone]   [phone]        OUTPATIENT OCCUPATIONAL THERAPY: Daily Treatment Note 4/3/2019  Pre-treatment Symptoms/Complaints:  Pain and stiffness in left hand  Pain: Initial: Pain Intensity 1: 2  Pain Location 1: Hand, Wrist  Pain Orientation 1: Left  Post Session:  1/10   Medications Last Reviewed:  4/3/2019   Updated Objective Findings:  None Today   TREATMENT:       Manual Therapy: (Soft Tissue Mobilization Duration  Duration: 15 Minutes  Duration: 15 Minutes): Technique: Retrograde massage  Tissue Mobilized: Scar/adhesion  LUE Soft Tissue Mobilization: Yes  Technique: Retrograde massage  Tissue Mobilized: Scar/adhesion   Therapeutic Exercise:                                                                               : The patient's home exercise program was reviewed.                                                 Date:  19 Date:  4/3/19 Date: Date: Date:   Activity/Exercise Parameters Parameters Parameters Parameters Parameters   AROM during Fluidotherapy 20 min 20 min      Paraffin with Stretch   15 min 15 min      Retrograde massage, Friction Scar massage, Joint Mobilization   15 min 15 min      Scarf Curl   5 min 5 min      Washer Game 5 min 5 min      Individual Gripper 20 reps   20 reps      Hand [place]   20 reps 20 reps      Cones          Pegs          Clothes Pins   20 reps 20 reps      A-R Bar          Exerstick 40 reps   40 reps      Velcro-Roll                  RESISTIVE EXERCISES Weight/ Sets/Reps   Weight/ Sets/Reps Weight/ Sets/Reps Weight/ Sets/Reps Weight/ Sets/Reps   WEIGHT WELL        Sup/Pro        UD/RD        Wrist Flex/Ext        Free Weights          UBE(Minutes)          Nautilus        Compound Row        Vertical Chest        Overhead Press                    HEP: As above; handouts given to patient for all exercises. Therapeutic Modalities:         Left Wrist Heat  Type: Paraffin bath(with a finger flexion stretch)  Duration: 15 minutes  Patient Position: Sitting                                     Joint Mobilization:        Treatment Times:  · Therapeutic Exercise: 30 minutes  · Manual Therapy: 15 minutes  · Parafin: 15 minutes  · Whirlpool:  minutes  · Other:  minutes    Treatment/Session Summary:    · Response to Treatment:  Patients tolerated treatment Well with no medical complications. Upon completion of treatment, skin condition was normal.  · Communication/Consultation:  None today  · Equipment provided today:  None today  · Recommendations/Intent for next treatment session: Next visit will focus on increasing range of motion and strengthening. .  Treatment Plan of Care Effective Dates:  2/11/2019 to 5/13/2019  Total Treatment Billable Duration:  60 minutes  OT Patient Time In/Time Out  Time In: 0800  Time Out: 0900  Evelyn Navarrete OT    Future Appointments   Date Time Provider Daisha Vazquez   4/11/2019  1:00 PM Janell Wise OT [facility]

## 2019-04-11 ENCOUNTER — HOSPITAL ENCOUNTER (OUTPATIENT)
Dept: PHYSICAL THERAPY | Age: 44
Discharge: HOME | End: 2019-04-11
Payer: COMMERCIAL

## 2019-04-11 PROCEDURE — 97110 THERAPEUTIC EXERCISES: CPT

## 2019-04-11 PROCEDURE — 97140 MANUAL THERAPY 1/> REGIONS: CPT

## 2019-04-11 PROCEDURE — 97018 PARAFFIN BATH THERAPY: CPT

## 2019-04-11 NOTE — PROGRESS NOTES
Fransisco Aguilar  : 1975  Primary: Inez Meñowilda Of Tahiralayla Karissadalia*  Secondary:  Therapy Center at [address] [place] [address] 52, [address] [address],[address], [place] [facility] 91.  [phone]   [phone]        OUTPATIENT OCCUPATIONAL THERAPY: Daily Treatment Note 2019  Pre-treatment Symptoms/Complaints:  Pain and stiffness in left hand  Pain: Initial: Pain Intensity 1: 2  Pain Location 1: Hand, Wrist  Pain Orientation 1: Left  Post Session:  1/10   Medications Last Reviewed:  2019   Updated Objective Findings:  None Today   TREATMENT:       Manual Therapy: (Soft Tissue Mobilization Duration  Duration: 15 Minutes  Duration: 15 Minutes): Technique: Retrograde massage  Tissue Mobilized: Scar/adhesion  LUE Soft Tissue Mobilization: Yes  Technique: Retrograde massage  Tissue Mobilized: Scar/adhesion   Therapeutic Exercise:                                                                               : The patient's home exercise program was reviewed.                                                 Date:  19 Date:  4/3/19 Date:  19 Date: Date:   Activity/Exercise Parameters Parameters Parameters Parameters Parameters   AROM during Fluidotherapy 20 min 20 min 20 min     Paraffin with Stretch   15 min 15 min 15 min     Retrograde massage, Friction Scar massage, Joint Mobilization   15 min 15 min 15 min     Scarf Curl   5 min 5 min 5 min     Washer Game 5 min 5 min 5 min     Individual Gripper 20 reps   20 reps 20 reps     Hand [place]   20 reps 20 reps 20 reps     Cones          Pegs          Clothes Pins   20 reps 20 reps 20 reps     A-R Bar          Exerstick 40 reps   40 reps 40 reps     Velcro-Roll                  RESISTIVE EXERCISES Weight/ Sets/Reps   Weight/ Sets/Reps Weight/ Sets/Reps Weight/ Sets/Reps Weight/ Sets/Reps   WEIGHT WELL        Sup/Pro        UD/RD        Wrist Flex/Ext        Free Weights          UBE(Minutes)          Nautilus        Compound Row Vertical Chest        Overhead Press                    HEP: As above; handouts given to patient for all exercises. Therapeutic Modalities:         Left Wrist Heat  Type: Paraffin bath(with a thumb flexion stretch)  Duration: 15 minutes  Patient Position: Sitting                                     Joint Mobilization:        Treatment Times:  · Therapeutic Exercise: 30 minutes  · Manual Therapy: 15 minutes  · Parafin: 15 minutes  · Whirlpool:  minutes  · Other:  minutes    Treatment/Session Summary:    · Response to Treatment:  Patients tolerated treatment Well with no medical complications. Upon completion of treatment, skin condition was normal.  · Communication/Consultation:  None today  · Equipment provided today:  None today  · Recommendations/Intent for next treatment session: Next visit will focus on increasing range of motion and strengthening. .  Treatment Plan of Care Effective Dates:  2/11/2019 to 5/13/2019  Total Treatment Billable Duration:  60 minutes  OT Patient Time In/Time Out  Time In: 0130  Time Out: 0230  Rekha Wise OT    Future Appointments   Date Time Provider Daisha Vazquez   4/17/2019  8:15 AM David Mcdaniel OT [facility]

## 2019-04-17 ENCOUNTER — HOSPITAL ENCOUNTER (OUTPATIENT)
Dept: PHYSICAL THERAPY | Age: 44
Discharge: HOME | End: 2019-04-17
Payer: COMMERCIAL

## 2019-04-17 PROCEDURE — 97110 THERAPEUTIC EXERCISES: CPT

## 2019-04-17 PROCEDURE — 97140 MANUAL THERAPY 1/> REGIONS: CPT

## 2019-04-17 PROCEDURE — 97018 PARAFFIN BATH THERAPY: CPT

## 2019-04-18 NOTE — PROGRESS NOTES
Roz Tsai  : 1975  Primary: Cely Grew Of Umesh Mehta*  Secondary:  Therapy Center at [facility] 52, [address] [address],[address], [address] [facility]  [phone]   [phone]        OUTPATIENT OCCUPATIONAL THERAPY: Daily Treatment Note 2019  Pre-treatment Symptoms/Complaints:  Pain and stiffness in left hand  Pain: Initial: Pain Intensity 1: 0  Pain Location 1: Hand, Wrist  Pain Orientation 1: Left  Post Session:  0/10   Medications Last Reviewed:  2019  Updated Objective Findings:  None Today   TREATMENT:       Manual Therapy: (Soft Tissue Mobilization Duration  Duration: 15 Minutes  Duration: 15 Minutes): Technique: Retrograde massage  Tissue Mobilized: Scar/adhesion  LUE Soft Tissue Mobilization: Yes  Tissue Mobilized: Scar/adhesion   Therapeutic Exercise:                                                                               : The patient's home exercise program was reviewed.                                                 Date:  19 Date:  4/3/19 Date:  19 Date:  19 Date:   Activity/Exercise Parameters Parameters Parameters Parameters Parameters   AROM during Fluidotherapy 20 min 20 min 20 min 20 min    Paraffin with Stretch   15 min 15 min 15 min 15 min    Retrograde massage, Friction Scar massage, Joint Mobilization   15 min 15 min 15 min 15 min    Scarf Curl   5 min 5 min 5 min 5 min    Washer Game 5 min 5 min 5 min 2 min    Individual Gripper 20 reps   20 reps 20 reps 20 reps    Hand [place]   20 reps 20 reps 20 reps 20 reps    Cones          Pegs          Clothes Pins   20 reps 20 reps 20 reps 20 reps    A-R Bar          Exerstick 40 reps   40 reps 40 reps 40 reps    Velcro-Roll                  RESISTIVE EXERCISES Weight/ Sets/Reps   Weight/ Sets/Reps Weight/ Sets/Reps Weight/ Sets/Reps Weight/ Sets/Reps   WEIGHT WELL        Sup/Pro        UD/RD        Wrist Flex/Ext        Free Weights          UBE(Minutes) [place]        Compound Row        Vertical Chest        [employer]                    HEP: As above; handouts given to patient for all exercises. Therapeutic Modalities:         Left Wrist Heat  Type: Paraffin bath(with a thumb flexion stretch)  Duration: 15 minutes  Patient Position: Sitting                                     Joint Mobilization:        Treatment Times:  · Therapeutic Exercise: 30 minutes  · Manual Therapy: 15 minutes  · Parafin: 15 minutes  · Whirlpool:  minutes  · Other:  minutes    Treatment/Session Summary:    · Response to Treatment:  Patients tolerated treatment Well with no medical complications. Upon completion of treatment, skin condition was normal.  · Communication/Consultation:  None today  · Equipment provided today:  None today  · Recommendations/Intent for next treatment session: To discharge at this time all goals met. Treatment Plan of Care Effective Dates:  2/11/2019 to 5/13/2019  Total Treatment Billable Duration:  60 minutes  OT Patient Time In/Time Out  Time In: 0845  Time Out: 0945  Nida Qureshi OT    No future appointments.

## 2019-04-18 NOTE — THERAPY DISCHARGE
Andrés Delaneyne  : 1975  Primary: Jerrica Yung Of [facility] Mehta*  Secondary:  [facility] [place] [address] [facility], [address], [place] [address].  [phone]   [phone]          OUTPATIENT OCCUPATIONAL THERAPY: Discharge      ICD-10: Treatment Diagnosis: Stiffness of left hand, not elsewhere classified (M25.642)Pain in left hand (P15.112)  Precautions/Allergies:   Patient has no known allergies. MD Orders: Evaluate and treat: custom molded TMA splint, ROM MEDICAL/REFERRING DIAGNOSIS:   Unilateral primary osteoarthritis of first carpometacarpal joint, left hand [M18.12]   DATE OF ONSET: several years ago  DATE OF SURGERY: 2019   REFERRING PHYSICIAN: Abiola Green MD  RETURN PHYSICIAN APPOINTMENT: 4 weeks     INITIAL ASSESSMENT:  Ms. Mau Kahn presents with decreased functional use, strength and range of motion of her left hand and upper extremity that is affecting her independence with activities of daily living and ability to perform job tasks. I feel that Ms. Mau Kahn will benefit from skilled occupational therapy to maximize the functional use of her hand and upper extremity in daily activities . PLAN OF CARE:   PROBLEM LIST:  1. Pain in left hand. 2. Decreased motion in left hand. 3. Decreased strength in left hand. INTERVENTIONS PLANNED:  1. Modalities that may include fluidotherapy, paraffin, ultrasound, and light therapy. 2. Therapeutic exercise including a home exercise program.  3. Manual therapy. 4. Therapeutic activities. TREATMENT PLAN:  Effective Dates: 2019 TO 2019 (90 days). Frequency/Duration: 2 times a week for 90 Days  GOALS: (Goals have been discussed and agreed upon with patient.)  Short-Term Functional Goals: Time Frame: 4 weeks  1. Decrease pain to 5 to allow patient to perform self care tasks. ( GOAL MET )  2.  Increase motion in left hand by 20 degrees to improve functional use of upper extremity in ADL activities. ( GOAL MET )  3. Increase strength in left hand by 10 pounds to allow patient to  and lift objects during self care activities. ( GOAL MET )  Discharge Goals: Time Frame: 12 weeks  1. Decrease pain to 2 to allow patient to perform all household  Tasks. ( GOAL MET MOST OF THE TIME )  2. Increase motion in left hand by 30 degreees to allow patient to perform all ADL activities. ( GOAL MET )  3. Increase strength in left hand by 20 pounds to allow patient to , lift, hold, and carry heavy objects. ( GOAL MET )  Rehabilitation Potential For Stated Goals: Good  Regarding Bria Cuba's therapy, I certify that the treatment plan above will be carried out by a therapist or under their direction. Thank you for this referral,  Yolis Zepeda OT       Referring Physician Signature: Loulou Phipps MD _________________________  Date _________            The information in this section was collected on 2019 (except where otherwise noted). OCCUPATIONAL PROFILE & HISTORY:   History of Present Injury/Illness (Reason for Referral): The patient has had increasing arthritis pain in her left thumb for several years. Past Medical History/Comorbidities:   Ms. Eloisa Sims  has a past medical history of Allergic rhinitis, Anxiety, Arthritis, Chicken pox, and Pneumonia. Ms. Eloisa Sims  has a past surgical history that includes radha biopsy breast stereotactic (); hx  section (); hx breast biopsy (Right); hx breast augmentation; implant breast silicone/eq; and hx orthopaedic (Left).   Social History/Living Environment:      Prior Level of Function/Work/Activity:  independent  Dominant Side:         RIGHT     Ambulatory/Rehab Services H2 Model Falls Risk Assessment    Risk Factors:       No Risk Factors Identified Ability to Rise from Chair:       (0)  Ability to rise in a single movement    Falls Prevention Plan:       No modifications necessary   Total: (5 or greater = High Risk): 0    ©2010 [facility] of [employer] Patent #[id number]. Federal Law prohibits the replication, distribution or use without written permission from [facility] [employer]     Current Medications:    Current Outpatient Medications:     HYDROmorphone (DILAUDID) 2 mg tablet, Take 1 Tab by mouth every three (3) hours as needed for Pain. Max Daily Amount: 16 mg., Disp: 30 Tab, Rfl: 0    CETIRIZINE HCL (ZYRTEC PO), Take 10 mg by mouth Daily (before breakfast). , Disp: , Rfl:    Date Last Reviewed:  4/17/2019   Number of medical conditions (excluding presenting problem) that affect the Plan of Care: Brief history (0):  LOW COMPLEXITY   ASSESSMENT OF OCCUPATIONAL PERFORMANCE:   RANGE OF MOTION:     · AROM: Left thumb motion is as follows: MP 0/35, IP 60 degrees. Flexion from the tip of her thumb to the base of her little finger is 1.5 Cm. STRENGTH:  STRENGTH: Right 60 lbs. Left 30 lbs. LAT PINCH: Right 19 lbs. Left 8 lbs. SENSATION:  Intact           Physical Skills Involved:  1. Range of Motion  2. Strength  3. Sensation  4. Pain (acute)  5. Edema Cognitive Skills Affected (resulting in the inability to perform in a timely and safe manner): 1. none Psychosocial Skills Affected:  1. none   Number of elements that affect the Plan of Care: 5+:  HIGH COMPLEXITY   CLINICAL DECISION MAKING:   Outcome Measure: Tool Used: Disabilities of the Arm, Shoulder and Hand (DASH) Questionnaire - Quick Version  Score:  Initial: 47/55  Most Recent: X/55 (Date: -- )   Interpretation of Score: The DASH is designed to measure the activities of daily living in person's with upper extremity dysfunction or pain. Each section is scored on a 1-5 scale, 5 representing the greatest disability. The scores of each section are added together for a total score of 55.     Score 11 12-19 20-28 29-37 38-45 46-54 55   Modifier CH CI CJ CK CL CM CN     Medical Necessity:   · The patient is expected to increase motion, strength and function in her left hand improving her ability to perform ADL, and household activities. Reason for Services/Other Comments:  · The patient has limited motion, strength and function in her left hand. .  Clinical Decision-Making Assessment:     Clinical Decision-Making: LOW COMPLEXITY   TREATMENT:   (In addition to Assessment/Re-Assessment sessions the following treatments were rendered)  Pre-treatment Symptoms/Complaints:  Pain and stiffness in left hand and wrist.  Pain: Initial:   Pain Intensity 1: 0  Pain Location 1: Hand, Wrist  Pain Orientation 1: Left  Post Session:  0     Sherren Infield, OT

## 2019-10-01 ENCOUNTER — RX ONLY (OUTPATIENT)
Age: 44
Setting detail: RX ONLY
End: 2019-10-01

## 2019-10-01 ENCOUNTER — APPOINTMENT (RX ONLY)
Dept: URBAN - METROPOLITAN AREA CLINIC 349 | Facility: CLINIC | Age: 44
Setting detail: DERMATOLOGY
End: 2019-10-01

## 2019-10-01 DIAGNOSIS — L82.1 OTHER SEBORRHEIC KERATOSIS: ICD-10-CM

## 2019-10-01 DIAGNOSIS — L57.0 ACTINIC KERATOSIS: ICD-10-CM

## 2019-10-01 DIAGNOSIS — L91.8 OTHER HYPERTROPHIC DISORDERS OF THE SKIN: ICD-10-CM | Status: INADEQUATELY CONTROLLED

## 2019-10-01 DIAGNOSIS — L72.0 EPIDERMAL CYST: ICD-10-CM | Status: INADEQUATELY CONTROLLED

## 2019-10-01 DIAGNOSIS — L82.0 INFLAMED SEBORRHEIC KERATOSIS: ICD-10-CM

## 2019-10-01 DIAGNOSIS — D22 MELANOCYTIC NEVI: ICD-10-CM

## 2019-10-01 DIAGNOSIS — L21.8 OTHER SEBORRHEIC DERMATITIS: ICD-10-CM | Status: STABLE

## 2019-10-01 DIAGNOSIS — Z71.89 OTHER SPECIFIED COUNSELING: ICD-10-CM

## 2019-10-01 PROBLEM — D23.62 OTHER BENIGN NEOPLASM OF SKIN OF LEFT UPPER LIMB, INCLUDING SHOULDER: Status: ACTIVE | Noted: 2019-10-01

## 2019-10-01 PROBLEM — D22.5 MELANOCYTIC NEVI OF TRUNK: Status: ACTIVE | Noted: 2019-10-01

## 2019-10-01 PROCEDURE — 17003 DESTRUCT PREMALG LES 2-14: CPT | Mod: 59

## 2019-10-01 PROCEDURE — ? SKIN TAG REMOVAL

## 2019-10-01 PROCEDURE — 11200 RMVL SKIN TAGS UP TO&INC 15: CPT | Mod: 59

## 2019-10-01 PROCEDURE — ? BENIGN DESTRUCTION

## 2019-10-01 PROCEDURE — 17000 DESTRUCT PREMALG LESION: CPT | Mod: 59

## 2019-10-01 PROCEDURE — ? EDUCATIONAL RESOURCES PROVIDED

## 2019-10-01 PROCEDURE — 99214 OFFICE O/P EST MOD 30 MIN: CPT | Mod: 25

## 2019-10-01 PROCEDURE — ? LIQUID NITROGEN

## 2019-10-01 PROCEDURE — ? TREATMENT REGIMEN

## 2019-10-01 PROCEDURE — ? COUNSELING

## 2019-10-01 PROCEDURE — 17110 DESTRUCTION B9 LES UP TO 14: CPT

## 2019-10-01 RX ORDER — KETOCONAZOLE 20.5 MG/ML
SHAMPOO, SUSPENSION TOPICAL
Qty: 1 | Refills: 11 | Status: CANCELLED
Stop reason: ENTERED-IN-ERROR

## 2019-10-01 RX ORDER — KETOCONAZOLE 20.5 MG/ML
SHAMPOO, SUSPENSION TOPICAL
Qty: 1 | Refills: 11 | Status: ERX

## 2019-10-01 RX ORDER — KETOCONAZOLE 20.5 MG/ML
SHAMPOO, SUSPENSION TOPICAL
Qty: 1 | Refills: 4 | Status: CANCELLED
Stop reason: ENTERED-IN-ERROR

## 2019-10-01 ASSESSMENT — LOCATION DETAILED DESCRIPTION DERM
LOCATION DETAILED: LEFT LATERAL MALAR CHEEK
LOCATION DETAILED: SUPERIOR THORACIC SPINE
LOCATION DETAILED: RIGHT MEDIAL FOREHEAD
LOCATION DETAILED: LEFT CENTRAL FRONTAL SCALP
LOCATION DETAILED: RIGHT INFERIOR POSTAURICULAR SKIN
LOCATION DETAILED: RIGHT LATERAL ABDOMEN
LOCATION DETAILED: LEFT INFERIOR FOREHEAD
LOCATION DETAILED: RIGHT MID PREAURICULAR CHEEK
LOCATION DETAILED: RIGHT FOREHEAD
LOCATION DETAILED: LEFT INFERIOR MEDIAL FOREHEAD
LOCATION DETAILED: RIGHT LATERAL MALAR CHEEK
LOCATION DETAILED: LEFT SUPERIOR MEDIAL FOREHEAD
LOCATION DETAILED: EPIGASTRIC SKIN
LOCATION DETAILED: RIGHT SUPERIOR UPPER BACK
LOCATION DETAILED: RIGHT SUPERIOR CENTRAL MALAR CHEEK
LOCATION DETAILED: RIGHT ANTERIOR AXILLA
LOCATION DETAILED: LEFT SUPERIOR PREAURICULAR CHEEK
LOCATION DETAILED: RIGHT SUPERIOR FOREHEAD
LOCATION DETAILED: INFERIOR THORACIC SPINE
LOCATION DETAILED: RIGHT AXILLARY VAULT
LOCATION DETAILED: LEFT SUPERIOR FOREHEAD
LOCATION DETAILED: LEFT SUPERIOR LATERAL MALAR CHEEK
LOCATION DETAILED: MIDDLE STERNUM
LOCATION DETAILED: RIGHT SUPERIOR LATERAL UPPER BACK
LOCATION DETAILED: RIGHT SUPERIOR MEDIAL UPPER BACK
LOCATION DETAILED: RIGHT SUPERIOR MEDIAL FOREHEAD

## 2019-10-01 ASSESSMENT — LOCATION SIMPLE DESCRIPTION DERM
LOCATION SIMPLE: ABDOMEN
LOCATION SIMPLE: SCALP
LOCATION SIMPLE: RIGHT UPPER BACK
LOCATION SIMPLE: LEFT SCALP
LOCATION SIMPLE: RIGHT BACK
LOCATION SIMPLE: LEFT FOREHEAD
LOCATION SIMPLE: RIGHT AXILLARY VAULT
LOCATION SIMPLE: LEFT CHEEK
LOCATION SIMPLE: RIGHT ANTERIOR AXILLA
LOCATION SIMPLE: CHEST
LOCATION SIMPLE: RIGHT FOREHEAD
LOCATION SIMPLE: UPPER BACK
LOCATION SIMPLE: RIGHT CHEEK

## 2019-10-01 ASSESSMENT — LOCATION ZONE DERM
LOCATION ZONE: SCALP
LOCATION ZONE: FACE
LOCATION ZONE: AXILLAE
LOCATION ZONE: TRUNK

## 2019-10-01 NOTE — PROCEDURE: SKIN TAG REMOVAL
Detail Level: Zone
Add Associated Diagnoses If Applicable When Selecting Medical Necessity: Yes
Anesthesia Volume In Cc: 0
Medical Necessity Clause: This procedure was medically necessary because the lesions that were treated were:
Consent: Written consent obtained and the risks of skin tag removal was reviewed with the patient including but not limited to bleeding, pigmentary change, infection, pain, and remote possibility of scarring.
Medical Necessity Information: It is in your best interest to select a reason for this procedure from the list below. All of these items fulfill various CMS LCD requirements except the new and changing color options.

## 2019-10-01 NOTE — PROCEDURE: LIQUID NITROGEN
Post-Care Instructions: I reviewed with the patient in detail post-care instructions. Patient is to wear sunprotection, and avoid picking at any of the treated lesions. Pt may apply Vaseline to crusted or scabbing areas.
Add 52 Modifier (Optional): no
Detail Level: Zone
Number Of Freeze-Thaw Cycles: 3 freeze-thaw cycles
Duration Of Freeze Thaw-Cycle (Seconds): 2
Consent: The patient's consent was obtained including but not limited to risks of crusting, scabbing, blistering, scarring, darker or lighter pigmentary change, recurrence, incomplete removal and infection.
Detail Level: Detailed
Duration Of Freeze Thaw-Cycle (Seconds): 3
Medical Necessity Information: It is in your best interest to select a reason for this procedure from the list below. All of these items fulfill various CMS LCD requirements except the new and changing color options.
Include Z78.9 (Other Specified Conditions Influencing Health Status) As An Associated Diagnosis?: Yes
Medical Necessity Clause: This procedure was medically necessary because the lesions that were treated were:
Number Of Freeze-Thaw Cycles: 2 freeze-thaw cycles

## 2019-10-01 NOTE — PROCEDURE: BENIGN DESTRUCTION
Post-Care Instructions: I reviewed with the patient in detail post-care instructions. Patient is to wear sunprotection, and avoid picking at any of the treated lesions. Pt may apply Vaseline to crusted or scabbing areas.
Anesthesia Volume In Cc: 0
Add 52 Modifier (Optional): no
Medical Necessity Information: It is in your best interest to select a reason for this procedure from the list below. All of these items fulfill various CMS LCD requirements except the new and changing color options.
Include Z78.9 (Other Specified Conditions Influencing Health Status) As An Associated Diagnosis?: Yes
Medical Necessity Clause: This procedure was medically necessary because the lesions that were treated were:
Detail Level: Detailed
Consent: The patient's consent was obtained including but not limited to risks of crusting, scabbing, blistering, scarring, darker or lighter pigmentary change, recurrence, incomplete removal and infection.

## 2020-10-01 ENCOUNTER — APPOINTMENT (RX ONLY)
Dept: URBAN - METROPOLITAN AREA CLINIC 349 | Facility: CLINIC | Age: 45
Setting detail: DERMATOLOGY
End: 2020-10-01

## 2020-10-01 DIAGNOSIS — L57.0 ACTINIC KERATOSIS: ICD-10-CM

## 2020-10-01 DIAGNOSIS — L21.8 OTHER SEBORRHEIC DERMATITIS: ICD-10-CM

## 2020-10-01 DIAGNOSIS — Z12.83 ENCOUNTER FOR SCREENING FOR MALIGNANT NEOPLASM OF SKIN: ICD-10-CM

## 2020-10-01 DIAGNOSIS — L82.0 INFLAMED SEBORRHEIC KERATOSIS: ICD-10-CM

## 2020-10-01 DIAGNOSIS — L82.1 OTHER SEBORRHEIC KERATOSIS: ICD-10-CM

## 2020-10-01 DIAGNOSIS — D22 MELANOCYTIC NEVI: ICD-10-CM

## 2020-10-01 PROBLEM — D22.5 MELANOCYTIC NEVI OF TRUNK: Status: ACTIVE | Noted: 2020-10-01

## 2020-10-01 PROCEDURE — ? LIQUID NITROGEN

## 2020-10-01 PROCEDURE — 17110 DESTRUCTION B9 LES UP TO 14: CPT

## 2020-10-01 PROCEDURE — 17000 DESTRUCT PREMALG LESION: CPT | Mod: 59

## 2020-10-01 PROCEDURE — 99213 OFFICE O/P EST LOW 20 MIN: CPT | Mod: 25

## 2020-10-01 PROCEDURE — ? PRESCRIPTION

## 2020-10-01 PROCEDURE — ? COUNSELING

## 2020-10-01 PROCEDURE — 17003 DESTRUCT PREMALG LES 2-14: CPT | Mod: 59

## 2020-10-01 PROCEDURE — ? BENIGN DESTRUCTION

## 2020-10-01 PROCEDURE — ? TREATMENT REGIMEN

## 2020-10-01 RX ORDER — KETOCONAZOLE 20 MG/ML
SHAMPOO, SUSPENSION TOPICAL
Qty: 1 | Refills: 11 | Status: ERX

## 2020-10-01 ASSESSMENT — LOCATION SIMPLE DESCRIPTION DERM
LOCATION SIMPLE: GLABELLA
LOCATION SIMPLE: RIGHT ANTERIOR NECK
LOCATION SIMPLE: UPPER BACK
LOCATION SIMPLE: LEFT ANTERIOR NECK
LOCATION SIMPLE: RIGHT EYEBROW
LOCATION SIMPLE: NOSE
LOCATION SIMPLE: RIGHT FOREHEAD
LOCATION SIMPLE: CHEST
LOCATION SIMPLE: LEFT SHOULDER
LOCATION SIMPLE: RIGHT WRIST

## 2020-10-01 ASSESSMENT — LOCATION DETAILED DESCRIPTION DERM
LOCATION DETAILED: SUPERIOR THORACIC SPINE
LOCATION DETAILED: RIGHT MEDIAL SUPERIOR CHEST
LOCATION DETAILED: GLABELLA
LOCATION DETAILED: STERNAL NOTCH
LOCATION DETAILED: UPPER STERNUM
LOCATION DETAILED: RIGHT LATERAL EYEBROW
LOCATION DETAILED: MIDDLE STERNUM
LOCATION DETAILED: RIGHT DORSAL WRIST
LOCATION DETAILED: LEFT ANTERIOR SHOULDER
LOCATION DETAILED: RIGHT SUPERIOR MEDIAL FOREHEAD
LOCATION DETAILED: LEFT MEDIAL SUPERIOR CHEST
LOCATION DETAILED: NASAL ROOT
LOCATION DETAILED: LEFT CLAVICULAR NECK
LOCATION DETAILED: RIGHT INFERIOR ANTERIOR NECK

## 2020-10-01 ASSESSMENT — LOCATION ZONE DERM
LOCATION ZONE: TRUNK
LOCATION ZONE: NECK
LOCATION ZONE: ARM
LOCATION ZONE: NOSE
LOCATION ZONE: FACE

## 2020-10-01 ASSESSMENT — PAIN INTENSITY VAS: HOW INTENSE IS YOUR PAIN 0 BEING NO PAIN, 10 BEING THE MOST SEVERE PAIN POSSIBLE?: 1/10 PAIN

## 2020-10-01 NOTE — PROCEDURE: LIQUID NITROGEN
Detail Level: Detailed
Render Post-Care Instructions In Note?: no
Medical Necessity Information: It is in your best interest to select a reason for this procedure from the list below. All of these items fulfill various CMS LCD requirements except the new and changing color options.
Number Of Freeze-Thaw Cycles: 2 freeze-thaw cycles
Consent: The patient's consent was obtained including but not limited to risks of crusting, scabbing, blistering, scarring, darker or lighter pigmentary change, recurrence, incomplete removal and infection.
Duration Of Freeze Thaw-Cycle (Seconds): 2
Duration Of Freeze Thaw-Cycle (Seconds): 3
Post-Care Instructions: I reviewed with the patient in detail post-care instructions. Patient is to wear sunprotection, and avoid picking at any of the treated lesions. Pt may apply Vaseline to crusted or scabbing areas.
Number Of Freeze-Thaw Cycles: 3 freeze-thaw cycles
Medical Necessity Clause: This procedure was medically necessary because the lesions that were treated were:
Include Z78.9 (Other Specified Conditions Influencing Health Status) As An Associated Diagnosis?: Yes

## 2020-10-01 NOTE — PROCEDURE: BENIGN DESTRUCTION
Post-Care Instructions: I reviewed with the patient in detail post-care instructions. Patient is to wear sunprotection, and avoid picking at any of the treated lesions. Pt may apply Vaseline to crusted or scabbing areas.
Medical Necessity Information: It is in your best interest to select a reason for this procedure from the list below. All of these items fulfill various CMS LCD requirements except the new and changing color options.
Medical Necessity Clause: This procedure was medically necessary because the lesions that were treated were:
Detail Level: Detailed
Add 52 Modifier (Optional): no
Anesthesia Volume In Cc: 0
Include Z78.9 (Other Specified Conditions Influencing Health Status) As An Associated Diagnosis?: Yes
Consent: The patient's consent was obtained including but not limited to risks of crusting, scabbing, blistering, scarring, darker or lighter pigmentary change, recurrence, incomplete removal and infection.

## 2020-10-09 ENCOUNTER — APPOINTMENT (RX ONLY)
Dept: URBAN - METROPOLITAN AREA CLINIC 349 | Facility: CLINIC | Age: 45
Setting detail: DERMATOLOGY
End: 2020-10-09

## 2020-10-09 DIAGNOSIS — L30.9 DERMATITIS, UNSPECIFIED: ICD-10-CM

## 2020-10-09 PROCEDURE — 87207 SMEAR SPECIAL STAIN: CPT

## 2020-10-09 PROCEDURE — 99212 OFFICE O/P EST SF 10 MIN: CPT | Mod: 24

## 2020-10-09 PROCEDURE — ? TZANCK SMEAR

## 2020-10-09 PROCEDURE — ? COUNSELING

## 2020-10-09 PROCEDURE — ? PRESCRIPTION

## 2020-10-09 RX ORDER — CLINDAMYCIN PHOSPHATE 10 MG/G
GEL TOPICAL
Qty: 1 | Refills: 0 | Status: ERX | COMMUNITY
Start: 2020-10-09

## 2020-10-09 RX ADMIN — CLINDAMYCIN PHOSPHATE: 10 GEL TOPICAL at 00:00

## 2020-10-09 ASSESSMENT — LOCATION DETAILED DESCRIPTION DERM
LOCATION DETAILED: LEFT INFERIOR CENTRAL MALAR CHEEK
LOCATION DETAILED: LEFT INFERIOR CENTRAL MALAR CHEEK

## 2020-10-09 ASSESSMENT — LOCATION SIMPLE DESCRIPTION DERM
LOCATION SIMPLE: LEFT CHEEK
LOCATION SIMPLE: LEFT CHEEK

## 2020-10-09 ASSESSMENT — LOCATION ZONE DERM
LOCATION ZONE: FACE
LOCATION ZONE: FACE

## 2020-10-09 NOTE — HPI: RASH
How Severe Is Your Rash?: mild
Is This A New Presentation, Or A Follow-Up?: Rash
Additional History: Patient had a crown placed by the dentist on 10/07/2020 and started having a red scaly patch that was tender in the same area.

## 2020-10-09 NOTE — PROCEDURE: TZANCK SMEAR
Billing: [id number]: Smear, primary source with interpretation; special stain for inclusion bodies or parasites (eg, malaria, coccidia, microsporidia, trypanosomes, herpes viruses) Billing: [id number]: Smear, primary source with interpretation; special stain for inclusion bodies or parasites (eg, malaria, coccidia, microsporidia, trypanosomes, herpes viruses)

## 2020-11-24 ENCOUNTER — TRANSCRIBE ORDER (OUTPATIENT)
Dept: SCHEDULING | Age: 45
End: 2020-11-24

## 2020-11-24 DIAGNOSIS — Z12.31 SCREENING MAMMOGRAM, ENCOUNTER FOR: Primary | ICD-10-CM

## 2020-12-24 ENCOUNTER — HOSPITAL ENCOUNTER (OUTPATIENT)
Dept: MAMMOGRAPHY | Age: 45
Discharge: HOME | End: 2020-12-24
Attending: OBSTETRICS & GYNECOLOGY
Payer: COMMERCIAL

## 2020-12-24 DIAGNOSIS — Z12.31 SCREENING MAMMOGRAM, ENCOUNTER FOR: ICD-10-CM

## 2020-12-24 PROCEDURE — 77063 BREAST TOMOSYNTHESIS BI: CPT

## 2020-12-30 ENCOUNTER — APPOINTMENT (RX ONLY)
Dept: URBAN - METROPOLITAN AREA CLINIC 349 | Facility: CLINIC | Age: 45
Setting detail: DERMATOLOGY
End: 2020-12-30

## 2020-12-30 DIAGNOSIS — L82.1 OTHER SEBORRHEIC KERATOSIS: ICD-10-CM

## 2020-12-30 DIAGNOSIS — D485 NEOPLASM OF UNCERTAIN BEHAVIOR OF SKIN: ICD-10-CM

## 2020-12-30 PROBLEM — D48.5 NEOPLASM OF UNCERTAIN BEHAVIOR OF SKIN: Status: ACTIVE | Noted: 2020-12-30

## 2020-12-30 PROBLEM — D04.39 CARCINOMA IN SITU OF SKIN OF OTHER PARTS OF FACE: Status: ACTIVE | Noted: 2020-12-30

## 2020-12-30 PROCEDURE — ? BIOPSY BY SHAVE METHOD

## 2020-12-30 PROCEDURE — ? PATHOLOGY BILLING

## 2020-12-30 PROCEDURE — 88305 TISSUE EXAM BY PATHOLOGIST: CPT

## 2020-12-30 PROCEDURE — 11102 TANGNTL BX SKIN SINGLE LES: CPT

## 2020-12-30 PROCEDURE — 99213 OFFICE O/P EST LOW 20 MIN: CPT | Mod: 25

## 2020-12-30 PROCEDURE — A4550 SURGICAL TRAYS: HCPCS

## 2020-12-30 PROCEDURE — ? COUNSELING

## 2020-12-30 ASSESSMENT — LOCATION DETAILED DESCRIPTION DERM
LOCATION DETAILED: RIGHT LATERAL FOREHEAD
LOCATION DETAILED: LEFT SUPERIOR FOREHEAD
LOCATION DETAILED: RIGHT FOREHEAD

## 2020-12-30 ASSESSMENT — LOCATION SIMPLE DESCRIPTION DERM
LOCATION SIMPLE: RIGHT FOREHEAD
LOCATION SIMPLE: LEFT FOREHEAD

## 2020-12-30 ASSESSMENT — LOCATION ZONE DERM: LOCATION ZONE: FACE

## 2020-12-30 NOTE — PROCEDURE: PATHOLOGY BILLING
Immunohistochemistry ([id number] and [id number]) billing is not performed here. Please use the Immunohistochemistry Stain Billing plan to accomplish this. Immunohistochemistry ([id number] and [id number]) billing is not performed here. Please use the Immunohistochemistry Stain Billing plan to accomplish this.

## 2020-12-30 NOTE — PROCEDURE: BIOPSY BY SHAVE METHOD
Hide Anesthesia Volume?: No
Biopsy Method: Dermablade
Dressing: bandage
Electrodesiccation And Curettage Text: The wound bed was treated with electrodesiccation and curettage after the biopsy was performed.
Type Of Destruction Used: Curettage
Biopsy Type: H and E
Bill For Surgical Tray: yes
Anesthesia Type: 2% lidocaine with epinephrine
Anesthesia Volume In Cc (Will Not Render If 0): 0.5
Wound Care: Vaseline
Accession #: dr [id number] read
Consent: Written consent was obtained and risks were reviewed including but not limited to scarring, infection, bleeding, scabbing, incomplete removal, nerve damage and allergy to anesthesia.
Notification Instructions: Patient will be notified of biopsy results. However, patient instructed to call the office if not contacted within 2 weeks.
Electrodesiccation Text: The wound bed was treated with electrodesiccation after the biopsy was performed.
Additional Anesthesia Volume In Cc (Will Not Render If 0): 0
Post-Care Instructions: I reviewed with the patient in detail post-care instructions. Patient is to keep the biopsy site dry overnight, and then apply bacitracin twice daily until healed. Patient may apply hydrogen peroxide soaks to remove any crusting.  After the procedure, the patient was observed for 5-10 minutes and was oriented to person, place and time and demied feeling dizzy, queasy, and stated that they did not feel that they were going to faint.
Silver Nitrate Text: The wound bed was treated with silver nitrate after the biopsy was performed.
Detail Level: Detailed
Information: Selecting Yes will display possible errors in your note based on the variables you have selected. This validation is only offered as a suggestion for you. PLEASE NOTE THAT THE VALIDATION TEXT WILL BE REMOVED WHEN YOU FINALIZE YOUR NOTE. IF YOU WANT TO FAX A PRELIMINARY NOTE YOU WILL NEED TO TOGGLE THIS TO 'NO' IF YOU DO NOT WANT IT IN YOUR FAXED NOTE.
Billing Type: Third-Party Bill
Hemostasis: Aluminum Chloride
Cryotherapy Text: The wound bed was treated with cryotherapy after the biopsy was performed.
Depth Of Biopsy: dermis

## 2021-02-02 ENCOUNTER — HOSPITAL ENCOUNTER (OUTPATIENT)
Dept: PHYSICAL THERAPY | Age: 46
Discharge: HOME | End: 2021-02-02
Payer: COMMERCIAL

## 2021-02-02 PROCEDURE — 97110 THERAPEUTIC EXERCISES: CPT | Performed by: PHYSICAL THERAPIST

## 2021-02-02 PROCEDURE — 97161 PT EVAL LOW COMPLEX 20 MIN: CPT | Performed by: PHYSICAL THERAPIST

## 2021-02-02 NOTE — THERAPY EVALUATION
Jose Meléndez  : 1975  Primary: Sid Duarte Of [facility]mesh Mehta*  Secondary:  [facility], [address], [place]  [phone]   [phone]       OUTPATIENT PHYSICAL THERAPY:Initial Assessment 2021   ICD-10: Treatment Diagnosis: (M62.81) muscle weakness; (R 26.2) Difficulty Walking; (M25.562) L knee pain  Precautions/Allergies:   Patient has no known allergies. TREATMENT PLAN:  Effective Dates: 2021 TO 5/3/2021 (90 days). Frequency/Duration: 2 times a week for 90 Day(s)     MEDICAL/REFERRING DIAGNOSIS:  Pain in left knee [M25.562]   DATE OF ONSET: DOI: 20  REFERRING PHYSICIAN: Rayray Sanders MD MD Orders: Evaluate and Treat  Return MD Appointment: None Scheduled Yet     INITIAL ASSESSMENT:  Ms. Dusty Jeffers presents with decreased L knee AROM and strength and increased L knee swelling and pain from a fall sustained on [place]. Her MRI report states an ACL injury/avulsion from the femoral condyle, as well as, posterior horn medial meniscal tear, as well as, swelling/tissue changes from such injury. She reports pain 4/10 on entry that increased to a 5-6/10 in therapy with exercises. She scored a 62% disability rating on the LEFS. Pt will benefit from skilled PT to address the deficits listed below. PROBLEM LIST (Impacting functional limitations):  1. Decreased Strength  2. Decreased ADL/Functional Activities  3. Decreased Transfer Abilities  4. Decreased Ambulation Ability/Technique  5. Decreased Balance  6. Increased Pain  7. Decreased Flexibility/Joint Mobility  8. Edema/Girth  9. Decreased Knowledge of Precautions  10. Decreased [place] with Home Exercise Program INTERVENTIONS PLANNED: (Treatment may consist of any combination of the following)  1. Balance Exercise  2. Gait Training  3. Heat  4. Home Exercise Program (HEP)  5. Manual Therapy  6.  Neuromuscular Re-education/Strengthening  7. Therapeutic Exercise/Strengthening  8. Kinesiotaping   TREATMENT PLAN:  GOALS: (Goals have been discussed and agreed upon with patient.)  Short-Term Functional Goals: Time Frame: 4-6 weeks (3-16-21)  1. Pt will be compliant and independent with HEP. 2. Pt will improve score on the LEFS to 40/80 to increase pt's overall functional mobility. 3. Pt will improve L knee AROM to 5-120deg to increase pt's ease with transfers and gait. 4. Pt will be able to sit-stand from standard height without use of UE or compensatory weight shifting to rise. 5. Pt will be able to ambulate with a near normal gait pattern (HS/TO seq) for community distances with LRD or no AD. 6. Pt will subjectively report decreased frequency (1x/night) of waking due to L knee pain. Discharge Goals: Time Frame: 8-12 weeks (5-3-21)  1. Pt will improve score on the LEFS to 60/80 to increase pt's overall functional mobility. 2.   Pt will improve L knee AROM to 0-125deg to increase pt's ease with transfers, gait and balance. 3.   Pt will be able to sit-stand from toilet height w/out use of UE for assistance to rise. 4.   Pt will be able to ascend 6-8\" steps reciprocally and descend 6\" steps reciprocally with use of a rail with good form and control. 5.   Pt will ambulate with a normal gait pattern with no AD to increase pt's overall functional mobility. 6.   Pt will lower fall risk by 1 point. 7.   Pt will be DC'd from PT to HEP. OUTCOME MEASURE:   Tool Used: Lower Extremity Functional Scale (LEFS)  Score:  Initial: 30/80=62% disability Most Recent: X/80 (Date: -- )   Interpretation of Score: 20 questions each scored on a 5 point scale with 0 representing \"extreme difficulty or unable to perform\" and 4 representing \"no difficulty\". The lower the score, the greater the functional disability. 80/80 represents no disability. Minimal detectable change is 9 points.       MEDICAL NECESSITY:   · Patient is expected to demonstrate progress in strength, range of motion, balance and coordination to improve gait safety and quality while decreasing pain to increase pt's overall functional mobilty. .    Total Duration: 20 minute evaluation; see daily treatment note  PT Patient Time In/Time Out  Time In: 905  Time Out: 1010    Rehabilitation Potential For Stated Goals: Good  Regarding Bria Cuba's therapy, I certify that the treatment plan above will be carried out by a therapist or under their direction. Thank you for this referral,  Catina Rebollar, PT     Referring Physician Signature: Ronnie Silva MD _______________________________ Date _____________     PAIN/SUBJECTIVE:   Initial: Pain Intensity 1: 4  Pain Location 1: Knee  Pain Orientation 1: Left  Post Session:  5-6/10   HISTORY:   History of Injury/Illness (Reason for Referral):  Patient reports on 20, she slipped on a wet/wooden and fell, tearing/avulsing her L ACL and tearing her medial meniscus. She would like to prevent surgery. She is managing pain with Meloxicam, but admits fear of moving due to further injury. Past Medical History/Comorbidities:   Ms. Jacob Drake  has a past medical history of Allergic rhinitis, Anxiety, Arthritis, Chicken pox, and Pneumonia. Ms. Jacob Drake  has a past surgical history that includes radha biopsy breast stereotactic (); hx  section (); hx breast biopsy (Right); hx breast augmentation; implant breast silicone/eq; and hx orthopaedic (Left). Social History/Living Environment:     Patient lives in a 1 story home with her spouse with 3 steps to enter. Prior Level of Function/Work/Activity:  Patient does not work at this time, however, prior to injury was active in her community, as well as, gym and yoga workouts.    Dominant Side:         RIGHT   Ambulatory/Rehab Services H2 Model Falls Risk Assessment   Risk Factors:       (5)  History of Recent Falls [w/in 3 months] Ability to Rise from Chair:       (1)  Pushes up, successful in one attempt   Falls Prevention Plan:       No modifications necessary   Total: (5 or greater = High Risk): 6   ©2010 [facility] of [facility] Patent #[id number]. Federal Law prohibits the replication, distribution or use without written permission from [facility] of [employer]   Current Medications:       Current Outpatient Medications:     HYDROmorphone (DILAUDID) 2 mg tablet, Take 1 Tab by mouth every three (3) hours as needed for Pain. Max Daily Amount: 16 mg., Disp: 30 Tab, Rfl: 0    CETIRIZINE HCL (ZYRTEC PO), Take 10 mg by mouth Daily (before breakfast). , Disp: , Rfl:    Date Last Reviewed:  2/2/2021     Number of Personal Factors/Comorbidities that affect the Plan of Care: 0: LOW COMPLEXITY   EXAMINATION:   Observation/Orthostatic Postural Assessment:          Patient stands with minimal weight on L LE with partially flexed L knee. Palpation:          Patient is moderately tender with palpation to medial L knee at joint line, as well as , posteriorly. Gait: Patient ambulates with decreased stance time on the L with a step-to gait pattern with L knee flexion during stance phase. Atrophy: moderate atrophy noted in L quads and GS complex compared to the R.   Swelling/Girth (joint line):   L:38.0cm  Shaun's Sign: Neg    LE AROM/Strength DATE  2/2/21 DATE     Hip Flexion R: 5/5  L: 3+/5 R:   L:    Hip Abduction  R: 5/5  L: 3+/5 R:   L:    Hip Extension  R: 5/5  L: 3/5 R:   L:    Knee Flexion  R: 4+/5  L: 112deg/117deg; 3-/5 R:   L:    Knee Extension  R: 5/5  L:17deg/15deg/  12deg; 3-/5 R:   L:    Ankle Dorsiflexion  R: 5/5  L:4/5 R:   L:   Ankle Plantarflexion  R:5/5  L:3+/5 R:  L:   Flexibility: HS/Quads/GS Mod tightness throughout L side      Special Tests/Function:  Stairs: Patient ascends/descends stairs with a step-to gait pattern. Balance:  Patient with good static balance as observed through activities in the gym. Dynamic: further testing required. Standing Heel Raises: NT due to L knee flexed  Sit-stand: Uses B UE, as well as, compensatory weight shifting to the right to rise. Patellar position (static): Body Structures Involved:  1. Nerves  2. Bones  3. Joints  4. Muscles  5. Ligaments Body Functions Affected:  1. Sensory/Pain  2. Neuromusculoskeletal  3. Movement Related Activities and Participation Affected:  1. Mobility  2. Self Care  3. Domestic Life  4.  Community, Social and [place]   Number of elements (examined above) that affect the Plan of Care: 1-2: LOW COMPLEXITY   CLINICAL PRESENTATION:   Presentation: Stable and uncomplicated: LOW COMPLEXITY   CLINICAL DECISION MAKING:   Use of outcome tool(s) and clinical judgement create a POC that gives a: Clear prediction of patient's progress: LOW COMPLEXITY

## 2021-02-02 NOTE — PROGRESS NOTES
Bria Robertson Giambalvo  : 1975  Primary: [facility]*  Secondary:  Therapy Center at [address], [address] [facility], [place] [address]  [phone]   [phone]      OUTPATIENT PHYSICAL THERAPY: Daily Treatment Note 2021  ICD-10: Treatment Diagnosis: (M62.81) muscle weakness; (R 26.2) Difficulty Walking; (M25.562) L knee pain  Precautions/Allergies:   Patient has no known allergies.   TREATMENT PLAN:  Effective Dates: 2021 TO 5/3/2021 (90 days).  Frequency/Duration: 2 times a week for 90 Day(s)     MEDICAL/REFERRING DIAGNOSIS:  Pain in left knee [M25.562]   DATE OF ONSET: DOI: 20  REFERRING PHYSICIAN: Baumgarten, Thomas E, MD MD Orders: Evaluate and Treat  Return MD Appointment: None Scheduled Yet     Pre-treatment Symptoms/Complaints:  Patient c/o L knee pain, stiffness, weakness and immobility.  Pain: Initial: Pain Intensity 1: 4  Pain Location 1: Knee  Pain Orientation 1: Left  Post Session:  5-6/10 soreness and fatigue   Medications Last Reviewed:  2021    Updated Objective Findings:  See evaluation note from today   TREATMENT:   THERAPEUTIC EXERCISE: (40 minutes):  Exercises per grid below to improve mobility, strength, balance and coordination.  Required moderate visual, verbal and tactile cues to promote proper body alignment, promote proper body posture, promote proper body mechanics and promote proper body breathing techniques.  Progressed resistance, range, repetitions and complexity of movement as indicated.   Date:  21 Date:   Date:     Activity/Exercise Parameters Parameters Parameters   AP 10x     Ankle Circles 10x      QS 20x 5 sec     SLR 10 x 3sec     SAQ 10 x 3sec     Bridging w/ add sq 10 x 3sec     LAG 10 x 3sec     HS/GS stretch w/ strap 3 x 20sec     Stdg GS stretch 2 x 20sec                     [employer] Portal  Access Code: [email or web address]   URL: [email or web address]   Date: 2021  Prepared by: Ruth Lantigua     Exercises   Supine Ankle Pumps - 10 reps - 3 sets - 2x daily - 7x weekly   Supine Ankle Circles - 10 reps - 3 sets - 2x daily - 7x weekly   Supine Quad Set - 10 reps - 3 sets - 5 hold - 2x daily - 7x weekly   Long Sitting Quad Set with Towel Roll Under Heel - 10 reps - 3 sets - 5 hold - 2x daily - 7x weekly   Long Sitting Quad Set - 10 reps - 3 sets - 5 hold - 2x daily - 7x weekly   Small Range Straight Leg Raise - 10 reps - 3 sets - 3-5 hold - 2x daily - 7x weekly   Supine Short Arc Quad - 10 reps - 3 sets - 3 hold - 2x daily - 7x weekly   Supine Heel Slide with Small Ball - 10 reps - 3 sets - 3 hold - 2x daily - 7x weekly   Supine Bridge with Mini Swiss Ball Between Knees - 10 reps - 3 sets - 3-5 hold - 2x daily - 7x weekly   Seated Long Arc Quad with Hip Adduction - 10 reps - 3 sets - 3-5 hold - 2x daily - 7x weekly   Seated Calf Stretch with Strap - 5 reps - 20 hold - 2x daily - 7x weekly   Long Sitting Calf Stretch with Strap - 5 reps - 20 hold - 2x daily - 7x weekly     Treatment/Session Summary:    · Response to Treatment:  Patient with increased soreness with above exercises, however, did well, therefore I added those to her HEP. Fer Rios · Communication/Consultation:  HEP 2x daily; Ice/heat prn; NSAIDS/Tylenol for pain  · Equipment provided today:  HEP w/ instruction sheet  · Recommendations/Intent for next treatment session: Next visit will focus on advancements to more challenging knee/LE stretching and strengthening exercises, including gait training and NMES if needed.   · Variance to POC: None    Total Treatment Billable Duration: 40 minutes therex; see initial assessment  PT Patient Time In/Time Out  Time In: 4969  Time Out: 221 Jay Singer, PT    Future Appointments   Date Time Provider Daisha Vazquez   2/4/2021  9:45 AM [facility]   2/9/2021  9:45 AM Zack Records, PT [facility]   2/11/2021  9:45 AM Jess Dickinson, PT [facility]   2/16/2021  9:45 AM Javy Baldwin, PT [facility]   2/18/2021  9:45 AM Javy Baldwin, PT [facility]   2/22/2021  9:45 AM Javy Baldwin, PT [facility]   2/25/2021  9:45 AM Javy Baldwin, PT [facility]   3/2/2021  9:45 AM Javy Baldwin, PT [facility]   3/4/2021  9:45 AM Javy Baldwin, PT [facility]   3/9/2021  9:45 AM Javy Baldwin, PT [facility]   3/11/2021  9:45 AM Jessica Dickinson, PT [facility]

## 2021-02-04 ENCOUNTER — HOSPITAL ENCOUNTER (OUTPATIENT)
Dept: PHYSICAL THERAPY | Age: 46
Discharge: HOME | End: 2021-02-04
Payer: COMMERCIAL

## 2021-02-04 PROCEDURE — 97110 THERAPEUTIC EXERCISES: CPT | Performed by: PHYSICAL THERAPIST

## 2021-02-04 NOTE — PROGRESS NOTES
Katlin Dubose  : 1975  Primary: [facility]*  Secondary:  Therapy Center at [facility]  [facility], [address], [place] [address]  [phone]   [phone]      OUTPATIENT PHYSICAL THERAPY: Daily Treatment Note 2021  ICD-10: Treatment Diagnosis: (M62.81) muscle weakness; (R 26.2) Difficulty Walking; (M25.562) L knee pain  Precautions/Allergies:   Patient has no known allergies. TREATMENT PLAN:  Effective Dates: 2021 TO 5/3/2021 (90 days). Frequency/Duration: 2 times a week for 90 Day(s)     MEDICAL/REFERRING DIAGNOSIS:  Pain in left knee [M25.562]   DATE OF ONSET: DOI: 20  REFERRING PHYSICIAN: Kaykay Elizabeth MD MD Orders: Evaluate and Treat  Return MD Appointment: None Scheduled Yet     Pre-treatment Symptoms/Complaints:  Patient with improved pain and mobility since evaluation earlier this week. Patient reports compliance with HEP 2x/week. Pain: Initial: Pain Intensity 1: 3  Pain Location 1: Knee  Pain Orientation 1: Left  Post Session:  3-4/10 soreness and fatigue   Medications Last Reviewed:  2021    Updated Objective Findings:  L knee AROM: 7-122deg   TREATMENT:   THERAPEUTIC EXERCISE: (45 minutes):  Exercises per grid below to improve mobility, strength, balance and coordination. Required moderate visual, verbal and tactile cues to promote proper body alignment, promote proper body posture, promote proper body mechanics and promote proper body breathing techniques. Progressed resistance, range, repetitions and complexity of movement as indicated.    Date:  21 Date:  21 Date:     Activity/Exercise Parameters Parameters Parameters   AP 10x     Ankle Circles 10x      QS 20x 5 sec W/ estim; 20x    SLR 10 x 3sec W/estim; 20x    SAQ 10 x 3sec W/estim; 20x    Bridging w/ add sq 10 x 3sec 10x    LAQ 10 x 3sec W/estim; 20x    HS/GS stretch w/ strap 3 x 20sec     Stdg GS stretch 2 x 20sec 5 x 30sec hold on slant board    S/L hip ABD  10x                      Gait- HS/TO seq  Demo    Recumbent Stepper  L2; 10min        [employer] Portal  Access Code: [email or web address]   URL: [email or web address]   Date: 02/02/2021   Prepared by: Lizeth Hoyt     Exercises   Supine Ankle Pumps - 10 reps - 3 sets - 2x daily - 7x weekly   Supine Ankle Circles - 10 reps - 3 sets - 2x daily - 7x weekly   Supine Quad Set - 10 reps - 3 sets - 5 hold - 2x daily - 7x weekly   Long Sitting Quad Set with Towel Roll Under Heel - 10 reps - 3 sets - 5 hold - 2x daily - 7x weekly   Long Sitting Quad Set - 10 reps - 3 sets - 5 hold - 2x daily - 7x weekly   Small Range Straight Leg Raise - 10 reps - 3 sets - 3-5 hold - 2x daily - 7x weekly   Supine Short Arc Quad - 10 reps - 3 sets - 3 hold - 2x daily - 7x weekly   Supine Heel Slide with Small Ball - 10 reps - 3 sets - 3 hold - 2x daily - 7x weekly   Supine Bridge with Mini Swiss Ball Between Knees - 10 reps - 3 sets - 3-5 hold - 2x daily - 7x weekly   Seated Long Arc Quad with Hip Adduction - 10 reps - 3 sets - 3-5 hold - 2x daily - 7x weekly   Seated Calf Stretch with Strap - 5 reps - 20 hold - 2x daily - 7x weekly   Long Sitting Calf Stretch with Strap - 5 reps - 20 hold - 2x daily - 7x weekly     Treatment/Session Summary:    · Response to Treatment:  Patient with improved L knee AROM, as well as, quality of L quad contraction after treatment today. · Communication/Consultation:  HEP 2x daily; Ice/heat prn; NSAIDS/Tylenol for pain; HS/TO seq w/ gait  · Equipment provided today:  None today  · Recommendations/Intent for next treatment session: Next visit will focus on advancements to more challenging knee/LE stretching and strengthening exercises, including gait training and NMES if needed.   · Variance to POC: None    Total Treatment Billable Duration: 45 min therex  PT Patient Time In/Time Out  Time In: 0945  Time Out: [facility], PT    Future Appointments   Date Time Provider [facility]   2/9/2021  9:45 AM Eriak Dickinson S, PT [facility]   2/11/2021  9:45 AM Erika Dickinson S, PT [facility]   2/16/2021  9:45 AM Erika Dickinson S, PT [facility]   2/18/2021  9:45 AM Erika Dickinson S, PT [facility]   2/22/2021  9:45 AM Erika Dickinson S, PT [facility]   2/25/2021  9:45 AM Erika Dickinson S, PT [facility]   3/2/2021  9:45 AM Erika Dickinson S, PT [facility]   3/4/2021  9:45 AM Erika Dickinson S, PT [facility]   3/9/2021  9:45 AM Erika Dickinson S, PT [facility]   3/11/2021  9:45 AM Erika Dickinson S, PT [facility]

## 2021-02-09 ENCOUNTER — HOSPITAL ENCOUNTER (OUTPATIENT)
Dept: PHYSICAL THERAPY | Age: 46
Discharge: HOME | End: 2021-02-09
Payer: COMMERCIAL

## 2021-02-09 PROCEDURE — 97110 THERAPEUTIC EXERCISES: CPT | Performed by: PHYSICAL THERAPIST

## 2021-02-09 NOTE — PROGRESS NOTES
Erik Hernandez  : 1975  Primary: [facility]*  Secondary:  [facility] Center at [facility]  [facility], [address], [place] [address]  [phone]   [phone]      OUTPATIENT PHYSICAL THERAPY: Daily Treatment Note 2021  ICD-10: Treatment Diagnosis: (M62.81) muscle weakness; (R 26.2) Difficulty Walking; (M25.562) L knee pain  Precautions/Allergies:   Patient has no known allergies. TREATMENT PLAN:  Effective Dates: 2021 TO 5/3/2021 (90 days). Frequency/Duration: 2 times a week for 90 Day(s)     MEDICAL/REFERRING DIAGNOSIS:  Pain in left knee [M25.562]   DATE OF ONSET: DOI: 20  REFERRING PHYSICIAN: Felisa Campbell MD MD Orders: Evaluate and Treat  Return MD Appointment: None Scheduled Yet     Pre-treatment Symptoms/Complaints:  Patient reports not taking as much Tylenol and NSAIDS as last week. She reports continued compliance with HEP. Pain: Initial:    Post Session:  3-4/10 soreness and fatigue   Medications Last Reviewed:  2021    Updated Objective Findings:  21: L knee AROM: 5-132deg   TREATMENT:   THERAPEUTIC EXERCISE: (65 minutes):  Exercises per grid below to improve mobility, strength, balance and coordination. Required moderate visual, verbal and tactile cues to promote proper body alignment, promote proper body posture, promote proper body mechanics and promote proper body breathing techniques. Progressed resistance, range, repetitions and complexity of movement as indicated.    Date:  21 Date:  21 Date:  21   Activity/Exercise Parameters Parameters Parameters   AP 10x     Ankle Circles 10x      QS 20x 5 sec W/ estim; 20x    SLR 10 x 3sec W/estim; 20x    SAQ 10 x 3sec W/estim; 20x    Bridging w/ add sq 10 x 3sec 10x GTB; 30x   LAQ 10 x 3sec W/estim; 20x    HS/GS stretch w/ strap 3 x 20sec     Stdg GS stretch 2 x 20sec 5 x 30sec hold on slant board 5 x 30sec hold on slant board   S/L hip ABD  10x GTB; 30x B   Clams   GTB; 30x B   Seated LAQ w/ band   RTB; 30x B   Seated HSC w/ band   RTB; 30x B                           Shuttle SL   75# R; 20x; 50# L; 20x   Shuttle B; w  Porsha Hamper squeeze; w/GTB   75#; 20x each   Kinesiotape for patellar support   Done   Gait- HS/TO seq  Demo HSG; 3 laps   Recumbent Stepper  L2; 10min Recumbent bike; 10min       [employer] Portal  Access Code: [email or web address]   URL: [email or web address]   Date: 02/09/2021   Prepared by: Wilma Bey     Exercises   Sidelying Hip Abduction - 10 reps - 3 sets - 1x daily - 7x weekly   Sidelying Hip Abduction with Resistance at Thighs - 10 reps - 3 sets - 1x daily - 7x weekly   Clamshell with Resistance - 10 reps - 3 sets - 1x daily - 7x weekly   Bridge with Hip Abduction and Resistance - 10 reps - 3 sets - 1x daily - 7x weekly   Seated Knee Extension with Resistance - 10 reps - 3 sets - 1x daily - 7x weekly   Seated Hamstring Curls with Resistance - 10 reps - 3 sets - 1x daily - 7x weekly     Access Code: [email or web address]   URL: [email or web address]   Date: 02/02/2021   Prepared by: Wilma Bey     Exercises   Supine Ankle Pumps - 10 reps - 3 sets - 2x daily - 7x weekly   Supine Ankle Circles - 10 reps - 3 sets - 2x daily - 7x weekly   Supine Quad Set - 10 reps - 3 sets - 5 hold - 2x daily - 7x weekly   Long Sitting Quad Set with Towel Roll Under Heel - 10 reps - 3 sets - 5 hold - 2x daily - 7x weekly   Long Sitting Quad Set - 10 reps - 3 sets - 5 hold - 2x daily - 7x weekly   Small Range Straight Leg Raise - 10 reps - 3 sets - 3-5 hold - 2x daily - 7x weekly   Supine Short Arc Quad - 10 reps - 3 sets - 3 hold - 2x daily - 7x weekly   Supine Heel Slide with Small Ball - 10 reps - 3 sets - 3 hold - 2x daily - 7x weekly   Supine Bridge with Mini Swiss Ball Between Knees - 10 reps - 3 sets - 3-5 hold - 2x daily - 7x weekly   Seated Long Arc Quad with Hip Adduction - 10 reps - 3 sets - 3-5 hold - 2x daily - 7x weekly   Seated Calf Stretch with Strap - 5 reps - 20 hold - 2x daily - 7x weekly   Long Sitting Calf Stretch with Strap - 5 reps - 20 hold - 2x daily - 7x weekly     Treatment/Session Summary:    · Response to Treatment:  L knee AROM continues to improve, thus improving gait quality. Still unable to fully extend L knee during stance phase of gait. Improved L quad contraction. She did well with addition of exercises with theraband, therefore I added those to her current HEP. · Communication/Consultation:  HEP 2x daily; Ice/heat prn; NSAIDS/Tylenol for pain; HS/TO seq w/ gait  · Equipment provided today:  HEP w/instruction sheet and GTB/RTB  · Recommendations/Intent for next treatment session: Next visit will focus on advancements to more challenging knee/LE stretching and strengthening exercises, including gait training and NMES if needed.   · Variance to POC: None    Total Treatment Billable Duration: 65 min therex     Robson Washington PT    Future Appointments   Date Time Provider Daisha Vazquez   2/9/2021  9:45 AM Nader Chiquito, PT [facility]   2/11/2021  9:45 AM Nader Chiquito, PT [facility]   2/16/2021  9:45 AM Nader Chiquito, PT [facility]   2/18/2021  9:45 AM Nader Chiquito, PT [facility]   2/22/2021  9:45 AM Nader Chiquito, PT [facility]   2/25/2021  9:45 AM Nader Chiquito, PT [facility]   3/2/2021  9:45 AM Nader Chiquito, PT [facility]   3/4/2021  9:45 AM Nader Chiquito, PT [facility]   3/9/2021  9:45 AM Nader Chiquito, PT [facility]   3/11/2021  9:45 AM Samantha Dickinson, PT [facility]

## 2021-02-11 ENCOUNTER — HOSPITAL ENCOUNTER (OUTPATIENT)
Dept: PHYSICAL THERAPY | Age: 46
Discharge: HOME | End: 2021-02-11
Payer: COMMERCIAL

## 2021-02-11 PROCEDURE — 97110 THERAPEUTIC EXERCISES: CPT | Performed by: PHYSICAL THERAPIST

## 2021-02-11 PROCEDURE — 97016 VASOPNEUMATIC DEVICE THERAPY: CPT | Performed by: PHYSICAL THERAPIST

## 2021-02-11 NOTE — PROGRESS NOTES
Herrera Castano  : 1975  Primary: [facility]*  Secondary:  [facility]rapy Center at [facility]  [facility], [address], [place] [address]  [phone]   [phone]      OUTPATIENT PHYSICAL THERAPY: Daily Treatment Note 2021  ICD-10: Treatment Diagnosis: (M62.81) muscle weakness; (R 26.2) Difficulty Walking; (M25.562) L knee pain  Precautions/Allergies:   Patient has no known allergies. TREATMENT PLAN:  Effective Dates: 2021 TO 5/3/2021 (90 days). Frequency/Duration: 2 times a week for 90 Day(s)     MEDICAL/REFERRING DIAGNOSIS:  Pain in left knee [M25.562]   DATE OF ONSET: DOI: 20  REFERRING PHYSICIAN: Scarlet Sampson MD MD Orders: Evaluate and Treat  Return MD Appointment: None Scheduled Yet     Pre-treatment Symptoms/Complaints:  Patient reports increased L knee pain and swelling since last PT visit. She reports she feels we \"over did it\" a lot. Patient declines HEP since then, which I agreed was a good idea. Pain: Initial: Pain Intensity 1: 5  Pain Location 1: Knee  Pain Orientation 1: Left  Post Session:  4/10 soreness and fatigue   Medications Last Reviewed:  2021    Updated Objective Findings:  21: L knee AROM: 5-132deg   TREATMENT:   THERAPEUTIC EXERCISE: (45 minutes):  Exercises per grid below to improve mobility, strength, balance and coordination. Required moderate visual, verbal and tactile cues to promote proper body alignment, promote proper body posture, promote proper body mechanics and promote proper body breathing techniques. Progressed resistance, range, repetitions and complexity of movement as indicated.      Date:  21 Date:  21 Date:  21 Date:  21   Activity/Exercise Parameters Parameters Parameters    AP 10x      Ankle Circles 10x       QS 20x 5 sec W/ estim; 20x  15 x 5sec hold   SLR w/ eccentric lowering    20x   SLR 10 x 3sec W/estim; 20x  20x   SAQ 10 x 3sec W/estim; 20x 20x   Bridging w/ add sq 10 x 3sec 10x GTB; 30x NB; 20x   LAQ 10 x 3sec W/estim; 20x  20x   HS/GS stretch w/ strap 3 x 20sec      Stdg GS stretch 2 x 20sec 5 x 30sec hold on slant board 5 x 30sec hold on slant board    S/L hip ABD  10x GTB; 30x B 15x; RTB; 30x B   Clams   GTB; 30x B RTB; 30x   Seated LAQ w/ band   RTB; 30x B    Seated HSC w/ band   RTB; 30x B                                Shuttle SL   75# R; 20x; 50# L; 20x    Shuttle B; w  Martin Piano squeeze; w/GTB   75#; 20x each    Kinesiotape for patellar support   Done    Gait- HS/TO seq  Demo HSG; 3 laps    Recumbent Stepper  L2; 10min Recumbent bike; 10min L1; 10min   MODALITIES: (15 minutes):      Vasopneumatic Cold Compression @ 34deg; med compression w/ B LE elevated on wedge to aide with decreasing pain, inflammation and swelling to improve L knee AROM     [employer] Portal  Access Code: [email or web address]   URL: [email or web address]   Date: 02/09/2021   Prepared by: Radha Toney     Exercises   Sidelying Hip Abduction - 10 reps - 3 sets - 1x daily - 7x weekly   Sidelying Hip Abduction with Resistance at Thighs - 10 reps - 3 sets - 1x daily - 7x weekly   Clamshell with Resistance - 10 reps - 3 sets - 1x daily - 7x weekly   Bridge with Hip Abduction and Resistance - 10 reps - 3 sets - 1x daily - 7x weekly   Seated Knee Extension with Resistance - 10 reps - 3 sets - 1x daily - 7x weekly   Seated Hamstring Curls with Resistance - 10 reps - 3 sets - 1x daily - 7x weekly     Access Code: [email or web address]   URL: [email or web address]   Date: 02/02/2021   Prepared by: Radha Toney     Exercises   Supine Ankle Pumps - 10 reps - 3 sets - 2x daily - 7x weekly   Supine Ankle Circles - 10 reps - 3 sets - 2x daily - 7x weekly   Supine Quad Set - 10 reps - 3 sets - 5 hold - 2x daily - 7x weekly   Long Sitting Quad Set with Towel Roll Under Heel - 10 reps - 3 sets - 5 hold - 2x daily - 7x weekly   Long Sitting Quad Set - 10 reps - 3 sets - 5 hold - 2x daily - 7x weekly   Small Range Straight Leg Raise - 10 reps - 3 sets - 3-5 hold - 2x daily - 7x weekly   Supine Short Arc Quad - 10 reps - 3 sets - 3 hold - 2x daily - 7x weekly   Supine Heel Slide with Small Ball - 10 reps - 3 sets - 3 hold - 2x daily - 7x weekly   Supine Bridge with Mini Swiss Ball Between Knees - 10 reps - 3 sets - 3-5 hold - 2x daily - 7x weekly   Seated Long Arc Quad with Hip Adduction - 10 reps - 3 sets - 3-5 hold - 2x daily - 7x weekly   Seated Calf Stretch with Strap - 5 reps - 20 hold - 2x daily - 7x weekly   Long Sitting Calf Stretch with Strap - 5 reps - 20 hold - 2x daily - 7x weekly     Treatment/Session Summary:    · Response to Treatment:  Patient with more pain and stiffness/swelling during therapy, however, was still able to perform above mat strengthening exercises with good form and mild fatigue. L quad still requires S/L hip abduction to wake up to perform exercises. · Communication/Consultation:  HEP 2x daily; Ice/heat prn; NSAIDS/Tylenol for pain; HS/TO seq w/ gait  · Equipment provided today:  None today  · Recommendations/Intent for next treatment session: Next visit will focus on advancements to more challenging knee/LE stretching and strengthening exercises, including gait training and NMES if needed.   · Variance to POC: None    Total Treatment Billable Duration: 60 min ( 45 min therex; 15 min vaso)  PT Patient Time In/Time Out  Time In: 0945  Time Out: [address], PT    Future Appointments   Date Time Provider Daisha Vazquez   2/16/2021  9:45 AM Dave Cyr, PT [facility]   2/18/2021  9:45 AM Dave Cyr, PT [facility]   2/22/2021  9:45 AM Dave Cyr, PT [facility]   2/25/2021  9:45 AM Dave Cyr, PT [facility]   3/2/2021  9:45 AM Dave Cyr, PT [facility]   3/4/2021  9:45 AM Dave Cyr, PT [facility]   3/9/2021  9:45 AM Dave Cyr, PT [facility]   3/11/2021 9:45 AM Celeste Dickinson[facility]

## 2021-02-16 ENCOUNTER — HOSPITAL ENCOUNTER (OUTPATIENT)
Dept: PHYSICAL THERAPY | Age: 46
Discharge: HOME | End: 2021-02-16
Payer: COMMERCIAL

## 2021-02-16 PROCEDURE — 97110 THERAPEUTIC EXERCISES: CPT | Performed by: PHYSICAL THERAPIST

## 2021-02-16 PROCEDURE — 97016 VASOPNEUMATIC DEVICE THERAPY: CPT | Performed by: PHYSICAL THERAPIST

## 2021-02-16 NOTE — PROGRESS NOTES
Timmy Nelson  : 1975  Primary: Sid AyalaElena Diane Of Umesh Mehta*  Secondary:  [facility]  [facility], [address], [place] [address]  [phone]   [phone]      OUTPATIENT PHYSICAL THERAPY: Daily Treatment Note 2021  ICD-10: Treatment Diagnosis: (M62.81) muscle weakness; (R 26.2) Difficulty Walking; (M25.562) L knee pain  Precautions/Allergies:   Patient has no known allergies. TREATMENT PLAN:  Effective Dates: 2021 TO 5/3/2021 (90 days). Frequency/Duration: 2 times a week for 90 Day(s)     MEDICAL/REFERRING DIAGNOSIS:  Pain in left knee [M25.562]   DATE OF ONSET: DOI: 20  REFERRING PHYSICIAN: Cleveland Griffith MD MD Orders: Evaluate and Treat  Return MD Appointment: None Scheduled Yet     Pre-treatment Symptoms/Complaints:  Patient reports she \"moved the wrong way\" in the pouring rain yesterday and \"tweaked\" her L knee. She states pain is still better than when she started, but not as good as a week ago. Pain: Initial: Pain Intensity 1: 4  Pain Location 1: Knee  Pain Orientation 1: Left  Post Session:  3-4/10 soreness and fatigue   Medications Last Reviewed:  2021    Updated Objective Findings:  21: L knee AROM on entry: 8-123deg; After exercises: 6-125deg   TREATMENT:   THERAPEUTIC EXERCISE: (45 minutes):  Exercises per grid below to improve mobility, strength, balance and coordination. Required moderate visual, verbal and tactile cues to promote proper body alignment, promote proper body posture, promote proper body mechanics and promote proper body breathing techniques. Progressed resistance, range, repetitions and complexity of movement as indicated.      Date:  21 Date:  21 Date:  21 Date:  21 Date:  21   Activity/Exercise Parameters Parameters Parameters     AP 10x       Ankle Circles 10x        QS 20x 5 sec W/ estim; 20x  15 x 5sec hold 15 x 5sec   SLR w/ eccentric lowering 20x 20x   SLR 10 x 3sec W/estim; 20x  20x 20x   SAQ 10 x 3sec W/estim; 20x  20x 20x   Bridging w/ add sq 10 x 3sec 10x GTB; 30x NB; 20x NB; 20x   LAQ 10 x 3sec W/estim; 20x  20x    HS/GS stretch w/ strap 3 x 20sec       Stdg GS stretch 2 x 20sec 5 x 30sec hold on slant board 5 x 30sec hold on slant board  5 x 30sec on board   S/L hip ABD  10x GTB; 30x B 15x; RTB; 30x B RTB; 20x B   Clams   GTB; 30x B RTB; 30x RTB; 20x B   Seated LAQ w/ band   RTB; 30x B  RTB; 20x   Seated HSC w/ band   RTB; 30x B                                     Shuttle SL   75# R; 20x; 50# L; 20x     Shuttle B; w  Luvenia Daft squeeze; w/GTB   75#; 20x each  50#; 30x B   Kinesiotape for patellar support   Done     Gait- HS/TO seq  Demo HSG; 3 laps     Recumbent Stepper  L2; 10min Recumbent bike; 10min L1; 10min L2; 10min   MODALITIES: (15 minutes):      Vasopneumatic Cold Compression @ 34deg; med compression w/ B LE elevated on wedge to aide with decreasing pain, inflammation and swelling to improve L knee AROM     [employer] Portal  Access Code: [email or web address]   URL: [email or web address] "[email or web address]"/   Date: 02/09/2021   Prepared by: Yolis Standing     Exercises   Sidelying Hip Abduction - 10 reps - 3 sets - 1x daily - 7x weekly   Sidelying Hip Abduction with Resistance at Thighs - 10 reps - 3 sets - 1x daily - 7x weekly   Clamshell with Resistance - 10 reps - 3 sets - 1x daily - 7x weekly   Bridge with Hip Abduction and Resistance - 10 reps - 3 sets - 1x daily - 7x weekly   Seated Knee Extension with Resistance - 10 reps - 3 sets - 1x daily - 7x weekly   Seated Hamstring Curls with Resistance - 10 reps - 3 sets - 1x daily - 7x weekly     Access Code: [email or web address]   URL: [email or web address] "[email or web address]"/   Date: 02/02/2021   Prepared by: Yolis Standing     Exercises   Supine Ankle Pumps - 10 reps - 3 sets - 2x daily - 7x weekly   Supine Ankle Circles - 10 reps - 3 sets - 2x daily - 7x weekly   Supine Quad Set - 10 reps - 3 sets - 5 hold - 2x daily - 7x weekly   Long Sitting Quad Set with Towel Roll Under Heel - 10 reps - 3 sets - 5 hold - 2x daily - 7x weekly   Long Sitting Quad Set - 10 reps - 3 sets - 5 hold - 2x daily - 7x weekly   Small Range Straight Leg Raise - 10 reps - 3 sets - 3-5 hold - 2x daily - 7x weekly   Supine Short Arc Quad - 10 reps - 3 sets - 3 hold - 2x daily - 7x weekly   Supine Heel Slide with Small Ball - 10 reps - 3 sets - 3 hold - 2x daily - 7x weekly   Supine Bridge with Mini Swiss Ball Between Knees - 10 reps - 3 sets - 3-5 hold - 2x daily - 7x weekly   Seated Long Arc Quad with Hip Adduction - 10 reps - 3 sets - 3-5 hold - 2x daily - 7x weekly   Seated Calf Stretch with Strap - 5 reps - 20 hold - 2x daily - 7x weekly   Long Sitting Calf Stretch with Strap - 5 reps - 20 hold - 2x daily - 7x weekly     Treatment/Session Summary:    · Response to Treatment:  Improved quality and control with above exercises. More L knee swelling on entry, improved after [address]. Gait more antalgic today. · Communication/Consultation:  HEP 2x daily; Ice/heat prn; NSAIDS/Tylenol for pain; HS/TO seq w/ gait  · Equipment provided today:  None today  · Recommendations/Intent for next treatment session: Next visit will focus on advancements to more challenging knee/LE stretching and strengthening exercises, including gait training and NMES if needed.   · Variance to POC: None    Total Treatment Billable Duration: 60 min ( 45 min therex; 15 min vaso)  PT Patient Time In/Time Out  Time In: 0945  Time Out: [address] [facility],     Future Appointments   Date Time Provider Daisha Vazquez   2/18/2021  9:45 AM Murray Washington, PT [facility]   2/22/2021  9:45 AM Murray Washington, PT [facility]   2/25/2021  9:45 AM Murrayluz Washington, PT [facility]   3/2/2021  9:45 AM Murrayluz Washington, PT [facility]   3/4/2021  9:45 AM Murrayluz Washington, PT [facility]   3/9/2021  9:45 AM Murray Felix, PT [facility]   3/11/2021 9:45 AM Nader Dickinson, PT [facility]

## 2021-02-18 ENCOUNTER — HOSPITAL ENCOUNTER (OUTPATIENT)
Dept: PHYSICAL THERAPY | Age: 46
Discharge: HOME | End: 2021-02-18
Payer: COMMERCIAL

## 2021-02-18 PROCEDURE — 97016 VASOPNEUMATIC DEVICE THERAPY: CPT | Performed by: PHYSICAL THERAPIST

## 2021-02-18 PROCEDURE — 97110 THERAPEUTIC EXERCISES: CPT | Performed by: PHYSICAL THERAPIST

## 2021-02-18 NOTE — PROGRESS NOTES
Erik Hernandez  : 1975  Primary: [facility] Of Umesh Mehta*  Secondary:  [facility] at [place] [address], [address], [place] [address]  [phone]   [phone]      OUTPATIENT PHYSICAL THERAPY: Daily Treatment Note 2021  ICD-10: Treatment Diagnosis: (M62.81) muscle weakness; (R 26.2) Difficulty Walking; (M25.562) L knee pain  Precautions/Allergies:   Patient has no known allergies. TREATMENT PLAN:  Effective Dates: 2021 TO 5/3/2021 (90 days). Frequency/Duration: 2 times a week for 90 Day(s)     MEDICAL/REFERRING DIAGNOSIS:  Pain in left knee [M25.562]   DATE OF ONSET: DOI: 20  REFERRING PHYSICIAN: Felisa Campbell MD MD Orders: Evaluate and Treat  Return MD Appointment: None Scheduled Yet     Pre-treatment Symptoms/Complaints:  Patient reports more L knee pain and swelling today due to the cold, rainy weather. Pain: Initial: Pain Intensity 1: 4  Pain Location 1: Knee  Pain Orientation 1: Left  Post Session:  3-4/10 soreness and fatigue   Medications Last Reviewed:  2021    Updated Objective Findings:  21: L knee AROM on entry: 8-123deg; After exercises: 6-125deg   TREATMENT:   THERAPEUTIC EXERCISE: (45 minutes):  Exercises per grid below to improve mobility, strength, balance and coordination. Required moderate visual, verbal and tactile cues to promote proper body alignment, promote proper body posture, promote proper body mechanics and promote proper body breathing techniques. Progressed resistance, range, repetitions and complexity of movement as indicated.      Date:  21 Date:  21 Date:  21 Date:  21 Date:  21 Date:  21   Activity/Exercise Parameters Parameters Parameters      AP 10x        Ankle Circles 10x         QS 20x 5 sec W/ estim; 20x  15 x 5sec hold 15 x 5sec    SLR w/ eccentric lowering    20x 20x    SLR 10 x 3sec W/estim; 20x  20x 20x    SAQ 10 x 3sec W/estim; 20x 20x 20x    Bridging w/ add sq 10 x 3sec 10x GTB; 30x NB; 20x NB; 20x    LAQ 10 x 3sec W/estim; 20x  20x     HS/GS stretch w/ strap 3 x 20sec        Stdg GS stretch 2 x 20sec 5 x 30sec hold on slant board 5 x 30sec hold on slant board  5 x 30sec on board 5 x 30sec on board   S/L hip ABD  10x GTB; 30x B 15x; RTB; 30x B RTB; 20x B    Clams   GTB; 30x B RTB; 30x RTB; 20x B    Seated LAQ w/ band   RTB; 30x B  RTB; 20x RTB; 20x   Seated HSC w/ band   RTB; 30x B   RTB; 20x   Stdg TKE      RTB; 20x   Resisted side-stepping      RTB; thighs; 50' x 4                     Shuttle SL   75# R; 20x; 50# L; 20x      Shuttle B; w  Ileene Beltrami squeeze; w/GTB   75#; 20x each  50#; 30x B 62.5#; 40#   Kinesiotape for patellar support   Done      Gait- HS/TO seq  Demo HSG; 3 laps      Recumbent Stepper  L2; 10min Recumbent bike; 10min L1; 10min L2; 10min L2.5; 12min   MODALITIES: (15 minutes):      Vasopneumatic Cold Compression @ 34deg; med compression w/ B LE elevated on wedge to aide with decreasing pain, inflammation and swelling to improve L knee AROM     [employer] Portal  Access Code: [email or web address]   URL: [email or web address]   Date: 02/18/2021   Prepared by: Nader Montoya     Exercises   Standing Terminal Knee Extension with Resistance - 10 reps - 2 sets - 2x daily - 7x weekly     Access Code: [email or web address]   URL: [email or web address]   Date: 02/09/2021   Prepared by: Nader Montoya     Exercises   Sidelying Hip Abduction - 10 reps - 3 sets - 1x daily - 7x weekly   Sidelying Hip Abduction with Resistance at Thighs - 10 reps - 3 sets - 1x daily - 7x weekly   Clamshell with Resistance - 10 reps - 3 sets - 1x daily - 7x weekly   Bridge with Hip Abduction and Resistance - 10 reps - 3 sets - 1x daily - 7x weekly   Seated Knee Extension with Resistance - 10 reps - 3 sets - 1x daily - 7x weekly   Seated Hamstring Curls with Resistance - 10 reps - 3 sets - 1x daily - 7x weekly     Access Code: [email or web address]   URL: [email or web address]   Date: 02/02/2021   Prepared by: Demetrius Estrada     Exercises   Supine Ankle Pumps - 10 reps - 3 sets - 2x daily - 7x weekly   Supine Ankle Circles - 10 reps - 3 sets - 2x daily - 7x weekly   Supine Quad Set - 10 reps - 3 sets - 5 hold - 2x daily - 7x weekly   Long Sitting Quad Set with Towel Roll Under Heel - 10 reps - 3 sets - 5 hold - 2x daily - 7x weekly   Long Sitting Quad Set - 10 reps - 3 sets - 5 hold - 2x daily - 7x weekly   Small Range Straight Leg Raise - 10 reps - 3 sets - 3-5 hold - 2x daily - 7x weekly   Supine Short Arc Quad - 10 reps - 3 sets - 3 hold - 2x daily - 7x weekly   Supine Heel Slide with Small Ball - 10 reps - 3 sets - 3 hold - 2x daily - 7x weekly   Supine Bridge with Mini Swiss Ball Between Knees - 10 reps - 3 sets - 3-5 hold - 2x daily - 7x weekly   Seated Long Arc Quad with Hip Adduction - 10 reps - 3 sets - 3-5 hold - 2x daily - 7x weekly   Seated Calf Stretch with Strap - 5 reps - 20 hold - 2x daily - 7x weekly   Long Sitting Calf Stretch with Strap - 5 reps - 20 hold - 2x daily - 7x weekly     Treatment/Session Summary:    · Response to Treatment:  No significant change in L knee AROM today. Improving core, B hip and L LE strength. Gait more antalgic today due to swelling. Decreased swelling after VCC. · Communication/Consultation:  HEP 2x daily; Ice/heat prn; NSAIDS/Tylenol for pain; HS/TO seq w/ gait  · Equipment provided today:  None today  · Recommendations/Intent for next treatment session: Next visit will focus on advancements to more challenging knee/LE stretching and strengthening exercises, including gait training and NMES if needed.   · Variance to POC: None    Total Treatment Billable Duration: 60 min ( 45 min therex; 15 min vaso)  PT Patient Time In/Time Out  Time In: 0945  Time Out: [address], PT    Future Appointments   Date Time Provider Daisha Taylor   2/22/2021  9:45 AM [facility] Dickinson[facility] 2/25/2021  9:45 AM eJannie Dickinson, PT [facility]   3/2/2021  9:45 AM Jeannie Dickinson, PT [facility]   3/4/2021  9:45 AM Jeannie Dickinson, PT [facility]   3/9/2021  9:45 AM Jeannie Dickinson, PT [facility]   3/11/2021  9:45 AM [facility]os[facility]

## 2021-02-22 ENCOUNTER — HOSPITAL ENCOUNTER (OUTPATIENT)
Dept: PHYSICAL THERAPY | Age: 46
Discharge: HOME | End: 2021-02-22
Payer: COMMERCIAL

## 2021-02-22 PROCEDURE — 97110 THERAPEUTIC EXERCISES: CPT | Performed by: PHYSICAL THERAPIST

## 2021-02-22 NOTE — PROGRESS NOTES
Derrick Wong  : 1975  Primary: Sid Briceno Of Umesh Mehta*  Secondary:  Therapy Center at [facility]  [address] , [address], [place] [address]  [phone]   [phone]      OUTPATIENT PHYSICAL THERAPY: Daily Treatment Note 2021  ICD-10: Treatment Diagnosis: (M62.81) muscle weakness; (R 26.2) Difficulty Walking; (M25.562) L knee pain  Precautions/Allergies:   Patient has no known allergies. TREATMENT PLAN:  Effective Dates: 2021 TO 5/3/2021 (90 days). Frequency/Duration: 2 times a week for 90 Day(s)     MEDICAL/REFERRING DIAGNOSIS:  Pain in left knee [M25.562]   DATE OF ONSET: DOI: 20  REFERRING PHYSICIAN: Karen Juárez MD MD Orders: Evaluate and Treat  Return MD Appointment: None Scheduled Yet     Pre-treatment Symptoms/Complaints:  Patient reports she feels her knee has improved, however, she saw MD last Thursday, and has decided on a reconstruction to be performed on 3/5/21. Patient requests shortening visit due to having another appointment. Pain: Initial: Pain Intensity 1: 3  Pain Location 1: Knee  Pain Orientation 1: Left  Post Session:  3-4/10 soreness and fatigue   Medications Last Reviewed:  2021    Updated Objective Findings:  21: L knee AROM on entry: 8-123deg; After exercises: 6-125deg   TREATMENT:   THERAPEUTIC EXERCISE: (30 minutes):  Exercises per grid below to improve mobility, strength, balance and coordination. Required moderate visual, verbal and tactile cues to promote proper body alignment, promote proper body posture, promote proper body mechanics and promote proper body breathing techniques. Progressed resistance, range, repetitions and complexity of movement as indicated.      Date:  21 Date:  21 Date:  21 Date:  21 Date:  21 Date:  21 Date:  21   Activity/Exercise Parameters Parameters Parameters       AP 10x         Ankle Circles 10x          QS 20x 5 sec W/ estim; 20x  15 x 5sec hold 15 x 5sec     SLR w/ eccentric lowering    20x 20x     SLR 10 x 3sec W/estim; 20x  20x 20x     SAQ 10 x 3sec W/estim; 20x  20x 20x     Bridging w/ add sq 10 x 3sec 10x GTB; 30x NB; 20x NB; 20x     LAQ 10 x 3sec W/estim; 20x  20x      HS/GS stretch w/ strap 3 x 20sec         Stdg GS stretch 2 x 20sec 5 x 30sec hold on slant board 5 x 30sec hold on slant board  5 x 30sec on board 5 x 30sec on board 5 x 30sec on board   S/L hip ABD  10x GTB; 30x B 15x; RTB; 30x B RTB; 20x B     Clams   GTB; 30x B RTB; 30x RTB; 20x B     Seated LAQ w/ band   RTB; 30x B  RTB; 20x RTB; 20x    Seated HSC w/ band   RTB; 30x B   RTB; 20x    Stdg TKE      RTB; 20x    Resisted side-stepping      RTB; thighs; 50' x 4 RTB; thighs; 50' x 6   Resisted diagonals       RTB; thighs; 50' x 4             Shuttle SL   75# R; 20x; 50# L; 20x    50#; 20x L   Shuttle B; w  Lala Deter squeeze; w/GTB   75#; 20x each  50#; 30x B 62.5#; 40# 75#; 40x B   Kinesiotape for patellar support   Done       Gait- HS/TO seq  Demo HSG; 3 laps       Recumbent Stepper  L2; 10min Recumbent bike; 10min L1; 10min L2; 10min L2.5; 12min L3; 9min   MODALITIES: (0 minutes):      Vasopneumatic Cold Compression @ 34deg; med compression w/ B LE elevated on wedge to aide with decreasing pain, inflammation and swelling to improve L knee AROM     [employer] Portal  Access Code: [email or web address]   URL: [email or web address]   Date: 02/18/2021   Prepared by: Vlad Bernardo     Exercises   Standing Terminal Knee Extension with Resistance - 10 reps - 2 sets - 2x daily - 7x weekly     Access Code: [email or web address]   URL: [email or web address]   Date: 02/09/2021   Prepared by: Vlad Bernardo     Exercises   Sidelying Hip Abduction - 10 reps - 3 sets - 1x daily - 7x weekly   Sidelying Hip Abduction with Resistance at Thighs - 10 reps - 3 sets - 1x daily - 7x weekly   Clamshell with Resistance - 10 reps - 3 sets - 1x daily - 7x weekly   Bridge with Hip Abduction and Resistance - 10 reps - 3 sets - 1x daily - 7x weekly   Seated Knee Extension with Resistance - 10 reps - 3 sets - 1x daily - 7x weekly   Seated Hamstring Curls with Resistance - 10 reps - 3 sets - 1x daily - 7x weekly     Access Code: [email or web address]   URL: [email or web address]   Date: 02/02/2021   Prepared by: Romi Al     Exercises   Supine Ankle Pumps - 10 reps - 3 sets - 2x daily - 7x weekly   Supine Ankle Circles - 10 reps - 3 sets - 2x daily - 7x weekly   Supine Quad Set - 10 reps - 3 sets - 5 hold - 2x daily - 7x weekly   Long Sitting Quad Set with Towel Roll Under Heel - 10 reps - 3 sets - 5 hold - 2x daily - 7x weekly   Long Sitting Quad Set - 10 reps - 3 sets - 5 hold - 2x daily - 7x weekly   Small Range Straight Leg Raise - 10 reps - 3 sets - 3-5 hold - 2x daily - 7x weekly   Supine Short Arc Quad - 10 reps - 3 sets - 3 hold - 2x daily - 7x weekly   Supine Heel Slide with Small Ball - 10 reps - 3 sets - 3 hold - 2x daily - 7x weekly   Supine Bridge with Mini Swiss Ball Between Knees - 10 reps - 3 sets - 3-5 hold - 2x daily - 7x weekly   Seated Long Arc Quad with Hip Adduction - 10 reps - 3 sets - 3-5 hold - 2x daily - 7x weekly   Seated Calf Stretch with Strap - 5 reps - 20 hold - 2x daily - 7x weekly   Long Sitting Calf Stretch with Strap - 5 reps - 20 hold - 2x daily - 7x weekly     Treatment/Session Summary:    · Response to Treatment:  No significant change in L knee AROM today. Improving core, B hip and L LE strength. Gait more antalgic today due to swelling. Decreased swelling after VCC. · Communication/Consultation:  HEP 2x daily; Ice/heat prn; NSAIDS/Tylenol for pain; HS/TO seq w/ gait  · Equipment provided today:  None today  · Recommendations/Intent for next treatment session: Next visit will be an evaluation/re-assessment as patient is to have an ACL recon on 3/5/21. She will perform HEP on her own until then to conserve insurance benefits.      Total Treatment Billable Duration: 30 min ( 30 min therex)  PT Patient Time In/Time Out  Time In: 0945  Time Out: 1430 [facility], PT    Future Appointments   Date Time Provider Daisha Vazquez   2/26/2021  8:45 AM [address] DRIVE THRU [facility] IMD IMD   2/26/2021  3:30 PM [facility] PHONE APPOINTMENT ELIANA NICHOLAS OR PRE A   3/9/2021  9:45 AM David Juarez, PT [facility]   3/11/2021  9:45 AM Chris Dickinson[facility]

## 2021-02-25 ENCOUNTER — APPOINTMENT (OUTPATIENT)
Dept: PHYSICAL THERAPY | Age: 46
End: 2021-02-25
Payer: COMMERCIAL

## 2021-03-02 ENCOUNTER — APPOINTMENT (OUTPATIENT)
Dept: PHYSICAL THERAPY | Age: 46
End: 2021-03-02
Payer: COMMERCIAL

## 2021-03-04 ENCOUNTER — APPOINTMENT (OUTPATIENT)
Dept: PHYSICAL THERAPY | Age: 46
End: 2021-03-04
Payer: COMMERCIAL

## 2021-03-04 ENCOUNTER — ANESTHESIA EVENT (OUTPATIENT)
Dept: SURGERY | Age: 46
End: 2021-03-04
Payer: COMMERCIAL

## 2021-03-05 ENCOUNTER — HOSPITAL ENCOUNTER (OUTPATIENT)
Age: 46
Setting detail: OUTPATIENT SURGERY
Discharge: HOME | End: 2021-03-05
Attending: ORTHOPAEDIC SURGERY | Admitting: ORTHOPAEDIC SURGERY
Payer: COMMERCIAL

## 2021-03-05 ENCOUNTER — ANESTHESIA (OUTPATIENT)
Dept: SURGERY | Age: 46
End: 2021-03-05
Payer: COMMERCIAL

## 2021-03-05 VITALS
HEART RATE: 70 BPM | OXYGEN SATURATION: 99 % | WEIGHT: 150 LBS | BODY MASS INDEX: 25.75 KG/M2 | DIASTOLIC BLOOD PRESSURE: 65 MMHG | SYSTOLIC BLOOD PRESSURE: 112 MMHG | TEMPERATURE: 97.7 F | RESPIRATION RATE: 14 BRPM

## 2021-03-05 PROCEDURE — 77030006788 HC BLD SAW OSC STRY -B: Performed by: ORTHOPAEDIC SURGERY

## 2021-03-05 PROCEDURE — 74011250636 HC RX REV CODE- 250/636: Performed by: NURSE ANESTHETIST, CERTIFIED REGISTERED

## 2021-03-05 PROCEDURE — 74011250637 HC RX REV CODE- 250/637: Performed by: ANESTHESIOLOGY

## 2021-03-05 PROCEDURE — 76210000020 HC REC RM PH II FIRST 0.5 HR: Performed by: ORTHOPAEDIC SURGERY

## 2021-03-05 PROCEDURE — 74011000250 HC RX REV CODE- 250: Performed by: NURSE ANESTHETIST, CERTIFIED REGISTERED

## 2021-03-05 PROCEDURE — 74011250636 HC RX REV CODE- 250/636: Performed by: PHYSICIAN ASSISTANT

## 2021-03-05 PROCEDURE — 76210000063 HC OR PH I REC FIRST 0.5 HR: Performed by: ORTHOPAEDIC SURGERY

## 2021-03-05 PROCEDURE — 77030002934 HC SUT MCRYL J&J -B: Performed by: ORTHOPAEDIC SURGERY

## 2021-03-05 PROCEDURE — 77030018986 HC SUT ETHBND4 J&J -B: Performed by: ORTHOPAEDIC SURGERY

## 2021-03-05 PROCEDURE — 77030033005 HC TBNG ARTHSC PMP STRY -B: Performed by: ORTHOPAEDIC SURGERY

## 2021-03-05 PROCEDURE — 77030040922 HC BLNKT HYPOTHRM STRY -A: Performed by: ANESTHESIOLOGY

## 2021-03-05 PROCEDURE — 77030002933 HC SUT MCRYL J&J -A: Performed by: ORTHOPAEDIC SURGERY

## 2021-03-05 PROCEDURE — 77030003602 HC NDL NRV BLK BBMI -B: Performed by: ANESTHESIOLOGY

## 2021-03-05 PROCEDURE — C1713 ANCHOR/SCREW BN/BN,TIS/BN: HCPCS | Performed by: ORTHOPAEDIC SURGERY

## 2021-03-05 PROCEDURE — C1762 CONN TISS, HUMAN(INC FASCIA): HCPCS | Performed by: ORTHOPAEDIC SURGERY

## 2021-03-05 PROCEDURE — 74011000250 HC RX REV CODE- 250: Performed by: ANESTHESIOLOGY

## 2021-03-05 PROCEDURE — 77030010509 HC AIRWY LMA MSK TELE -A: Performed by: ANESTHESIOLOGY

## 2021-03-05 PROCEDURE — 74011250636 HC RX REV CODE- 250/636: Performed by: ANESTHESIOLOGY

## 2021-03-05 PROCEDURE — 77030000032 HC CUF TRNQT ZIMM -B: Performed by: ORTHOPAEDIC SURGERY

## 2021-03-05 PROCEDURE — 77030002991 HC SUT QUILL SSPC -B: Performed by: ORTHOPAEDIC SURGERY

## 2021-03-05 PROCEDURE — 77030006891 HC BLD SHV RESECT STRY -B: Performed by: ORTHOPAEDIC SURGERY

## 2021-03-05 PROCEDURE — 76060000033 HC ANESTHESIA 1 TO 1.5 HR: Performed by: ORTHOPAEDIC SURGERY

## 2021-03-05 PROCEDURE — 77030028773 HC KT ACL RAP-PAC DISP S&N -C: Performed by: ORTHOPAEDIC SURGERY

## 2021-03-05 PROCEDURE — 76010010054 HC POST OP PAIN BLOCK: Performed by: ORTHOPAEDIC SURGERY

## 2021-03-05 PROCEDURE — 77030037713 HC CLOSR DEV INCIS ZIP STRY -B: Performed by: ORTHOPAEDIC SURGERY

## 2021-03-05 PROCEDURE — 2709999900 HC NON-CHARGEABLE SUPPLY: Performed by: ORTHOPAEDIC SURGERY

## 2021-03-05 PROCEDURE — 76942 ECHO GUIDE FOR BIOPSY: CPT | Performed by: ORTHOPAEDIC SURGERY

## 2021-03-05 PROCEDURE — 76010000161 HC OR TIME 1 TO 1.5 HR INTENSV-TIER 1: Performed by: ORTHOPAEDIC SURGERY

## 2021-03-05 PROCEDURE — 77030002966 HC SUT PDS J&J -A: Performed by: ORTHOPAEDIC SURGERY

## 2021-03-05 DEVICE — BIOSURE REGENSORB INTERFERENCE                                    SCREW 7 MM X 25MM
Type: IMPLANTABLE DEVICE | Site: KNEE | Status: FUNCTIONAL
Brand: BIOSURE

## 2021-03-05 DEVICE — GRAFT BNE L10XW25XH10MM BNE TEND BNE PRESHAPED: Type: IMPLANTABLE DEVICE | Site: KNEE | Status: FUNCTIONAL

## 2021-03-05 DEVICE — BIOSURE REGENSORB INTERFERENCE                                    SCREW 7 MM X 20MM
Type: IMPLANTABLE DEVICE | Site: KNEE | Status: FUNCTIONAL
Brand: BIOSURE

## 2021-03-05 RX ORDER — CEFAZOLIN SODIUM/WATER 2 G/20 ML
2 SYRINGE (ML) INTRAVENOUS ONCE
Status: COMPLETED | OUTPATIENT
Start: 2021-03-05 | End: 2021-03-05

## 2021-03-05 RX ORDER — BUPIVACAINE HYDROCHLORIDE AND EPINEPHRINE 2.5; 5 MG/ML; UG/ML
INJECTION, SOLUTION EPIDURAL; INFILTRATION; INTRACAUDAL; PERINEURAL
Status: COMPLETED | OUTPATIENT
Start: 2021-03-05 | End: 2021-03-05

## 2021-03-05 RX ORDER — MIDAZOLAM HYDROCHLORIDE 1 MG/ML
2 INJECTION, SOLUTION INTRAMUSCULAR; INTRAVENOUS
Status: DISCONTINUED | OUTPATIENT
Start: 2021-03-05 | End: 2021-03-05 | Stop reason: HOSPADM

## 2021-03-05 RX ORDER — OXYCODONE AND ACETAMINOPHEN 5; 325 MG/1; MG/1
1 TABLET ORAL AS NEEDED
Status: DISCONTINUED | OUTPATIENT
Start: 2021-03-05 | End: 2021-03-05 | Stop reason: HOSPADM

## 2021-03-05 RX ORDER — SODIUM CHLORIDE 0.9 % (FLUSH) 0.9 %
5-40 SYRINGE (ML) INJECTION EVERY 8 HOURS
Status: DISCONTINUED | OUTPATIENT
Start: 2021-03-05 | End: 2021-03-05 | Stop reason: HOSPADM

## 2021-03-05 RX ORDER — MIDAZOLAM HYDROCHLORIDE 1 MG/ML
2 INJECTION, SOLUTION INTRAMUSCULAR; INTRAVENOUS ONCE
Status: COMPLETED | OUTPATIENT
Start: 2021-03-05 | End: 2021-03-05

## 2021-03-05 RX ORDER — OXYCODONE HYDROCHLORIDE 5 MG/1
5 TABLET ORAL
Status: COMPLETED | OUTPATIENT
Start: 2021-03-05 | End: 2021-03-05

## 2021-03-05 RX ORDER — LIDOCAINE HYDROCHLORIDE 20 MG/ML
INJECTION, SOLUTION EPIDURAL; INFILTRATION; INTRACAUDAL; PERINEURAL
Status: COMPLETED | OUTPATIENT
Start: 2021-03-05 | End: 2021-03-05

## 2021-03-05 RX ORDER — SODIUM CHLORIDE 0.9 % (FLUSH) 0.9 %
5-40 SYRINGE (ML) INJECTION AS NEEDED
Status: DISCONTINUED | OUTPATIENT
Start: 2021-03-05 | End: 2021-03-05 | Stop reason: HOSPADM

## 2021-03-05 RX ORDER — HYDROMORPHONE HYDROCHLORIDE 1 MG/ML
0.5 INJECTION, SOLUTION INTRAMUSCULAR; INTRAVENOUS; SUBCUTANEOUS
Status: DISCONTINUED | OUTPATIENT
Start: 2021-03-05 | End: 2021-03-05 | Stop reason: HOSPADM

## 2021-03-05 RX ORDER — DEXAMETHASONE SODIUM PHOSPHATE 4 MG/ML
INJECTION, SOLUTION INTRA-ARTICULAR; INTRALESIONAL; INTRAMUSCULAR; INTRAVENOUS; SOFT TISSUE AS NEEDED
Status: DISCONTINUED | OUTPATIENT
Start: 2021-03-05 | End: 2021-03-05 | Stop reason: HOSPADM

## 2021-03-05 RX ORDER — BUPIVACAINE HYDROCHLORIDE AND EPINEPHRINE 5; 5 MG/ML; UG/ML
INJECTION, SOLUTION EPIDURAL; INTRACAUDAL; PERINEURAL
Status: COMPLETED | OUTPATIENT
Start: 2021-03-05 | End: 2021-03-05

## 2021-03-05 RX ORDER — LIDOCAINE HYDROCHLORIDE 10 MG/ML
0.1 INJECTION INFILTRATION; PERINEURAL AS NEEDED
Status: DISCONTINUED | OUTPATIENT
Start: 2021-03-05 | End: 2021-03-05 | Stop reason: HOSPADM

## 2021-03-05 RX ORDER — LIDOCAINE HYDROCHLORIDE 20 MG/ML
INJECTION, SOLUTION EPIDURAL; INFILTRATION; INTRACAUDAL; PERINEURAL AS NEEDED
Status: DISCONTINUED | OUTPATIENT
Start: 2021-03-05 | End: 2021-03-05 | Stop reason: HOSPADM

## 2021-03-05 RX ORDER — ONDANSETRON 2 MG/ML
INJECTION INTRAMUSCULAR; INTRAVENOUS AS NEEDED
Status: DISCONTINUED | OUTPATIENT
Start: 2021-03-05 | End: 2021-03-05 | Stop reason: HOSPADM

## 2021-03-05 RX ORDER — SODIUM CHLORIDE, SODIUM LACTATE, POTASSIUM CHLORIDE, CALCIUM CHLORIDE 600; 310; 30; 20 MG/100ML; MG/100ML; MG/100ML; MG/100ML
100 INJECTION, SOLUTION INTRAVENOUS CONTINUOUS
Status: DISCONTINUED | OUTPATIENT
Start: 2021-03-05 | End: 2021-03-05 | Stop reason: HOSPADM

## 2021-03-05 RX ORDER — DIPHENHYDRAMINE HYDROCHLORIDE 50 MG/ML
12.5 INJECTION, SOLUTION INTRAMUSCULAR; INTRAVENOUS
Status: DISCONTINUED | OUTPATIENT
Start: 2021-03-05 | End: 2021-03-05 | Stop reason: HOSPADM

## 2021-03-05 RX ORDER — SODIUM CHLORIDE, SODIUM LACTATE, POTASSIUM CHLORIDE, CALCIUM CHLORIDE 600; 310; 30; 20 MG/100ML; MG/100ML; MG/100ML; MG/100ML
75 INJECTION, SOLUTION INTRAVENOUS CONTINUOUS
Status: DISCONTINUED | OUTPATIENT
Start: 2021-03-05 | End: 2021-03-05 | Stop reason: HOSPADM

## 2021-03-05 RX ORDER — PROPOFOL 10 MG/ML
INJECTION, EMULSION INTRAVENOUS AS NEEDED
Status: DISCONTINUED | OUTPATIENT
Start: 2021-03-05 | End: 2021-03-05 | Stop reason: HOSPADM

## 2021-03-05 RX ORDER — FENTANYL CITRATE 50 UG/ML
INJECTION, SOLUTION INTRAMUSCULAR; INTRAVENOUS AS NEEDED
Status: DISCONTINUED | OUTPATIENT
Start: 2021-03-05 | End: 2021-03-05 | Stop reason: HOSPADM

## 2021-03-05 RX ORDER — FAMOTIDINE 20 MG/1
20 TABLET, FILM COATED ORAL ONCE
Status: COMPLETED | OUTPATIENT
Start: 2021-03-05 | End: 2021-03-05

## 2021-03-05 RX ORDER — SODIUM CHLORIDE 9 MG/ML
50 INJECTION, SOLUTION INTRAVENOUS CONTINUOUS
Status: DISCONTINUED | OUTPATIENT
Start: 2021-03-05 | End: 2021-03-05 | Stop reason: HOSPADM

## 2021-03-05 RX ORDER — FENTANYL CITRATE 50 UG/ML
25 INJECTION, SOLUTION INTRAMUSCULAR; INTRAVENOUS ONCE
Status: COMPLETED | OUTPATIENT
Start: 2021-03-05 | End: 2021-03-05

## 2021-03-05 RX ADMIN — PROPOFOL 200 MG: 10 INJECTION, EMULSION INTRAVENOUS at 08:26

## 2021-03-05 RX ADMIN — FAMOTIDINE 20 MG: 20 TABLET ORAL at 06:48

## 2021-03-05 RX ADMIN — BUPIVACAINE HYDROCHLORIDE AND EPINEPHRINE BITARTRATE 7.5 ML: 2.5; .005 INJECTION, SOLUTION EPIDURAL; INFILTRATION; INTRACAUDAL; PERINEURAL at 07:49

## 2021-03-05 RX ADMIN — HYDROMORPHONE HYDROCHLORIDE 0.5 MG: 1 INJECTION, SOLUTION INTRAMUSCULAR; INTRAVENOUS; SUBCUTANEOUS at 09:33

## 2021-03-05 RX ADMIN — PROPOFOL 20 MG: 10 INJECTION, EMULSION INTRAVENOUS at 07:46

## 2021-03-05 RX ADMIN — ONDANSETRON 4 MG: 2 INJECTION INTRAMUSCULAR; INTRAVENOUS at 09:02

## 2021-03-05 RX ADMIN — CEFAZOLIN 2 G: 1 INJECTION, POWDER, FOR SOLUTION INTRAVENOUS at 08:19

## 2021-03-05 RX ADMIN — SODIUM CHLORIDE, SODIUM LACTATE, POTASSIUM CHLORIDE, AND CALCIUM CHLORIDE 75 ML/HR: 600; 310; 30; 20 INJECTION, SOLUTION INTRAVENOUS at 06:48

## 2021-03-05 RX ADMIN — FENTANYL CITRATE 25 MCG: 50 INJECTION INTRAMUSCULAR; INTRAVENOUS at 08:54

## 2021-03-05 RX ADMIN — FENTANYL CITRATE 50 MCG: 50 INJECTION INTRAMUSCULAR; INTRAVENOUS at 08:25

## 2021-03-05 RX ADMIN — LIDOCAINE HYDROCHLORIDE 100 MG: 20 INJECTION, SOLUTION EPIDURAL; INFILTRATION; INTRACAUDAL; PERINEURAL at 08:26

## 2021-03-05 RX ADMIN — FENTANYL CITRATE 25 MCG: 50 INJECTION, SOLUTION INTRAMUSCULAR; INTRAVENOUS at 07:46

## 2021-03-05 RX ADMIN — BUPIVACAINE HYDROCHLORIDE AND EPINEPHRINE 7.5 ML: 5; 5 INJECTION, SOLUTION EPIDURAL; INTRACAUDAL; PERINEURAL at 07:49

## 2021-03-05 RX ADMIN — FENTANYL CITRATE 25 MCG: 50 INJECTION INTRAMUSCULAR; INTRAVENOUS at 08:43

## 2021-03-05 RX ADMIN — DEXAMETHASONE SODIUM PHOSPHATE 4 MG: 4 INJECTION, SOLUTION INTRAMUSCULAR; INTRAVENOUS at 09:02

## 2021-03-05 RX ADMIN — MIDAZOLAM 2 MG: 1 INJECTION INTRAMUSCULAR; INTRAVENOUS at 07:46

## 2021-03-05 RX ADMIN — LIDOCAINE HYDROCHLORIDE 5 ML: 20 INJECTION, SOLUTION EPIDURAL; INFILTRATION; INTRACAUDAL; PERINEURAL at 07:49

## 2021-03-05 RX ADMIN — OXYCODONE 5 MG: 5 TABLET ORAL at 09:34

## 2021-03-05 RX ADMIN — SODIUM CHLORIDE, SODIUM LACTATE, POTASSIUM CHLORIDE, AND CALCIUM CHLORIDE: 600; 310; 30; 20 INJECTION, SOLUTION INTRAVENOUS at 08:19

## 2021-03-05 NOTE — DISCHARGE INSTRUCTIONS
Post-Operative Instructions   For  Anterior Cruciate Ligament Reconstruction  Phone:  [phone]    1. Unless otherwise instructed, you may place as much weight as tolerated on the operated leg. Use crutches to help with ambulation. 2.  If you do not have an \"Iceman\" type cooling unit, for the first 48-72 hours following surgery, use ice on the knee every two hours (while awake) for 20-30 minutes at a time to help prevent swelling and lessen pain. If you have a cooling unit, follow the instructions given to you- continually as much as possible the first 48-72 hours, then 3-4 times a day for 4 weeks. Elevate leg. 3. You have been given a hinged brace. Keep the brace locked in a straight position at all times until you begin therapy. 4. You may remove the brace to shower and wrap the dressings to keep them dry. 5. Use any pain medication as instructed. You should take your pain medication as soon as you feel the anesthetic wearing off. Do not wait until you are in severe pain to begin taking your pain medication. 6. You may have some side effects from your pain medication. If you have nausea, try taking your medication with food. For itching, you may take over the counter Benadryl. 7. Begin therapy as ordered    8. You may have been given a prescription for Zofran or Phenergan. This medication is used for nausea and vomiting. You do not need to get this prescription filled unless you have a problem. 9. If you have a problem, please call [address] [place] [address] at [phone]    Tito April [address], P.AChoco Garcia 18 IF     Call your doctor if pain is NOT relieved by medication.      Excessive bleeding that does not stop after holding pressure over the area  · Temperature of 101 degrees F or above  · Excessive redness, swelling or bruising, and/ or green or yellow, smelly discharge from incision      TYPICAL SIDE EFFECTS OF PAIN MEDICATION:     Constipation: Drink lots of fluids. Over the counter stool softener if needed.    Nausea: Take pain medication with food. Call your doctor with persistent nausea. ACTIVITY  · As tolerated and as directed by your doctor. · Bathe or shower as directed by your doctor. DIET  · Day of surgery: Clear liquids until no nausea or vomiting; small portion, light diet [place] foods (ex: baked chicken, plain rice, grits, scrambled eggs, toast). Nothing greasy, fried or spicy today. · Advance to regular diet on second day, unless your doctor orders otherwise. · If nausea and vomiting continues, call your doctor. PAIN  · Take pain medication as directed by your doctor. · DO NOT take aspirin or blood thinners unless directed by your doctor. AFTER ANESTHESIA   · For the first 24 hours: DO NOT Drive, Drink alcoholic beverages, or Make important decisions. · Be aware of dizziness following anesthesia and while taking pain medication. DISCHARGE SUMMARY from Nurse    PATIENT INSTRUCTIONS:    After general anesthesia or intravenous sedation, for 24 hours or while taking prescription Narcotics:  · Limit your activities  · Do not drive and operate hazardous machinery  · Do not make important personal or business decisions  · Do  not drink alcoholic beverages  · If you have not urinated within 8 hours after discharge, please contact your surgeon on call. *  Please give a list of your current medications to your Primary Care Provider. *  Please update this list whenever your medications are discontinued, doses are      changed, or new medications (including over-the-counter products) are added. *  Please carry medication information at all times in case of emergency situations. Preventing Infection at Home  We care about preventing infection and avoiding the spread of germs - not only when you are in the hospital but also when you return home.  When you return home from the hospital, its important to take the following steps to help prevent infection and avoid spreading germs that could infect you and others. Ask everyone in your home to follow these guidelines, too. Clean Your Hands  · Clean your hands whenever your hands are visibly dirty, before you eat, before or after touching your mouth, nose or eyes, and before preparing food. Clean them after contact with body fluids, using the restroom, touching animals or changing diapers. · When washing hands, wet them with warm water and work up a lather. Rub hands for at least 15 seconds, then rinse them and pat them dry with a clean towel or paper towel. · When using hand sanitizers, it should take about 15 seconds to rub your hands dry. If not, you probably didnt apply enough . Cover Your Sneeze or Cough  Germs are released into the air whenever you sneeze or cough. To prevent the spread of infection:  · Turn away from other people before coughing or sneezing. · Cover your mouth or nose with a tissue when you cough or sneeze. Put the tissue in the trash. · If you dont have a tissue, cough or sneeze into your upper sleeve, not your hands. · Always clean your hands after coughing or sneezing. Care for Wounds  Your skin is your bodys first line of defense against germs, but an open wound leaves an easy way for germs to enter your body. To prevent infection:  · Clean your hands before and after changing wound dressings, and wear gloves to change dressings if recommended by your doctor. · Take special care with IV lines or other devices inserted into the body. If you must touch them, clean your hands first.  · Follow any specific instructions from your doctor to care for your wounds. Contact your doctor if you experience any signs of infection, such as fever or increased redness at the surgical or wound site.     Keep a Clean Home  · Clean or wipe commonly touched hard surfaces like door handles, sinks, tabletops, phones and TV remotes. · Use products labeled disinfectant to kill harmful bacteria and viruses. · Use a clean cloth or paper towel to clean and dry surfaces. Wiping surfaces with a dirty dishcloth, sponge or towel will only spread germs. · Never share toothbrushes, blackburn, drinking glasses, utensils, razor blades, face cloths or bath towels to avoid spreading germs. · Be sure that the linens that you sleep on are clean. · Keep pets away from wounds and wash your hands after touching pets, their toys or bedding. We care about you and your health. Remember, preventing infections is a team effort between you, your family, friends and health care providers. These are general instructions for a healthy lifestyle:    No smoking/ No tobacco products/ Avoid exposure to second hand smoke    Surgeon General's Warning:  Quitting smoking now greatly reduces serious risk to your health. Obesity, smoking, and sedentary lifestyle greatly increases your risk for illness    A healthy diet, regular physical exercise & weight monitoring are important for maintaining a healthy lifestyle    You may be retaining fluid if you have a history of heart failure or if you experience any of the following symptoms:  Weight gain of 3 pounds or more overnight or 5 pounds in a week, increased swelling in our hands or feet or shortness of breath while lying flat in bed. Please call your doctor as soon as you notice any of these symptoms; do not wait until your next office visit. Recognize signs and symptoms of STROKE:    F-face looks uneven    A-arms unable to move or move unevenly    S-speech slurred or non-existent    T-time-call 911 as soon as signs and symptoms begin-DO NOT go       Back to bed or wait to see if you get better-TIME IS BRAIN. Learning About Coronavirus [phone])  Coronavirus [phone]): Overview  What is coronavirus (COVID-19)? The coronavirus disease (COVID-19) is caused by a virus.  It is an illness that was first found in [place], [place], in December 2019. It has since spread worldwide. The virus can cause fever, cough, and trouble breathing. In severe cases, it can cause pneumonia and make it hard to breathe without help. It can cause death. Coronaviruses are a large group of viruses. They cause the common cold. They also cause more serious illnesses like Middle East respiratory syndrome (MERS) and severe acute respiratory syndrome (SARS). COVID-19 is caused by a novel coronavirus. That means it's a new type that has not been seen in people before. This virus spreads person-to-person through droplets from coughing and sneezing. It can also spread when you are close to someone who is infected. And it can spread when you touch something that has the virus on it, such as a doorknob or a tabletop. What can you do to protect yourself from coronavirus (COVID-19)? The best way to protect yourself from getting sick is to:  · Avoid areas where there is an outbreak. · Avoid contact with people who may be infected. · Wash your hands often with soap or alcohol-based hand sanitizers. · Avoid crowds and try to stay at least 6 feet away from other people. · Wash your hands often, especially after you cough or sneeze. Use soap and water, and scrub for at least 20 seconds. If soap and water aren't available, use an alcohol-based hand . · Avoid touching your mouth, nose, and eyes. What can you do to avoid spreading the virus to others? To help avoid spreading the virus to others:  · Cover your mouth with a tissue when you cough or sneeze. Then throw the tissue in the trash. · Use a disinfectant to clean things that you touch often. · Wear a cloth face cover if you have to go to public areas. · Stay home if you are sick or have been exposed to the virus. Don't go to school, work, or public areas. And don't use public transportation, ride-shares, or taxis unless you have no choice.   · If you are sick:  ? Leave your home only if you need to get medical care. But call the doctor's office first so they know you're coming. And wear a face cover. ? Wear the face cover whenever you're around other people. It can help stop the spread of the virus when you cough or sneeze. ? Clean and disinfect your home every day. Use household  and disinfectant wipes or sprays. Take special care to clean things that you grab with your hands. These include doorknobs, remote controls, phones, and handles on your refrigerator and microwave. And don't forget countertops, tabletops, bathrooms, and computer keyboards. When to call for help  [id number] anytime you think you may need emergency care. For example, call if:  · You have severe trouble breathing. (You can't talk at all.)  · You have constant chest pain or pressure. · You are severely dizzy or lightheaded. · You are confused or can't think clearly. · Your face and lips have a blue color. · You pass out (lose consciousness) or are very hard to wake up. Call your doctor now if you develop symptoms such as:  · Shortness of breath. · Fever. · Cough. If you need to get care, call ahead to the doctor's office for instructions before you go. Make sure you wear a face cover to prevent exposing other people to the virus. Where can you get the latest information? The following health organizations are tracking and studying this virus. Their websites contain the most up-to-date information. Theador Vera also learn what to do if you think you may have been exposed to the virus. · U.S. Centers for Disease Control and Prevention (CDC): The CDC provides updated news about the disease and travel advice. The website also tells you how to prevent the spread of infection. www.cdc.gov  · World Health Organization [facility]): WHO offers information about the virus outbreaks. WHO also has travel advice. www.who.int  Current as of:  May 8, 2020               Content Version: 12.5  © 9096-8664 [employer]. Care instructions adapted under license by [employer] (which disclaims liability or warranty for this information). If you have questions about a medical condition or this instruction, always ask your healthcare professional. [address] any warranty or liability for your use of this information.

## 2021-03-05 NOTE — ANESTHESIA POSTPROCEDURE EVALUATION
Procedure(s):  LEFT KNEE ARTHROSCOPY WITH ANTERIOR CRUCIATE LIGAMENT RECONSTRUCTION. general    Anesthesia Post Evaluation      Multimodal analgesia: multimodal analgesia used between 6 hours prior to anesthesia start to PACU discharge  Patient location during evaluation: bedside  Patient participation: complete - patient participated  Level of consciousness: awake  Pain management: adequate  Airway patency: patent  Anesthetic complications: no  Cardiovascular status: acceptable and stable  Respiratory status: acceptable and room air  Hydration status: acceptable  Post anesthesia nausea and vomiting:  none  Final Post Anesthesia Temperature Assessment:  Normothermia (36.0-37.5 degrees C)      INITIAL Post-op Vital signs:   Vitals Value Taken Time   /74 03/05/21 0944   Temp 36.1 °C (97 °F) 03/05/21 0925   Pulse 64 03/05/21 0945   Resp 14 03/05/21 0944   SpO2 100 % 03/05/21 0945   Vitals shown include unvalidated device data.

## 2021-03-05 NOTE — OP NOTES
[facility]  OPERATIVE REPORT    Name:  Debo Douglas  MR#:  [id number]  :  1975  ACCOUNT #:  [de-identified]  DATE OF SERVICE:  2021    PREOPERATIVE DIAGNOSES:  1. Anterior cruciate ligament tear. 2.  Chondromalacia of patella, left knee. POSTOPERATIVE DIAGNOSES:  1. Anterior cruciate ligament tear. 2.  Chondromalacia of patella, left knee. PROCEDURE PERFORMED:  1. Anterior cruciate ligament reconstruction - CPT Q471450. 2.  Abrasion arthroplasty of patella - CPT 63566, left knee. SURGEON:  Thong Echeverria MD    ASSISTANT:  Leonila Laureano. Chante Pope    ANESTHESIA:  General.    FLUIDS:  Crystalloid. COMPLICATIONS:  None. SPECIMENS REMOVED:  None. IMPLANTS:  Yes, see record. ESTIMATED BLOOD LOSS:  Minimal.    FINDINGS:  Exam under anesthesia revealed a 2+ Lachman's with a soft endpoint, 1 to 2+ pivot, full range of motion, no other instability. Intraoperative findings revealed a complete tear of the anterior cruciate ligament off its femoral insertion. The medial meniscus was thoroughly inspected and probed and found to be intact with no tearing. There was grade II, III, IV chondromalacia with a small area of grade IV change of the patella, 1.5 x 2 cm. DESCRIPTION OF PROCEDURE:  After informed consent, the patient was brought to the operating room and placed on the operating room table in the supine position. General anesthesia was administered without difficulty. Exam under anesthesia was performed with the aforementioned findings. Tourniquet was applied to the left upper thigh. Left knee and leg were prepped and draped in sterile fashion. Tourniquet was inflated. Standard inferomedial and inferolateral portals were made for scope and instruments. The knee was then arthroscoped in a sequential manner. The aforementioned findings were noted. Attention was directed to the patella. A chondroplasty of the patella was performed.   All loose cartilage flaps were removed. There were no sharp edges or unstable fragments after the chondroplasty. There was a small area of exposed bone and a contained defect and abrasion arthroplasty was performed down to bleeding subchondral bone without difficulty. Attention was directed to the intercondylar notch. The ACL stump was debrided. A notchplasty was performed all the way to the posterior periosteal fringe at the over-the-top position. A reference point was made just anterior to this at the 2 o'clock position for future femoral tunnel placement. A guide pin was placed from the proximal medial tibia up to the center of the old ACL footprint in line with the leading edge of the lateral meniscus just anterior to the medial tibial spine. This was over reamed. A femoral tunnel was made at the aforementioned reference point to a depth of 25 mm with the reamer. At the termination of the femoral tunnel preparation, there was a 1-2 mm posterior cortical rim with circumferential bone noted within the tunnel. The graft was passed up through the knee and affixed on the femoral side with a 7 x 25 mm bioabsorbable interference screw. This had excellent purchase. TUG testing revealed stable fixation at this interface. The graft was then tensioned to the tibial bone plug sutures and a posterior drawer was applied with the knee in 20 degrees of flexion. It was affixed on the tibial side with a 7 x 20 mm bioabsorbable interference screw. This had excellent purchase. The graft was then viewed arthroscopically. It had excellent tension and anatomic position. It did not impinge through full range of motion. Clinically, the patient had full motion. She had a negative Lachman's with a firm endpoint and negative pivot. The knee was thoroughly irrigated. Wounds were closed in layers. Sterile dressings and a brace were applied. The patient was taken to the recovery room in stable condition.     THERESA Morejon, assisted during the procedure. He was necessary for graft preparation, wound closure, and assistance with the major portions of the operation. His presence decreased the operative time and potential complication rate.       MD RIVKA [email or web address]  D:  03/05/2021 9:14  T:  03/05/2021 9:45  JOB #:  [id number]

## 2021-03-05 NOTE — ANESTHESIA PROCEDURE NOTES
Peripheral Block    Start time: 3/5/2021 7:46 AM  End time: 3/5/2021 7:49 AM  Performed by: Hilario Fonseca MD  Authorized by: Hilario Fonseca MD       Pre-procedure: Indications: at surgeon's request and post-op pain management    Preanesthetic Checklist: patient identified, risks and benefits discussed, site marked, timeout performed, anesthesia consent given and patient being monitored    Timeout Time: 07:46          Block Type:   Block Type:   Adductor canal  Laterality:  Left  Monitoring:  Standard ASA monitoring, continuous pulse ox, frequent vital sign checks, heart rate and oxygen  Injection Technique:  Single shot  Procedures: ultrasound guided and nerve stimulator    Prep: chlorhexidine    Location:  Mid thigh  Needle Type:  Stimuplex  Needle Gauge:  22 G  Needle Localization:  Nerve stimulator and ultrasound guidance  Motor Response comment:   Motor Response: minimal motor response >0.4 mA   Medication Injected:  Bupivacaine 0.25% -EPINEPHrine 1:200,000 (SENSORCAINE) mg injection, 7.5 mL  bupivacaine-EPINEPHrine (PF)(SENSORCAINE) 0.5%-1:200,000 mg injection, 7.5 mL  lidocaine (XYLOCAINE) Preserv-Free 2% injection, 5 mL  Med Admin Time: 3/5/2021 7:49 AM    Assessment:  Number of attempts:  1  Injection Assessment:  Local visualized surrounding nerve on ultrasound, negative aspiration for blood, no paresthesia, no intravascular symptoms, ultrasound image on chart and incremental injection every 5 mL  Patient tolerance:  Patient tolerated the procedure well with no immediate complications

## 2021-03-05 NOTE — ANESTHESIA PREPROCEDURE EVALUATION
Relevant Problems   No relevant active problems       Anesthetic History   No history of anesthetic complications            Review of Systems / Medical History  Patient summary reviewed and pertinent labs reviewed    Pulmonary  Within defined limits                 Neuro/Psych   Within defined limits           Cardiovascular                  Exercise tolerance: >4 METS     GI/Hepatic/Renal  Within defined limits              Endo/Other        Arthritis     Other Findings              Physical Exam    Airway  Mallampati: I  TM Distance: 4 - 6 cm  Neck ROM: normal range of motion   Mouth opening: Normal     Cardiovascular  Regular rate and rhythm,  S1 and S2 normal,  no murmur, click, rub, or gallop  Rhythm: regular  Rate: normal         Dental  No notable dental hx       Pulmonary  Breath sounds clear to auscultation               Abdominal  GI exam deferred       Other Findings            Anesthetic Plan    ASA: 2  Anesthesia type: general      Post-op pain plan if not by surgeon: peripheral nerve block single    Induction: Intravenous  Anesthetic plan and risks discussed with: Patient

## 2021-03-05 NOTE — PERIOP NOTES
PACU DISCHARGE NOTE  Vital signs stable, pain well controlled, alert and oriented times three or at baseline, no anesthetic complications. IV removed with catheter tip intact. Written and verbal discharge instructions given, including pain control, dressing care and follow up appointment. Spouse, Will verbalized understanding and signed discharge instructions. All questions answered prior to discharge. Dr Eagle Bearded okay to discharge at this time. Pt and all belongings taken via wheelchair and safely put in vehicle.

## 2021-03-05 NOTE — PROGRESS NOTES
Outpatient Surgery History and Physical:  Cesar Lantigua was seen and examined. CHIEF COMPLAINT:   Left knee pain. PE:     Visit Vitals  /76 (BP 1 Location: Left arm, BP Patient Position: Supine)   Pulse 67   Temp 97.8 °F (36.6 °C)   Resp 18   Wt 68 kg (150 lb)   SpO2 99%   BMI 25.75 kg/m²       Heart:   Regular rhythm      Lungs:  Are clear      Past Medical History:    Patient Active Problem List    Diagnosis    Anxiety    Allergic rhinitis       Surgical History:   Past Surgical History:   Procedure Laterality Date    HX BREAST AUGMENTATION      HX BREAST BIOPSY Right     Benign breast biopsy 5/15/12    HX  SECTION  2006    HX ORTHOPAEDIC Left     osteoarthritis surgery left thumb     HX SEPTOPLASTY  2019    IMPLANT BREAST SILICONE/EQ      ROXY BIOPSY BREAST STEREOTACTIC         Social History: Patient  reports that she has never smoked. She has never used smokeless tobacco. She reports current alcohol use. She reports that she does not use drugs. Family History:   Family History   Problem Relation Age of Onset    Breast Cancer Mother 43    Diabetes Mother     Hypertension Mother     Colon Cancer Neg Hx     Ovarian Cancer Neg Hx        Allergies: Reviewed per EMR  No Known Allergies    Medications:    No current facility-administered medications on file prior to encounter. Current Outpatient Medications on File Prior to Encounter   Medication Sig    CETIRIZINE HCL (ZYRTEC PO) Take 10 mg by mouth Daily (before breakfast). The surgery is planned for the left knee arthroscopy ACL reconstruction with allograft. History and physical has been reviewed. The patient has been examined. There have been no significant clinical changes since the completion of the originally dated History and Physical.  Patient identified by surgeon; surgical site was confirmed by patient and surgeon. The patient is here today for outpatient surgery.  I have examined the patient, no changes are noted in the patient's medical status. Necessity for the procedure/care is still present and the history and physical above is current. See the office notes for the full long term history of the problem. Please see the recent office notes for the musculoskeletal examination.     Signed By: THERESA Cano     March 5, 2021 7:14 AM

## 2021-03-08 ENCOUNTER — HOSPITAL ENCOUNTER (OUTPATIENT)
Dept: PHYSICAL THERAPY | Age: 46
Discharge: HOME | End: 2021-03-08
Payer: COMMERCIAL

## 2021-03-08 PROCEDURE — 97016 VASOPNEUMATIC DEVICE THERAPY: CPT | Performed by: PHYSICAL THERAPIST

## 2021-03-08 PROCEDURE — 97110 THERAPEUTIC EXERCISES: CPT | Performed by: PHYSICAL THERAPIST

## 2021-03-08 NOTE — PROGRESS NOTES
Channing He  : 1975  Primary: [facility] Janine*  Secondary:  [facility]  [address] , [address], [place] [address]  [phone]   [phone]      OUTPATIENT PHYSICAL THERAPY: Daily Treatment Note 3/8/2021  ICD-10: Treatment Diagnosis: (M62.81) muscle weakness; (R 26.2) Difficulty Walking; (M25.562) L knee pain  Precautions/Allergies:   Patient has no known allergies. TREATMENT PLAN:  Effective Dates: 3/8/2021 TO 2021 (90 days). Frequency/Duration: 2 times a week for 90 Day(s)     MEDICAL/REFERRING DIAGNOSIS:  Pain in left knee [M25.562]   DATE OF ONSET: DOI: 20  REFERRING PHYSICIAN: Marysol Escalante MD MD Orders: Evaluate and Treat  Return MD Appointment: None Scheduled Yet     Pre-treatment Symptoms/Complaints:  Patient reports she had  ACL recon surgery with Dr. Abbie Greer on 3/5/21. She is taking pain meds, but has not put weight on her L LE since prior to surgery. Pain: Initial: Pain Intensity 1: 5  Pain Location 1: Knee  Pain Orientation 1: Left  Post Session:  3-4/10 soreness and fatigue after C   Medications Last Reviewed:  3/8/2021    Updated Objective Findings:  3-8-21: L knee AROM on entry: 25deg-66deg; After treatment: 10deg-86deg   TREATMENT:   THERAPEUTIC EXERCISE: (45 minutes):  Exercises per grid below to improve mobility, strength, balance and coordination. Required moderate visual, verbal and tactile cues to promote proper body alignment, promote proper body posture, promote proper body mechanics and promote proper body breathing techniques. Progressed resistance, range, repetitions and complexity of movement as indicated.      Date:  21 Date:  21 Date:  21 Date:  21 Date:  21 Date:  21 Date:  21 Date:  3/8/21   Activity/Exercise Parameters Parameters Parameters        AP 10x          Ankle Circles 10x           Heel Slides        15x   QS 20x 5 sec W/ estim; 20x  15 x 5sec hold 15 x 5sec   20 x 5sec   SLR w/ eccentric lowering    20x 20x   10x   SLR 10 x 3sec W/estim; 20x  20x 20x   15x   SAQ 10 x 3sec W/estim; 20x  20x 20x      Bridging w/ add sq 10 x 3sec 10x GTB; 30x NB; 20x NB; 20x      LAQ 10 x 3sec W/estim; 20x  20x       HS/GS stretch w/ strap 3 x 20sec          Stdg GS stretch 2 x 20sec 5 x 30sec hold on slant board 5 x 30sec hold on slant board  5 x 30sec on board 5 x 30sec on board 5 x 30sec on board 5 x 30sec w/ strap   S/L hip ABD  10x GTB; 30x B 15x; RTB; 30x B RTB; 20x B   15x   Clams   GTB; 30x B RTB; 30x RTB; 20x B      Seated LAQ w/ band   RTB; 30x B  RTB; 20x RTB; 20x     Seated HSC w/ band   RTB; 30x B   RTB; 20x     Stdg TKE      RTB; 20x     Resisted side-stepping      RTB; thighs; 50' x 4 RTB; thighs; 50' x 6    Resisted diagonals       RTB; thighs; 50' x 4               Shuttle SL   75# R; 20x; 50# L; 20x    50#; 20x L    Shuttle B; w  Ball squeeze; w/GTB   75#; 20x each  50#; 30x B 62.5#; 40# 75#; 40x B    Kinesiotape for patellar support   Done        Gait- HS/TO seq  Demo HSG; 3 laps        Recumbent Stepper  L2; 10min Recumbent bike; 10min L1; 10min L2; 10min L2.5; 12min L3; 9min    MODALITIES: (15 minutes):      Vasopneumatic Cold Compression @ 34deg; med compression w/ B LE elevated on wedge to aide with decreasing pain, inflammation and swelling to improve L knee AROM     [employer] Portal  Access Code: [email or web address]   URL: [email or web address]   Date: 03/08/2021   Prepared by: Stephen Gagnon     Exercises   Supine Heel Slide with Small Ball - 10 reps - 2 sets - 2x daily - 7x weekly   Supine Quad Set - 10 reps - 2 sets - 2x daily - 7x weekly   Long Sitting Quad Set with Towel Roll Under Heel - 10 reps - 2 sets - 2x daily - 7x weekly   Sidelying Hip Abduction - 10 reps - 2 sets - 2x daily - 7x weekly   Small Range Straight Leg Raise - 10 reps - 2 sets - 2x daily - 7x weekly   Long Sitting Hamstring Stretch - 10 reps - 1 sets - 2x daily - 7x weekly       Treatment/Session Summary:    · Response to Treatment:  Increased pain and swelling in L knee today. Poor L quad contraction. Improved motion after exercises. · Communication/Consultation:  Brace locked out at 0deg. No signs of infection. HEP 2-3x daily. Ice 2-3x daily. · Equipment provided today:  HEP w/ instruction sheet  · Recommendations/Intent for next treatment session: Next visit will focus on L knee AROM, strength, balance and gait.      Total Treatment Billable Duration: 60 minutes ( 45 min therex; 15 min vaso)  PT Patient Time In/Time Out  Time In: 0815  Time Out: 0915  Danita Aschoff, PT    Future Appointments   Date Time Provider Daisha Taylor   3/9/2021  9:45 AM Murray Washington, PT [facility]   3/11/2021  9:45 AM Manjeet Dickinson, PT [facility]

## 2021-03-08 NOTE — THERAPY RECERTIFICATION
Shala Yeboah : 1975 Primary: [facility]* Secondary:  [facility] [address], [place] [address] [phone]   [phone] OUTPATIENT PHYSICAL THERAPY:Recertification  ICD-10: Treatment Diagnosis: (M62.81) muscle weakness; (R 26.2) Difficulty Walking; (M25.562) L knee pain Precautions/Allergies:  
Patient has no known allergies. TREATMENT PLAN: 
Effective Dates: 3/8/2021 TO 2021 (90 days). Frequency/Duration: 2-3 times a week for 90 Day(s) MEDICAL/REFERRING DIAGNOSIS: 
Pain in left knee [M25.562] DATE OF ONSET: DOI: 20; DOS: 3-5-21 REFERRING PHYSICIAN: Lin Sandifer, MD MD Orders: Evaluate and Treat Return MD Appointment: None Scheduled Yet ASSESSMENT:  Ms. Sonya Carreon has been seen in PT x 7 visits prior to today. She is now s/p L ACL reconstruction and abrasion arthroplasty of the patella on 3/5/21. Her pain level is a 5/10 on pain meds, and she scored a 96% disability rating on the LEFS. She was able to put ~ 10% weight on her L LE prior to leaving PT today. Her L knee AROM after treatment was 10-86deg. She was given an HEP for ROM and strength. Her brace is locked to zero, and she was educated on it's use and importance of wearing it at all times. Her outer surgical dressing was removed with minimal to no drainage and no signs of infection. I have extended her above POC and increased her frequency from 2x/week to 2-3x/week if needed if MD in agreement. PROBLEM LIST (Impacting functional limitations): 1. Decreased Strength 2. Decreased ADL/Functional Activities 3. Decreased Transfer Abilities 4. Decreased Ambulation Ability/Technique 5. Decreased Balance 6. Increased Pain 7. Decreased Flexibility/Joint Mobility 8. Edema/Girth 9. Decreased Knowledge of Precautions 10.  Decreased [place]ount with Home Exercise Program INTERVENTIONS PLANNED: (Treatment may consist of any combination of the following) 1. Balance Exercise 2. Gait Training 3. Heat 4. Home Exercise Program (HEP) 5. Manual Therapy 6. Neuromuscular Re-education/Strengthening 7. Therapeutic Exercise/Strengthening 8. Kinesiotaping TREATMENT PLAN: 
GOALS: (Goals have been discussed and agreed upon with patient.) Short-Term Functional Goals: Time Frame: 4-6 weeks (4-19-21) 1. Pt will be compliant and independent with HEP.----------------------------------------------------------MET 2. Pt will improve score on the LEFS to 40/80 to increase pt's overall functional mobility.-----------Ongoing 3. Pt will improve L knee AROM to 5-110deg to increase pt's ease with transfers and gait.-----------------Ongoing 4. Pt will be able to sit-stand from standard height without use of UE or compensatory weight shifting to rise.-------------Ongoing 5. Pt will be able to ambulate with a near normal gait pattern (HS/TO seq) for community distances with LRD or no AD. --------Ongoing 6. Pt will subjectively report decreased frequency (1x/night) of waking due to L knee pain.--------------------------Ongoing 7. Patient will be able to SLS on L x > 5sec. ---------------------------------------------Ongoing 8. Patient will perform an unassisted SLR with good form and control. -------------------------------Ongoing Discharge Goals: Time Frame: 8-12 weeks (6-8-21) 1. Pt will improve score on the LEFS to >60/80 to increase pt's overall functional mobility.------------------------Ongoing 2. Pt will improve L knee AROM to 0-125deg to increase pt's ease with transfers, gait and balance. -------------------------------Ongoing 3. Pt will be able to sit-stand from toilet height w/out use of UE for assistance to rise.--------------------------------------------Ongoing 4. Pt will be able to ascend/descend 6-8\" steps reciprocally  with use of a rail with good form and control. --------------------------Ongoing 5. Pt will ambulate with a normal gait pattern over level and unlevel surfaces with no AD to increase pt's overall functional mobility.-------------------------------------------------------------------------------------------------------------------------------------------------------Ongoing 6. Pt will lower fall risk by 1 point. ----------------------------------------------ongoing 7. Pt will be DC'd from PT to HEP.-------------------------------------Ongiong OUTCOME MEASURE:  
Tool Used: Lower Extremity Functional Scale (LEFS) Score:  Initial: 30/80=62% disability Most Recent: 3/80=96% disability. (Date: 3/8/21 ) Interpretation of Score: 20 questions each scored on a 5 point scale with 0 representing \"extreme difficulty or unable to perform\" and 4 representing \"no difficulty\". The lower the score, the greater the functional disability. 80/80 represents no disability. Minimal detectable change is 9 points. MEDICAL NECESSITY:  
· Patient is expected to demonstrate progress in strength, range of motion, balance and coordination to improve gait safety and quality while decreasing pain to increase pt's overall functional mobilty. . 
 
Total Duration: see daily treatment note PT Patient Time In/Time Out Time In: 4133 Time Out: 1616 Rehabilitation Potential For Stated Goals: Good Regarding Aneta Cuba's therapy, I certify that the treatment plan above will be carried out by a therapist or under their direction. Thank you for this referral, Danita Aschoff, PT Referring Physician Signature: Mitzy Giles MD _______________________________ Date _____________ PAIN/SUBJECTIVE:  
Initial: Pain Intensity 1: 5 Pain Location 1: Knee Pain Orientation 1: Left  Post Session:  4-5/10 after [address] HISTORY:  
History of Injury/Illness (Reason for Referral): 
Patient reports on 12/24/20, she slipped on a wet/wooden and fell, tearing/avulsing her L ACL and tearing her medial meniscus. She would like to prevent surgery. She is managing pain with Meloxicam, but admits fear of moving due to further injury. Past Medical History/Comorbidities: Ms. Jacob Drake  has a past medical history of Allergic rhinitis, Anxiety, Arthritis, Chicken pox, and Pneumonia. Ms. Jacob Drake  has a past surgical history that includes radha biopsy breast stereotactic (); hx  section (); hx breast biopsy (Right); hx breast augmentation; implant breast silicone/eq; hx orthopaedic (Left); and hx septoplasty (2019). Social History/Living Environment:  
  Patient lives in a 1 story home with her spouse with 3 steps to enter. Prior Level of Function/Work/Activity: 
Patient does not work at this time, however, prior to injury was active in her community, as well as, gym and yoga workouts. Dominant Side:  
      [facility] H2 Model Falls Risk Assessment Risk Factors: 
     (5)  History of Recent Falls [w/in 3 months] Ability to Rise from Chair: 
     (1)  Pushes up, successful in one attempt Falls Prevention Plan: No modifications necessary Total: (5 or greater = High Risk): 6  
 [facility] [address] [place] [address] States Patent #0,846,134. Federal Law prohibits the replication, distribution or use without written permission from [facility] Current Medications:   
  
Current Outpatient Medications:  
  meloxicam (MOBIC) 7.5 mg tablet, Take 7.5 mg by mouth daily. Holding for procedure, Disp: , Rfl:  
  CETIRIZINE HCL (ZYRTEC PO), Take 10 mg by mouth Daily (before breakfast). , Disp: , Rfl:   
Date Last Reviewed:  3/8/2021 Number of Personal Factors/Comorbidities that affect the Plan of Care: 0: LOW COMPLEXITY EXAMINATION:  
Observation/Orthostatic Postural Assessment:   
      Patient stands with minimal weight on L LE with partially flexed L knee. Palpation: Patient is moderately tender with palpation to medial L knee at joint line, as well as , posteriorly. Gait: Patient ambulates with decreased stance time on the L with a step-to gait pattern with L knee flexion during stance phase. Atrophy: moderate atrophy noted in L quads and GS complex compared to the R.  
Swelling/Girth (joint line):   L:38.5cm; Distal Quad: 40.5cm Shaun's Sign: Neg 
 
LE AROM/Strength DATE 
2/2/21 DATE 
3/8/21 Hip Flexion R: 5/5 
L: 3+/5 R: 5/5 
L: 3+/5 Hip Abduction  R: 5/5 
L: 3+/5 R: 5/5 
L: 3+/5 Hip Extension  R: 5/5 
L: 3/5 R: 5/5 
L: 3/5 Knee Flexion  R: 4+/5 L: 112deg/117deg; 3-/5 R: 4+/5 L: 66deg/86deg;  
3-/5 Knee Extension  R: 5/5 L:17deg/15deg/ 
12deg; 3-/5 R: 5/5 
L: 25dedg/10deg; 3-/5 Ankle Dorsiflexion  R: 5/5 
L:4/5 R: 5/5 
L:4/5 Ankle Plantarflexion  R:5/5 
L:3+/5 R:5/5 
L:3+/5 Flexibility: HS/Quads/GS Mod tightness throughout L side Mod tightness throughout Special Tests/Function: 
Stairs: Patient ascends/descends stairs with a step-to gait pattern. Balance:  Patient with good static balance as observed through activities in the gym. Dynamic: further testing required. Standing Heel Raises: NT due to L knee flexed Sit-stand: Uses B UE, as well as, compensatory weight shifting to the right to rise. Patellar position (static): Body Structures Involved: 1. Nerves 2. Bones 3. Joints 4. Muscles 5. Ligaments Body Functions Affected: 1. Sensory/Pain 2. Neuromusculoskeletal 
3. Movement Related Activities and Participation Affected: 1. Mobility 2. Self Care 3. Domestic Life 4. Community, Social and [place] Number of elements (examined above) that affect the Plan of Care: 1-2: LOW COMPLEXITY CLINICAL PRESENTATION:  
Presentation: Stable and uncomplicated: LOW COMPLEXITY CLINICAL DECISION MAKING:  
Use of outcome tool(s) and clinical judgement create a POC that gives a: Clear prediction of patient's progress: LOW COMPLEXITY

## 2021-03-09 ENCOUNTER — HOSPITAL ENCOUNTER (OUTPATIENT)
Dept: PHYSICAL THERAPY | Age: 46
Discharge: HOME | End: 2021-03-09
Payer: COMMERCIAL

## 2021-03-09 PROCEDURE — 97016 VASOPNEUMATIC DEVICE THERAPY: CPT | Performed by: PHYSICAL THERAPIST

## 2021-03-09 PROCEDURE — 97012 MECHANICAL TRACTION THERAPY: CPT | Performed by: PHYSICAL THERAPIST

## 2021-03-09 PROCEDURE — 97110 THERAPEUTIC EXERCISES: CPT | Performed by: PHYSICAL THERAPIST

## 2021-03-09 NOTE — PROGRESS NOTES
Nonah Soulier  : 1975  Primary: [facility]*  Secondary:  [facility]  [facility] [address], [place] [address]  [phone]   [phone]      OUTPATIENT PHYSICAL THERAPY: Daily Treatment Note 3/9/2021  ICD-10: Treatment Diagnosis: (M62.81) muscle weakness; (R 26.2) Difficulty Walking; (M25.562) L knee pain  Precautions/Allergies:   Patient has no known allergies. TREATMENT PLAN:  Effective Dates: 3/8/2021 TO 2021 (90 days). Frequency/Duration: 2 times a week for 90 Day(s)     MEDICAL/REFERRING DIAGNOSIS:  Pain in left knee [M25.562]   DATE OF ONSET: DOI: 20  REFERRING PHYSICIAN: Jada Morgan MD MD Orders: Evaluate and Treat  Return MD Appointment: None Scheduled Yet     Pre-treatment Symptoms/Complaints:  Patient is frustrated with immobility. She reports compliance with HEP. Pain: Initial: Pain Intensity 1: 5  Pain Location 1: Knee  Pain Orientation 1: Left  Post Session:  4/10 soreness and fatigue after [facility]   Medications Last Reviewed:  3/9/2021    Updated Objective Findings:  3-9-21: L knee AROM on entry: 16deg-70deg; After treatment: 6deg-100deg   TREATMENT:   THERAPEUTIC EXERCISE: (45 minutes):  Exercises per grid below to improve mobility, strength, balance and coordination. Required moderate visual, verbal and tactile cues to promote proper body alignment, promote proper body posture, promote proper body mechanics and promote proper body breathing techniques. Progressed resistance, range, repetitions and complexity of movement as indicated.      Date:  21 Date:  21 Date:  21 Date:  21 Date:  21 Date:  21 Date:  21 Date:  3/8/21 Date:  3/9/21   Activity/Exercise Parameters Parameters Parameters         AP 10x           Ankle Circles 10x            Heel Slides        15x 20x   QS 20x 5 sec W/ estim; 20x  15 x 5sec hold 15 x 5sec   20 x 5sec 20 x 5sec   SLR w/ eccentric lowering    20x 20x   10x 20x   SLR 10 x 3sec W/estim; 20x  20x 20x   15x 20x   SAQ 10 x 3sec W/estim; 20x  20x 20x       Bridging w/ add sq 10 x 3sec 10x GTB; 30x NB; 20x NB; 20x       LAQ 10 x 3sec W/estim; 20x  20x        HS/GS stretch w/ strap 3 x 20sec           Stdg GS stretch 2 x 20sec 5 x 30sec hold on slant board 5 x 30sec hold on slant board  5 x 30sec on board 5 x 30sec on board 5 x 30sec on board 5 x 30sec w/ strap 5 x 30sec w/ strap   S/L hip ABD  10x GTB; 30x B 15x; RTB; 30x B RTB; 20x B   15x 20x   Clams   GTB; 30x B RTB; 30x RTB; 20x B       Seated LAQ w/ band   RTB; 30x B  RTB; 20x RTB; 20x      Seated HSC w/ band   RTB; 30x B   RTB; 20x      Stdg TKE      RTB; 20x      Resisted side-stepping      RTB; thighs; 50' x 4 RTB; thighs; 50' x 6     Resisted diagonals       RTB; thighs; 50' x 4                 Shuttle SL   75# R; 20x; 50# L; 20x    50#; 20x L     Shuttle B; w  Ball squeeze; w/GTB   75#; 20x each  50#; 30x B 62.5#; 40# 75#; 40x B     Weight shifting side-side         20x B   Weight shifting tandem B         20x B   Heel raises         20x B   Toe Raises         20x B   Stdg hip ABD         20x B               Kinesiotape for patellar support   Done      Quad facilitation   Gait- HS/TO seq  Demo HSG; 3 laps      In // bars and in clinic w/ 1 crutch;   Recumbent Stepper  L2; 10min Recumbent bike; 10min L1; 10min L2; 10min L2.5; 12min L3; 9min     MODALITIES: (15 minutes):      Vasopneumatic Cold Compression @ 34deg; med compression w/ B LE elevated on wedge to aide with decreasing pain, inflammation and swelling to improve L knee AROM     [employer] [facility] Portal  Access Code: [email or web address]   URL: [email or web address]   Date: 03/08/2021   Prepared by: Genevieve Henley     Exercises   Supine Heel Slide with Small Ball - 10 reps - 2 sets - 2x daily - 7x weekly   Supine Quad Set - 10 reps - 2 sets - 2x daily - 7x weekly   Long Sitting Quad Set with Towel Roll Under Heel - 10 reps - 2 sets - 2x daily - 7x weekly   Sidelying Hip Abduction - 10 reps - 2 sets - 2x daily - 7x weekly   Small Range Straight Leg Raise - 10 reps - 2 sets - 2x daily - 7x weekly   Long Sitting Hamstring Stretch - 10 reps - 1 sets - 2x daily - 7x weekly       Treatment/Session Summary:    · Response to Treatment:  Improved L knee AROM as noted above. L quad remains in shutdown, but overall function is improving. She was able to put ~ 50% WB through L LE today. · Communication/Consultation:  Brace locked out at 0deg. No signs of infection. HEP 2-3x daily. Ice 2-3x daily. · Equipment provided today:  HEP w/ instruction sheet  · Recommendations/Intent for next treatment session: Next visit will focus on L knee AROM, strength, balance and gait.      Total Treatment Billable Duration: 60 minutes ( 45 min therex; 15 min vaso)  PT Patient Time In/Time Out  Time In: 0945  Time Out: [address], PT    Future Appointments   Date Time Provider Daisha Vazquez   3/11/2021  9:45 AM Murray Felix, PT [facility]   3/15/2021  9:00 AM Murray Felix, PT [facility]   3/17/2021  9:45 AM Murray Felix, PT [facility]   3/22/2021  9:45 AM Murray Felix, PT [facility]   3/24/2021  9:45 AM Murray Felix, PT [facility]   4/6/2021 10:30 AM Murray Felix, PT [facility]   4/8/2021  8:15 AM Murray Felix, PT [facility]   4/13/2021 10:30 AM Murray Felix, PT [facility]   4/15/2021 10:30 AM Murray Felix, PT [facility]   4/19/2021 10:30 AM Murray Felix, PT [facility]   4/22/2021 10:30 AM Murray Felix, PT [facility]   4/27/2021 10:30 AM Murray Felix, PT [facility]   4/29/2021 10:30 AM Manjeet Dickinson, PT [facility]

## 2021-03-11 ENCOUNTER — HOSPITAL ENCOUNTER (OUTPATIENT)
Dept: PHYSICAL THERAPY | Age: 46
Discharge: HOME | End: 2021-03-11
Payer: COMMERCIAL

## 2021-03-11 PROCEDURE — 97016 VASOPNEUMATIC DEVICE THERAPY: CPT | Performed by: PHYSICAL THERAPIST

## 2021-03-11 PROCEDURE — 97110 THERAPEUTIC EXERCISES: CPT | Performed by: PHYSICAL THERAPIST

## 2021-03-11 NOTE — PROGRESS NOTES
Toshia Bianchi  : 1975  Primary: [facility] [employer] [facility]  Secondary:  [facility] at [place] [address], [address], [place]  [phone]   [phone]      OUTPATIENT PHYSICAL THERAPY: Daily Treatment Note 3/11/2021  ICD-10: Treatment Diagnosis: (M62.81) muscle weakness; (R 26.2) Difficulty Walking; (M25.562) L knee pain  Precautions/Allergies:   Patient has no known allergies. TREATMENT PLAN:  Effective Dates: 3/8/2021 TO 2021 (90 days). Frequency/Duration: 2 times a week for 90 Day(s)     MEDICAL/REFERRING DIAGNOSIS:  Pain in left knee [M25.562]   DATE OF ONSET: DOI: 20  REFERRING PHYSICIAN: Ethel Hunter MD MD Orders: Evaluate and Treat  Return MD Appointment: None Scheduled Yet     Pre-treatment Symptoms/Complaints:  Patient reports L knee stiffness today. States she's doing ok with pain. Pain: Initial: Pain Intensity 1: 3  Pain Location 1: Knee  Pain Orientation 1: Left  Post Session:  3-4/10 soreness and fatigue after VCC   Medications Last Reviewed:  3/11/2021    Updated Objective Findings:  3-11-21: L knee AROM on entry: 12deg-80deg; After treatment: 4deg-98deg   TREATMENT:   THERAPEUTIC EXERCISE: (45 minutes):  Exercises per grid below to improve mobility, strength, balance and coordination. Required moderate visual, verbal and tactile cues to promote proper body alignment, promote proper body posture, promote proper body mechanics and promote proper body breathing techniques. Progressed resistance, range, repetitions and complexity of movement as indicated.      Date:  21 Date:  21 Date:  21 Date:  21 Date:  21 Date:  3/8/21 Date:  3/9/21 Date:  3/11/21   Activity/Exercise Parameters          AP           Ankle Circles           Heel Slides      15x 20x 20x   QS  15 x 5sec hold 15 x 5sec   20 x 5sec 20 x 5sec 20 x 5sec   SLR w/ eccentric lowering  20x 20x   10x 20x 20x   SLR 20x 20x   15x 20x 20x   SAQ  20x 20x        Bridging w/ add sq GTB; 30x NB; 20x NB; 20x     20x   LAQ  20x         HS/GS stretch w/ strap           Stdg GS stretch 5 x 30sec hold on slant board  5 x 30sec on board 5 x 30sec on board 5 x 30sec on board 5 x 30sec w/ strap 5 x 30sec w/ strap 5 x 30sec w/ strap   S/L hip ABD GTB; 30x B 15x; RTB; 30x B RTB; 20x B   15x 20x 20x   Clams GTB; 30x B RTB; 30x RTB; 20x B        Seated LAQ w/ band RTB; 30x B  RTB; 20x RTB; 20x       Seated HSC w/ band RTB; 30x B   RTB; 20x       Stdg TKE    RTB; 20x       Resisted side-stepping    RTB; thighs; 50' x 4 RTB; thighs; 50' x 6      Resisted diagonals     RTB; thighs; 50' x 4      Hamstring digs into ball        20x   Shuttle SL 75# R; 20x; 50# L; 20x    50#; 20x L      Shuttle B; w  [place] squeeze; w/GTB 75#; 20x each  50#; 30x B 62.5#; 40# 75#; 40x B      Weight shifting side-side       20x B    Weight shifting tandem B       20x B    Heel raises       20x B 10x   Toe Raises       20x B    Stdg hip ABD       20x B               Kinesiotape for patellar support Done      Quad facilitation    Gait- HS/TO seq HSG; 3 laps      In // bars and in clinic w/ 1 crutch; 1 crutch in clinic   Recumbent Stepper Recumbent bike; 10min L1; 10min L2; 10min L2.5; 12min L3; 9min      MODALITIES: (15 minutes):      Vasopneumatic Cold Compression @ 34deg; med compression w/ B LE elevated on wedge to aide with decreasing pain, inflammation and swelling to improve L knee AROM     [employer] Portal  Access Code: [email or web address]   URL: [email or web address]   Date: 03/08/2021   Prepared by: Sherri Coko     Exercises   Supine Heel Slide with Small Ball - 10 reps - 2 sets - 2x daily - 7x weekly   Supine Quad Set - 10 reps - 2 sets - 2x daily - 7x weekly   Long Sitting Quad Set with Towel Roll Under Heel - 10 reps - 2 sets - 2x daily - 7x weekly   Sidelying Hip Abduction - 10 reps - 2 sets - 2x daily - 7x weekly   Small Range Straight Leg Raise - 10 reps - 2 sets - 2x daily - 7x weekly   Long Sitting Hamstring Stretch - 10 reps - 1 sets - 2x daily - 7x weekly       Treatment/Session Summary:    · Response to Treatment:  L quad remains very weak, however, is firing better than earlier in the week. Improved gait quality and control. · Communication/Consultation:   Patient encouraged to perform more gait and WB activities at home. Brace locked out at 0deg. No signs of infection. HEP 2-3x daily. Ice 2-3x daily. · Equipment provided today:  HEP w/ instruction sheet  · Recommendations/Intent for next treatment session: Next visit will focus on L knee AROM, strength, balance and gait. Consider NMES ESTIM next visit.      Total Treatment Billable Duration: 60 minutes ( 45 min therex; 15 min vaso)  PT Patient Time In/Time Out  Time In: 0945  Time Out: 500 Jc Singer, PT    Future Appointments   Date Time Provider Daisha Vazquez   3/15/2021  9:00 AM Marley Dinh, PT [facility]   3/17/2021  9:45 AM Marley Dinh, PT [facility]   3/22/2021  9:45 AM Marley Dinh, PT [facility]   3/24/2021  9:45 AM Marley Dinh, PT [facility]   4/6/2021 10:30 AM Marley Dinh, PT [facility]   4/8/2021  8:15 AM Marley Dinh, PT [facility]   4/13/2021 10:30 AM Marley Dinh, PT [facility]   4/15/2021 10:30 AM Marley Dinh, PT [facility]   4/19/2021 10:30 AM Marley Dinh, PT [facility]   4/22/2021 10:30 AM Marley Dinh, PT [facility]   4/27/2021 10:30 AM Marley Dinh, PT [facility]   4/29/2021 10:30 AM Max Dickinson, PT [facility]

## 2021-03-15 ENCOUNTER — HOSPITAL ENCOUNTER (OUTPATIENT)
Dept: PHYSICAL THERAPY | Age: 46
Discharge: HOME | End: 2021-03-15
Payer: COMMERCIAL

## 2021-03-15 PROCEDURE — 97116 GAIT TRAINING THERAPY: CPT | Performed by: PHYSICAL THERAPIST

## 2021-03-15 PROCEDURE — 97016 VASOPNEUMATIC DEVICE THERAPY: CPT | Performed by: PHYSICAL THERAPIST

## 2021-03-15 PROCEDURE — 97110 THERAPEUTIC EXERCISES: CPT | Performed by: PHYSICAL THERAPIST

## 2021-03-15 NOTE — PROGRESS NOTES
Martina Abiola  : 1975  Primary: [facility] [employer] [facility] [facility]  [facility]:  [facility] [address], [place] [address]  [phone]   [phone]      OUTPATIENT PHYSICAL THERAPY: Daily Treatment Note 3/15/2021  ICD-10: Treatment Diagnosis: (M62.81) muscle weakness; (R 26.2) Difficulty Walking; (M25.562) L knee pain  Precautions/Allergies:   Patient has no known allergies. TREATMENT PLAN:  Effective Dates: 3/8/2021 TO 2021 (90 days). Frequency/Duration: 2 times a week for 90 Day(s)     MEDICAL/REFERRING DIAGNOSIS:  Pain in left knee [M25.562]   DATE OF ONSET: DOI: 20  REFERRING PHYSICIAN: Dilia Piper MD MD Orders: Evaluate and Treat  Return MD Appointment: None Scheduled Yet     Pre-treatment Symptoms/Complaints:  Patient reports she has gone around her house some without her brace donned. She was cautioned against this until she is able to perform a SLR without extensor lag and L SLS x 5-10sec. She reports continued compliance with her HEP. Pain: Initial: Pain Intensity 1: 3  Pain Location 1: Knee  Pain Orientation 1: Left  Post Session:  3/10 soreness and fatigue after VCC   Medications Last Reviewed:  3/15/2021    Updated Objective Findings:  3-15-21: L knee AROM on entry: 10deg-90deg; After treatment: 3deg-102deg Honeycomb dressing removed today at 10days post-op. No signs of infection   TREATMENT:   THERAPEUTIC EXERCISE: (60 minutes):  Exercises per grid below to improve mobility, strength, balance and coordination. Required moderate visual, verbal and tactile cues to promote proper body alignment, promote proper body posture, promote proper body mechanics and promote proper body breathing techniques. Progressed resistance, range, repetitions and complexity of movement as indicated.      Date:  21 Date:  21 Date:  21 Date:  21 Date:  21 Date:  3/8/21 Date:  3/9/21 Date:  3/11/21 Date:  3/15/21   Activity/Exercise Parameters           AP            Ankle Circles            Heel Slides      15x 20x 20x W/ ball and toe tap; 50x   QS  15 x 5sec hold 15 x 5sec   20 x 5sec 20 x 5sec 20 x 5sec 30 x 10sec w/ estim   SLR w/ eccentric lowering  20x 20x   10x 20x 20x 30 x 10sec w/estim   SLR  20x 20x   15x 20x 20x 10x   SAQ  20x 20x         Bridging w/ add sq GTB; 30x NB; 20x NB; 20x     20x 10x   LAQ  20x          HS/GS stretch w/ strap            Stdg GS stretch 5 x 30sec hold on slant board  5 x 30sec on board 5 x 30sec on board 5 x 30sec on board 5 x 30sec w/ strap 5 x 30sec w/ strap 5 x 30sec w/ strap 5 x 30sec w/ strap and 5 x 30sec on slant board   S/L hip ABD GTB; 30x B 15x; RTB; 30x B RTB; 20x B   15x 20x 20x 30 x 10sec w/ estim   Clams GTB; 30x B RTB; 30x RTB; 20x B         Seated LAQ w/ band RTB; 30x B  RTB; 20x RTB; 20x        Seated HSC w/ band RTB; 30x B   RTB; 20x        Stdg TKE    RTB; 20x        Resisted side-stepping    RTB; thighs; 50' x 4 RTB; thighs; 50' x 6       Resisted diagonals     RTB; thighs; 50' x 4       Hamstring digs into ball        20x    Shuttle SL 75# R; 20x; 50# L; 20x    50#; 20x L       Shuttle B; w  Louise Annabel squeeze; w/GTB 75#; 20x each  50#; 30x B 62.5#; 40# 75#; 40x B       Weight shifting side-side       20x B     Weight shifting tandem B       20x B     Heel raises       20x B 10x 30x B   Toe Raises       20x B  30x B   Stdg hip ABD       20x B  10x B   stdg SL heel raises L         5x   stdg SLS         5 x 5-10sec B   Sit-stand from varying heights working on equal WB B         20x   Sit-stand with L LE bias         5x   Kinesiotape for patellar support Done      Quad facilitation     Gait- HS/TO seq HSG; 3 laps      In // bars and in clinic w/ 1 crutch; 1 crutch in clinic See below   Recumbent Stepper Recumbent bike; 10min L1; 10min L2; 10min L2.5; 12min L3; 9min         GAIT TRAINING: (15 minutes): working on HS/TO sequencing using 1 crutch w/out brace donned in clinic only. Also ambulating with brace locked into full extension w/out crutches. MODALITIES: (15 minutes):  Vasopneumatic Cold Compression @ 34deg; med compression w/ B LE elevated on wedge to aide with decreasing pain, inflammation and swelling to improve L knee AROM     [employer] Portal  Access Code: [email or web address]   URL: [email or web address]   Date: 03/08/2021   Prepared by: Thad Sultana     Exercises   Supine Heel Slide with Small Ball - 10 reps - 2 sets - 2x daily - 7x weekly   Supine Quad Set - 10 reps - 2 sets - 2x daily - 7x weekly   Long Sitting Quad Set with Towel Roll Under Heel - 10 reps - 2 sets - 2x daily - 7x weekly   Sidelying Hip Abduction - 10 reps - 2 sets - 2x daily - 7x weekly   Small Range Straight Leg Raise - 10 reps - 2 sets - 2x daily - 7x weekly   Long Sitting Hamstring Stretch - 10 reps - 1 sets - 2x daily - 7x weekly       Treatment/Session Summary:    · Response to Treatment:  Improving L quad contraction and L knee AROM as noted above. She was made aware of how important it is for her to wear her brace locked into extension until her L LE is stronger and she has more control. Her gait with 1 crutch and w/out brace is near normal. She did well with closed chain activities today. · Communication/Consultation:   Patient encouraged to perform more gait and WB activities at home. Brace locked out at 0deg. No signs of infection. HEP 2-3x daily. Ice 2-3x daily. · Equipment provided today:  HEP w/ instruction sheet  · Recommendations/Intent for next treatment session: Next visit will focus on L knee AROM, strength, balance and gait.  Consider kinesiotape again next visit    Total Treatment Billable Duration: 90 minutes (60 min therex; 15 min gait training; 15 min vaso)  PT Patient Time In/Time Out  Time In: 0900  Time Out: 100 [facility], PT    Future Appointments   Date Time Provider Daisha Vazquez   3/17/2021  9:45 AM Danny Bee PT [facility]   3/22/2021  9:45 AM Arvilla Carmelo, PT [facility]   3/24/2021  9:45 AM Arvilla Carmelo, PT [facility]   4/6/2021 10:30 AM Arvilla Carmelo, PT [facility]   4/8/2021  8:15 AM Arvilla Carmelo, PT [facility]   4/13/2021 10:30 AM Arvilla Carmelo, PT [facility]   4/15/2021 10:30 AM Arvilla Carmelo, PT [facility]   4/19/2021 10:30 AM Arvilla Carmelo, PT [facility]   4/22/2021 10:30 AM Arvilla Carmelo, PT [facility]   4/27/2021 10:30 AM Arvilla Carmelo, PT [facility]   4/29/2021 10:30 AM Cheri Dickinson, PT [facility]

## 2021-03-17 ENCOUNTER — HOSPITAL ENCOUNTER (OUTPATIENT)
Dept: PHYSICAL THERAPY | Age: 46
Discharge: HOME | End: 2021-03-17
Payer: COMMERCIAL

## 2021-03-17 PROCEDURE — 97016 VASOPNEUMATIC DEVICE THERAPY: CPT | Performed by: PHYSICAL THERAPIST

## 2021-03-17 PROCEDURE — 97110 THERAPEUTIC EXERCISES: CPT | Performed by: PHYSICAL THERAPIST

## 2021-03-17 NOTE — PROGRESS NOTES
Jodie Jaquez  : 1975  Primary: [employer] [facility]  Secondary:  [facility], [address], [place] [address]  [phone]   [phone]      OUTPATIENT PHYSICAL THERAPY: Daily Treatment Note 3/17/2021  ICD-10: Treatment Diagnosis: (M62.81) muscle weakness; (R 26.2) Difficulty Walking; (M25.562) L knee pain  Precautions/Allergies:   Patient has no known allergies. TREATMENT PLAN:  Effective Dates: 3/8/2021 TO 2021 (90 days). Frequency/Duration: 2 times a week for 90 Day(s)     MEDICAL/REFERRING DIAGNOSIS:  Pain in left knee [M25.562]   DATE OF ONSET: DOI: 20  REFERRING PHYSICIAN: Charisse Bansal MD MD Orders: Evaluate and Treat  Return MD Appointment: None Scheduled Yet     Pre-treatment Symptoms/Complaints:  Patient sore after last PT visit. She reports daily compliance with HEP. Patient c/o pain under her patella. Pain: Initial: Pain Intensity 1: 3  Pain Location 1: Knee  Pain Orientation 1: Left  Post Session:  2-3/10 soreness and fatigue after VCC   Medications Last Reviewed:  3/17/2021    Updated Objective Findings:  3-17-21: L knee AROM on entry: 10deg-90deg; After treatment: 5deg-102deg Honeycomb dressing removed today at 10days post-op. No signs of infection   TREATMENT:   THERAPEUTIC EXERCISE: (45 minutes):  Exercises per grid below to improve mobility, strength, balance and coordination. Required moderate visual, verbal and tactile cues to promote proper body alignment, promote proper body posture, promote proper body mechanics and promote proper body breathing techniques. Progressed resistance, range, repetitions and complexity of movement as indicated.      Date:  21 Date:  21 Date:  21 Date:  21 Date:  21 Date:  3/8/21 Date:  3/9/21 Date:  3/11/21 Date:  3/15/21 Date:  3/17/21   Activity/Exercise Parameters            AP             Ankle Circles             Heel Slides      15x 20x 20x W/ ball and toe tap; 50x W/ toe tap; 20x   QS  15 x 5sec hold 15 x 5sec   20 x 5sec 20 x 5sec 20 x 5sec 30 x 10sec w/ estim 10x   SLR w/ eccentric lowering  20x 20x   10x 20x 20x 30 x 10sec w/estim    SLR  20x 20x   15x 20x 20x 10x 20x   SAQ  20x 20x          Bridging w/ add sq GTB; 30x NB; 20x NB; 20x     20x 10x    LAQ  20x           HS/GS stretch w/ strap             Stdg GS stretch 5 x 30sec hold on slant board  5 x 30sec on board 5 x 30sec on board 5 x 30sec on board 5 x 30sec w/ strap 5 x 30sec w/ strap 5 x 30sec w/ strap 5 x 30sec w/ strap and 5 x 30sec on slant board 5 x 30sec w/ strap and 5 x 30sec on slant board   S/L hip ABD GTB; 30x B 15x; RTB; 30x B RTB; 20x B   15x 20x 20x 30 x 10sec w/ estim    Clams GTB; 30x B RTB; 30x RTB; 20x B          Seated LAQ w/ band RTB; 30x B  RTB; 20x RTB; 20x         Seated HSC w/ band RTB; 30x B   RTB; 20x         Stdg TKE    RTB; 20x         Resisted side-stepping    RTB; thighs; 50' x 4 RTB; thighs; 50' x 6        Resisted diagonals     RTB; thighs; 50' x 4        Hamstring digs into ball        20x     Shuttle SL 75# R; 20x; 50# L; 20x    50#; 20x L        Shuttle B; w  [place] squeeze; w/GTB 75#; 20x each  50#; 30x B 62.5#; 40# 75#; 40x B     50#; 30x   Weight shifting side-side       20x B      Weight shifting tandem B       20x B      Heel raises       20x B 10x 30x B 30x B   Toe Raises       20x B  30x B 30x B   Stdg hip ABD       20x B  10x B 10x B   stdg SL heel raises L         5x 5x   stdg SLS         5 x 5-10sec B    Sit-stand from varying heights working on equal WB B         20x    Sit-stand with L LE bias         5x    Kinesiotape for patellar support Done      Quad facilitation      Gait- HS/TO seq HSG; 3 laps      In // bars and in clinic w/ 1 crutch; 1 crutch in clinic See below W/ brace and w/out crutches; 200'   Recumbent Stepper Recumbent bike; 10min L1; 10min L2; 10min L2.5; 12min L3; 9min     L1; 10min     GAIT TRAINING: (0 minutes): working on HS/TO sequencing using 1 crutch w/out brace donned in clinic only. Also ambulating with brace locked into full extension w/out crutches. MODALITIES: (15 minutes):  Vasopneumatic Cold Compression @ 34deg; med compression w/ B LE elevated on wedge to aide with decreasing pain, inflammation and swelling to improve L knee AROM     [employer] Portal  Access Code: [email or web address]   URL: [email or web address]   Date: 03/08/2021   Prepared by: Dali Force     Exercises   Supine Heel Slide with Small Ball - 10 reps - 2 sets - 2x daily - 7x weekly   Supine Quad Set - 10 reps - 2 sets - 2x daily - 7x weekly   Long Sitting Quad Set with Towel Roll Under Heel - 10 reps - 2 sets - 2x daily - 7x weekly   Sidelying Hip Abduction - 10 reps - 2 sets - 2x daily - 7x weekly   Small Range Straight Leg Raise - 10 reps - 2 sets - 2x daily - 7x weekly   Long Sitting Hamstring Stretch - 10 reps - 1 sets - 2x daily - 7x weekly       Treatment/Session Summary:    · Response to Treatment:  Patient was fatigued with recumbent stepper and shuttle press today. L knee AROM remains the same as earlier in the week. Patient able to ambulate without crutches w/ brace donned. · Communication/Consultation:   Patient encouraged to perform more gait and WB activities at home. Brace locked out at 0deg. No signs of infection. HEP 2-3x daily. Ice 2-3x daily. · Equipment provided today:  HEP w/ instruction sheet  · Recommendations/Intent for next treatment session: Next visit will focus on L knee AROM, strength, balance and gait.  Consider kinesiotape again next visit    Total Treatment Billable Duration: 60 minutes (45 min therex; 15 min vaso)  PT Patient Time In/Time Out  Time In: 0945  Time Out: 500 Jc Singer PT    Future Appointments   Date Time Provider Daisha Vazquez   3/22/2021  9:45 AM Marcela Paget, PT [facility]   3/24/2021  9:45 AM Marcela Paget, PT [facility]   4/6/2021 10:30 AM Nic Claw, PT [facility]   4/8/2021  8:15 AM Nic Claw, PT [facility]   4/13/2021 10:30 AM Nic Claw, PT [facility]   4/15/2021 10:30 AM Nic Claw, PT [facility]   4/19/2021 10:30 AM Nic Claw, PT [facility]   4/22/2021 10:30 AM Nic Claw, PT [facility]   4/27/2021 10:30 AM Nic Claw, PT [facility]   4/29/2021 10:30 AM Jeannette Dickinson, PT [facility]

## 2021-03-22 ENCOUNTER — HOSPITAL ENCOUNTER (OUTPATIENT)
Dept: PHYSICAL THERAPY | Age: 46
Discharge: HOME | End: 2021-03-22
Payer: COMMERCIAL

## 2021-03-22 PROCEDURE — 97110 THERAPEUTIC EXERCISES: CPT | Performed by: PHYSICAL THERAPIST

## 2021-03-24 ENCOUNTER — HOSPITAL ENCOUNTER (OUTPATIENT)
Dept: PHYSICAL THERAPY | Age: 46
Discharge: HOME | End: 2021-03-24
Payer: COMMERCIAL

## 2021-03-24 PROCEDURE — 97110 THERAPEUTIC EXERCISES: CPT | Performed by: PHYSICAL THERAPIST

## 2021-03-24 NOTE — PROGRESS NOTES
Erik Hernandez  : 1975  Primary: [facility] Umesh Mehta*  Secondary:  [facility]  [facility] [place] [address], [place] [address]  [phone]   [phone]      OUTPATIENT PHYSICAL THERAPY: Daily Treatment Note 3/24/2021  ICD-10: Treatment Diagnosis: (M62.81) muscle weakness; (R 26.2) Difficulty Walking; (M25.562) L knee pain  Precautions/Allergies:   Patient has no known allergies. TREATMENT PLAN:  Effective Dates: 3/8/2021 TO 2021 (90 days). Frequency/Duration: 2 times a week for 90 Day(s)     MEDICAL/REFERRING DIAGNOSIS:  Pain in left knee [M25.562]   DATE OF ONSET: DOI: 20  REFERRING PHYSICIAN: Felisa Campbell MD MD Orders: Evaluate and Treat  Return MD Appointment: None Scheduled Yet     Pre-treatment Symptoms/Complaints:  Patient reports she saw MD, and MD is pleased with her current progress. Pain: Initial: Pain Intensity 1: 2  Pain Location 1: Knee  Pain Orientation 1: Left  Post Session:  2-3/10 soreness and fatigue    Medications Last Reviewed:  3/24/2021    Updated Objective Findings:  3-24-21: L knee AROM on entry: 5deg-92deg; After treatment: 3deg-105deg Patient still ambulating with brace locked into extension as she has not yet achieved full extension and still has 5deg extensor lag. TREATMENT:   THERAPEUTIC EXERCISE: (50 minutes):  Exercises per grid below to improve mobility, strength, balance and coordination. Required moderate visual, verbal and tactile cues to promote proper body alignment, promote proper body posture, promote proper body mechanics and promote proper body breathing techniques. Progressed resistance, range, repetitions and complexity of movement as indicated.      Date:  21 Date:  3/8/21 Date:  3/9/21 Date:  3/11/21 Date:  3/15/21 Date:  3/17/21 Date:  3/22/21 Date:  3/24/21   Activity/Exercise           AP           Ankle Circles           Heel Slides  15x 20x 20x W/ ball and toe tap; 50x W/ toe tap; 20x W/ ball; 20x  W/ toe tap; 20x 10x   QS  20 x 5sec 20 x 5sec 20 x 5sec 30 x 10sec w/ estim 10x W/estim; 20x 10x   SLR w/ eccentric lowering  10x 20x 20x 30 x 10sec w/estim      SLR  15x 20x 20x 10x 20x W/estim; 20x 10x   SAQ           Bridging w/ add sq    20x 10x  20x    LAQ       W/estim; 20x    HSC- seated       20x    HS/GS stretch w/ strap           Stdg GS stretch 5 x 30sec on board 5 x 30sec w/ strap 5 x 30sec w/ strap 5 x 30sec w/ strap 5 x 30sec w/ strap and 5 x 30sec on slant board 5 x 30sec w/ strap and 5 x 30sec on slant board 5 x 30sec w/ strap 5 x 30sec on slant board   S/L hip ABD  15x 20x 20x 30 x 10sec w/ estim  20x    Clams           Seated LAQ w/ band           Seated HSC w/ band           Stdg TKE        Blue tband; 30x/HEP   Resisted side-stepping RTB; thighs; 50' x 6          Resisted diagonals RTB; thighs; 50' x 4          Hamstring digs into ball    20x       Shuttle SL 50#; 20x L       12.5#; 30x   Shuttle B; w  Ball squeeze; w/GTB 75#; 40x B     50#; 30x  50# B RTB; 30x   Weight shifting side-side   20x B        Weight shifting tandem B   20x B        Heel raises   20x B 10x 30x B 30x B     Toe Raises   20x B  30x B 30x B     Stdg hip ABD   20x B  10x B 10x B     stdg SL heel raises L     5x 5x  10x L   stdg SLS     5 x 5-10sec B   10 x 5 sec L   Sit-stand from varying heights working on equal WB B     20x      Sit-stand with L LE bias     5x      Kinesiotape for patellar support   Quad facilitation        Gait- HS/TO seq   In // bars and in clinic w/ 1 crutch; 1 crutch in clinic See below W/ brace and w/out crutches; 200' 1 crutch; no brace in clinic. No crutch; no brace   Recumbent Stepper L3; 9min     L1; 10min L2; 10min L2; 10min     GAIT TRAINING: (0 minutes): working on HS/TO sequencing using 1 crutch w/out brace donned in clinic only. Also ambulating with brace locked into full extension w/out crutches.      MODALITIES: (0 minutes):  Vasopneumatic Cold Compression @ 34deg; med compression w/ B LE elevated on wedge to aide with decreasing pain, inflammation and swelling to improve L knee AROM     [employer] Portal  Access Code: [email or web address]   URL: [email or web address]   Date: 03/08/2021   Prepared by: Ai Brown     Exercises   Supine Heel Slide with Small Ball - 10 reps - 2 sets - 2x daily - 7x weekly   Supine Quad Set - 10 reps - 2 sets - 2x daily - 7x weekly   Long Sitting Quad Set with Towel Roll Under Heel - 10 reps - 2 sets - 2x daily - 7x weekly   Sidelying Hip Abduction - 10 reps - 2 sets - 2x daily - 7x weekly   Small Range Straight Leg Raise - 10 reps - 2 sets - 2x daily - 7x weekly   Long Sitting Hamstring Stretch - 10 reps - 1 sets - 2x daily - 7x weekly       Treatment/Session Summary:    · Response to Treatment:  Pt's L knee AROM and quad control continues to improve. Patient not confident in ambulating in the gym without crutch or brace due to quad weakness and anterior knee pain. · Communication/Consultation:   Patient encouraged to perform more gait and WB activities at home. Brace locked out at 0deg due to mild extensor lag. No signs of infection. HEP 2-3x daily. Ice 2-3x daily. · Equipment provided today:  HEP w/ instruction sheet  · Recommendations/Intent for next treatment session: Next visit will focus on L knee AROM, strength, balance and gait. MD f/u  In 4 weeks.      Total Treatment Billable Duration: 50 min therex  PT Patient Time In/Time Out  Time In: 0940  Time Out: 100 [facility], PT    Future Appointments   Date Time Provider Daisha Vazquez   4/6/2021 10:30 AM Sarah Mater, PT [facility]   4/8/2021  8:15 AM Sarah Mater, PT [facility]   4/13/2021 10:30 AM Sarah Mater, PT [facility]   4/15/2021 10:30 AM Sarah Mater, PT [facility]   4/19/2021 10:30 AM Sarah Mater, PT [facility]   4/22/2021 10:30 AM Jennyfer Dickinson, PT [facility] 4/27/2021 10:30 AM Jeannie Dickinson, [facility]   4/29/2021 10:30 AM Nader Dickinson[facility]

## 2021-04-06 ENCOUNTER — HOSPITAL ENCOUNTER (OUTPATIENT)
Dept: PHYSICAL THERAPY | Age: 46
Discharge: HOME | End: 2021-04-06
Payer: COMMERCIAL

## 2021-04-06 PROCEDURE — 97110 THERAPEUTIC EXERCISES: CPT | Performed by: PHYSICAL THERAPIST

## 2021-04-08 ENCOUNTER — HOSPITAL ENCOUNTER (OUTPATIENT)
Dept: PHYSICAL THERAPY | Age: 46
Discharge: HOME | End: 2021-04-08
Payer: COMMERCIAL

## 2021-04-08 PROCEDURE — 97110 THERAPEUTIC EXERCISES: CPT | Performed by: PHYSICAL THERAPIST

## 2021-04-08 NOTE — PROGRESS NOTES
Jose Vazquez  : 1975  Primary: [facility]mesh Mehta*  Secondary:  [facility]  [facility] [address], [place] [address]  [phone]   [phone]      OUTPATIENT PHYSICAL THERAPY: Daily Treatment Note 2021  ICD-10: Treatment Diagnosis: (M62.81) muscle weakness; (R 26.2) Difficulty Walking; (M25.562) L knee pain  Precautions/Allergies:   Patient has no known allergies. TREATMENT PLAN:  Effective Dates: 3/8/2021 TO 2021 (90 days). Frequency/Duration: 2 times a week for 90 Day(s)     MEDICAL/REFERRING DIAGNOSIS:  Pain in left knee [M25.562]   DATE OF ONSET: DOI: 20/DOS: 3-5-21  REFERRING PHYSICIAN: Serena Lewis MD MD Orders: Evaluate and Treat  Return MD Appointment: 21     Pre-treatment Symptoms/Complaints: Patient reports soreness after last PT visit. States she is frustrated wearing her brace. Pain: Initial: Pain Intensity 1: 2  Pain Location 1: Knee  Pain Orientation 1: Left  Post Session:  2-3/10 soreness and fatigue    Medications Last Reviewed:  2021    Updated Objective Findings:  21: L knee AROM on entry: 5deg-100deg; After treatment: 3deg-110deg Patient still ambulating with brace locked into extension as she has not yet achieved full extension and still has 5deg extensor lag. TREATMENT:   THERAPEUTIC EXERCISE: (50 minutes):  Exercises per grid below to improve mobility, strength, balance and coordination. Required moderate visual, verbal and tactile cues to promote proper body alignment, promote proper body posture, promote proper body mechanics and promote proper body breathing techniques. Progressed resistance, range, repetitions and complexity of movement as indicated.      Date:  3/15/21 Date:  3/17/21 Date:  3/22/21 Date:  3/24/21 Date:  21 Date:  21   Activity/Exercise         AP         Ankle Circles         Heel Slides W/ ball and toe tap; 50x W/ toe tap; 20x W/ ball; 20x  W/ toe tap; 20x 10x 20x    QS 30 x 10sec w/ estim 10x W/estim; 20x 10x W/ estim; 30x    SLR w/ eccentric lowering 30 x 10sec w/estim    20x    SLR 10x 20x W/estim; 20x 10x W/estim; 20x    SAQ         Bridging w/ add sq 10x  20x      LAQ   W/estim; 20x  W/estim; 20x    HSC- seated   20x  Lime TB; 20x    HS/GS stretch w/ strap         Stdg GS stretch 5 x 30sec w/ strap and 5 x 30sec on slant board 5 x 30sec w/ strap and 5 x 30sec on slant board 5 x 30sec w/ strap 5 x 30sec on slant board  5 x 30sec on slant board   8\" stairs      2 flights            S/L hip ABD 30 x 10sec w/ estim  20x      Clams         Seated LAQ w/ band         Seated HSC w/ band      30x   Stdg TKE    Blue tband; 30x/HEP  Lime TB; 30x   Resisted side-stepping         Resisted diagonals         Hamstring digs into ball         Shuttle SL    12.5#; 30x     Shuttle B; w  [place] squeeze; w/GTB  50#; 30x  50# B RTB; 30x     Weight shifting side-side         Weight shifting tandem B         Heel raises 30x B 30x B       Toe Raises 30x B 30x B       Stdg hip ABD 10x B 10x B       stdg SL heel raises L 5x 5x  10x L     stdg SLS 5 x 5-10sec B   10 x 5 sec L     Sit-stand from varying heights working on equal WB B 20x        Sit-stand with L LE bias 5x        Kinesiotape for patellar support         Manual HS/GS stretching into extension     5 x 30sec hold    Supine hip flexor/quad stretching w/ knee flexion     5 x 30sec hold    Gait- HS/TO seq See below W/ brace and w/out crutches; 200' 1 crutch; no brace in clinic. No crutch; no brace No crutch; no brace 500+' working on HS/TO seq, step-length and stance time   Recumbent Stepper  L1; 10min L2; 10min L2; 10min L2; 10min L2; 12min     GAIT TRAINING: (0 minutes): working on HS/TO sequencing using 1 crutch w/out brace donned in clinic only. Also ambulating with brace locked into full extension w/out crutches.      MODALITIES: (0 minutes):  Vasopneumatic Cold Compression @ 34deg; med compression w/ B LE elevated on wedge to aide with decreasing pain, inflammation and swelling to improve L knee AROM     [employer] Portal  Access Code: [email or web address]   URL: [email or web address]   Date: 03/08/2021   Prepared by: Jeremie Harry     Exercises   Supine Heel Slide with Small Ball - 10 reps - 2 sets - 2x daily - 7x weekly   Supine Quad Set - 10 reps - 2 sets - 2x daily - 7x weekly   Long Sitting Quad Set with Towel Roll Under Heel - 10 reps - 2 sets - 2x daily - 7x weekly   Sidelying Hip Abduction - 10 reps - 2 sets - 2x daily - 7x weekly   Small Range Straight Leg Raise - 10 reps - 2 sets - 2x daily - 7x weekly   Long Sitting Hamstring Stretch - 10 reps - 1 sets - 2x daily - 7x weekly       Treatment/Session Summary:    · Response to Treatment:  Improving gait quality without brace donned. She still has not reached full extension, however, is close when performing resisted TKE. She had pain under patella when descending stairs today due to abrasion arthoplasty. · Communication/Consultation:   Patient encouraged to perform more gait and WB activities at home. Brace locked out at 0deg due to mild extensor lag. No signs of infection. HEP 2-3x daily. Ice 2-3x daily. · Equipment provided today:  HEP w/ instruction sheet  · Recommendations/Intent for next treatment session: Next visit will focus on L knee AROM, strength, balance and gait. Try shuttle, standing and balance activities later this week.      Total Treatment Billable Duration: 50 min therex  PT Patient Time In/Time Out  Time In: 0820  Time Out: 3367  [facility], PT    Future Appointments   Date Time Provider Daisha Vazquez   4/13/2021 10:30 AM Emre Ban, PT [facility]   4/15/2021 10:30 AM Emre Ban, PT [facility]   4/19/2021 10:30 AM Emre Ban, PT [facility]   4/22/2021 10:30 AM Emre Ban, PT [facility]   4/27/2021 10:30 AM Emre Ban, PT [facility]   4/29/2021 10:30 AM Melodie Dickinson, PT [facility]

## 2021-04-13 ENCOUNTER — HOSPITAL ENCOUNTER (OUTPATIENT)
Dept: PHYSICAL THERAPY | Age: 46
Discharge: HOME | End: 2021-04-13
Payer: COMMERCIAL

## 2021-04-13 PROCEDURE — 97110 THERAPEUTIC EXERCISES: CPT | Performed by: PHYSICAL THERAPIST

## 2021-04-13 NOTE — PROGRESS NOTES
Carlie Dunbar  : 1975  Primary: [facility] Levert [facility] Of Umesh Mehta*  Secondary:  [facility]  [facility] [address], [place] [address]  [phone]   [phone]      OUTPATIENT PHYSICAL THERAPY: Daily Treatment Note 2021  ICD-10: Treatment Diagnosis: (M62.81) muscle weakness; (R 26.2) Difficulty Walking; (M25.562) L knee pain  Precautions/Allergies:   Patient has no known allergies. TREATMENT PLAN:  Effective Dates: 3/8/2021 TO 2021 (90 days). Frequency/Duration: 2 times a week for 90 Day(s)     MEDICAL/REFERRING DIAGNOSIS:  Pain in left knee [M25.562]   DATE OF ONSET: DOI: 20/DOS: 3-5-21  REFERRING PHYSICIAN: Mohini Saucedo MD MD Orders: Evaluate and Treat  Return MD Appointment: 21     Pre-treatment Symptoms/Complaints: Patient reports she feels her L leg is getting stronger. Pain: Initial: Pain Intensity 1: 2  Pain Location 1: Knee  Pain Orientation 1: Left  Post Session:  2/10 soreness and fatigue    Medications Last Reviewed:  2021    Updated Objective Findings:  21: L knee AROM on entry: 5deg-108deg; After treatment: 2deg-115deg Patient still ambulating with brace locked into extension as she has not yet achieved full extension and still has 5deg extensor lag. TREATMENT:   THERAPEUTIC EXERCISE: (60 minutes):  Exercises per grid below to improve mobility, strength, balance and coordination. Required moderate visual, verbal and tactile cues to promote proper body alignment, promote proper body posture, promote proper body mechanics and promote proper body breathing techniques. Progressed resistance, range, repetitions and complexity of movement as indicated.      Date:  3/15/21 Date:  3/17/21 Date:  3/22/21 Date:  3/24/21 Date:  21 Date:  21 Date:  21   Activity/Exercise          AP          Ankle Circles          Heel Slides W/ ball and toe tap; 50x W/ toe tap; 20x W/ ball; 20x  W/ toe tap; 20x 10x 20x  10x   QS 30 x 10sec w/ estim 10x W/estim; 20x 10x W/ estim; 30x  10x   SLR w/ eccentric lowering 30 x 10sec w/estim    20x  10x   SLR 10x 20x W/estim; 20x 10x W/estim; 20x     SAQ          Bridging w/ add sq 10x  20x       LAQ   W/estim; 20x  W/estim; 20x     HSC- seated   20x  Lime TB; 20x     HS/GS stretch w/ strap       Manual stretch 5 x 30sec hold   Stdg GS stretch 5 x 30sec w/ strap and 5 x 30sec on slant board 5 x 30sec w/ strap and 5 x 30sec on slant board 5 x 30sec w/ strap 5 x 30sec on slant board  5 x 30sec on slant board    8\" stairs      2 flights    4\" step-ups and over       30x   6\" step-ups and over       30x             S/L hip ABD 30 x 10sec w/ estim  20x       Clams          Seated LAQ w/ band          Seated HSC w/ band      30x    Stdg TKE    Blue tband; 30x/HEP  Lime TB; 30x Blue TB; 30x   Resisted side-stepping          Resisted diagonals          Hamstring digs into ball          Shuttle SL    12.5#; 30x   50#; 30x   Shuttle B; w  [place] squeeze; w/GTB  50#; 30x  50# B RTB; 30x   50#; 10x   Weight shifting side-side          Weight shifting tandem B          Heel raises 30x B 30x B        Toe Raises 30x B 30x B        Stdg hip ABD 10x B 10x B        stdg SL heel raises L 5x 5x  10x L      stdg SLS 5 x 5-10sec B   10 x 5 sec L      Sit-stand from varying heights working on equal WB B 20x         Sit-stand with L LE bias 5x         Kinesiotape for patellar support          Manual HS/GS stretching into extension     5 x 30sec hold     Supine hip flexor/quad stretching w/ knee flexion     5 x 30sec hold     Gait- HS/TO seq See below W/ brace and w/out crutches; 200' 1 crutch; no brace in clinic.  No crutch; no brace No crutch; no brace 500+' working on HS/TO seq, step-length and stance time 500+' working on HS/TO seq, step-length and stance time   Recumbent Stepper  L1; 10min L2; 10min L2; 10min L2; 10min L2; 12min L2; 12min      GAIT TRAINING: (0 minutes): working on HS/TO sequencing using 1 crutch w/out brace donned in clinic only. Also ambulating with brace locked into full extension w/out crutches. MODALITIES: (0 minutes):  Vasopneumatic Cold Compression @ 34deg; med compression w/ B LE elevated on wedge to aide with decreasing pain, inflammation and swelling to improve L knee AROM     [employer] Portal  Access Code: [email or web address]   URL: [email or web address]   Date: 03/08/2021   Prepared by: Patti Rivas     Exercises   Supine Heel Slide with Small Ball - 10 reps - 2 sets - 2x daily - 7x weekly   Supine Quad Set - 10 reps - 2 sets - 2x daily - 7x weekly   Long Sitting Quad Set with Towel Roll Under Heel - 10 reps - 2 sets - 2x daily - 7x weekly   Sidelying Hip Abduction - 10 reps - 2 sets - 2x daily - 7x weekly   Small Range Straight Leg Raise - 10 reps - 2 sets - 2x daily - 7x weekly   Long Sitting Hamstring Stretch - 10 reps - 1 sets - 2x daily - 7x weekly       Treatment/Session Summary:    · Response to Treatment:  Patient fatigued with eccentric stair work at [employer] and 6\" steps. Her L knee extension is improving due to improving L quad strength. She has not yet achieved full extension, but no longer demonstrates extensor lag. · Communication/Consultation:   Patient encouraged to perform more gait and WB activities at home. Brace locked out at 0deg due to mild extensor lag. No signs of infection. HEP 2-3x daily. Ice 2-3x daily. · Equipment provided today:  HEP w/ instruction sheet  · Recommendations/Intent for next treatment session: Next visit will focus on L knee AROM, strength, balance and gait.      Total Treatment Billable Duration: 60 min therex  PT Patient Time In/Time Out  Time In: 1030  Time Out: [address] [facility], PT    Future Appointments   Date Time Provider Daisha Vazquez   4/15/2021 10:30 AM [facility]enzin Joseph [facility]   4/19/2021 10:30 AM Tenzin Joseph, PT [facility]   4/22/2021 10:30 AM Jalen Dickinson, PT [facility]   4/27/2021 10:30 AM Carmen Evans, PT [facility]   4/29/2021 10:30 AM Andrew Dickinson, PT [facility]

## 2021-04-15 ENCOUNTER — HOSPITAL ENCOUNTER (OUTPATIENT)
Dept: PHYSICAL THERAPY | Age: 46
Discharge: HOME | End: 2021-04-15
Payer: COMMERCIAL

## 2021-04-15 NOTE — PROGRESS NOTES
Rebecca Bernstein  : 1975  Primary: Sc Arseniocel[facility] April Of Umesh Mehta*  Secondary:  [facility] [address] [place] [address], [address], [place] [address]  [phone]   [phone]      OUTPATIENT PHYSICAL THERAPY: Daily Treatment Note 4/15/2021  ICD-10: Treatment Diagnosis: (M62.81) muscle weakness; (R 26.2) Difficulty Walking; (M25.562) L knee pain  Precautions/Allergies:   Patient has no known allergies. TREATMENT PLAN:  Effective Dates: 3/8/2021 TO 2021 (90 days). Frequency/Duration: 2 times a week for 90 Day(s)     MEDICAL/REFERRING DIAGNOSIS:  Pain in left knee [M25.562]   DATE OF ONSET: DOI: 20/DOS: 3-5-21  REFERRING PHYSICIAN: Prema Esquivel MD MD Orders: Evaluate and Treat  Return MD Appointment: 21         NAME/AGE/GENDER: Rebecca Bernstein is a 50 y.o. female. DATE: 2021    Ms. Rubin Flores for today's appointment due to conflict. Adair Bull, PT       Josefina Bull, PT    Future Appointments   Date Time Provider Daisha Vazquez   4/15/2021 10:30 AM Carmen Evans, PT [facility]   2021 10:30 AM Carmen Evans, PT [facility]   2021 10:30 AM [facility]   2021 10:30 AM Carmen Evans, [facility]   2021 10:30 AM Andrew Montana, PT [facility]

## 2021-04-19 ENCOUNTER — HOSPITAL ENCOUNTER (OUTPATIENT)
Dept: PHYSICAL THERAPY | Age: 46
Discharge: HOME | End: 2021-04-19
Payer: COMMERCIAL

## 2021-04-19 PROCEDURE — 97110 THERAPEUTIC EXERCISES: CPT | Performed by: PHYSICAL THERAPIST

## 2021-04-19 NOTE — PROGRESS NOTES
El Marylin  : 1975  Primary: [facility]  Secondary:  [facility]  [facility] [address], [place] [address]  [phone]   [phone]      OUTPATIENT PHYSICAL THERAPY: Daily Treatment Note 2021  ICD-10: Treatment Diagnosis: (M62.81) muscle weakness; (R 26.2) Difficulty Walking; (M25.562) L knee pain  Precautions/Allergies:   Patient has no known allergies. TREATMENT PLAN:  Effective Dates: 3/8/2021 TO 2021 (90 days). Frequency/Duration: 2 times a week for 90 Day(s)     MEDICAL/REFERRING DIAGNOSIS:  Pain in left knee [M25.562]   DATE OF ONSET: DOI: 20/DOS: 3-5-21  REFERRING PHYSICIAN: Mimi Wade MD MD Orders: Evaluate and Treat  Return MD Appointment: 21     Pre-treatment Symptoms/Complaints: Patient reports some L knee fatigue and tightness today due to over doing it yesterday. She reports only occasionally wearing her brace. Pain: Initial: Pain Intensity 1: 3  Pain Location 1: Knee  Pain Orientation 1: Left  Post Session:  2/10 soreness and fatigue    Medications Last Reviewed:  2021    Updated Objective Findings:  21: L knee AROM on entry: 5deg-105deg; After treatment: 2deg-112deg Patient still ambulating with brace locked into extension as she has not yet achieved full extension and still has 5deg extensor lag. TREATMENT:   THERAPEUTIC EXERCISE: (45 minutes):  Exercises per grid below to improve mobility, strength, balance and coordination. Required moderate visual, verbal and tactile cues to promote proper body alignment, promote proper body posture, promote proper body mechanics and promote proper body breathing techniques. Progressed resistance, range, repetitions and complexity of movement as indicated.      Date:  3/24/21 Date:  21 Date:  21 Date:  21 Date:  21   Activity/Exercise        AP        Ankle Circles        Heel Slides 10x 20x  10x 10x   QS 10x W/ estim; 30x  10x 10x   SLR w/ eccentric lowering  20x  10x 10x   SLR 10x W/estim; 20x      SAQ        Bridging w/ add sq        LAQ  W/estim; 20x      HSC- seated  Lime TB; 20x      HS/GS stretch w/ strap    Manual stretch 5 x 30sec hold Manual stretch  5 x 30sec   Stdg GS stretch 5 x 30sec on slant board  5 x 30sec on slant board     8\" stairs   2 flights     4\" step-ups and over    30x    6\" step-ups and over    30x            S/L hip ABD        Clams        Seated LAQ w/ band        Seated HSC w/ band   30x     Stdg TKE Blue tband; 30x/HEP  Lime TB; 30x Blue TB; 30x    Resisted side-stepping        Resisted diagonals        Hamstring digs into ball        Shuttle SL 12.5#; 30x   50#; 30x 25#; 30x   Shuttle B; w  Myrla Samples squeeze; w/GTB 50# B RTB; 30x   50#; 10x 50#; 30x   Sidelying shuttle SL     25#; 20x   Hip extension shuttle     25#; 20x   Weight shifting side-side        Weight shifting tandem B        Heel raises        Toe Raises        Stdg hip ABD        stdg SL heel raises L 10x L       stdg SLS 10 x 5 sec L       Sit-stand from varying heights working on equal WB B        Sit-stand with L LE bias        Kinesiotape for patellar support        Manual HS/GS stretching into extension  5 x 30sec hold      Supine hip flexor/quad stretching w/ knee flexion  5 x 30sec hold      Gait- HS/TO seq No crutch; no brace No crutch; no brace 500+' working on HS/TO seq, step-length and stance time 500+' working on HS/TO seq, step-length and stance time 200' working on HS/TO seq   Recumbent Stepper L2; 10min L2; 10min L2; 12min L2; 12min  L2; 12min     GAIT TRAINING: (0 minutes): working on HS/TO sequencing using 1 crutch w/out brace donned in clinic only. Also ambulating with brace locked into full extension w/out crutches.      MODALITIES: (0 minutes):  Vasopneumatic Cold Compression @ 34deg; med compression w/ B LE elevated on wedge to aide with decreasing pain, inflammation and swelling to improve L knee AROM     [employer] Portal  Access Code: [email or web address]   URL: [email or web address]   Date: 03/08/2021   Prepared by: Lisette Pablo     Exercises   Supine Heel Slide with Small Ball - 10 reps - 2 sets - 2x daily - 7x weekly   Supine Quad Set - 10 reps - 2 sets - 2x daily - 7x weekly   Long Sitting Quad Set with Towel Roll Under Heel - 10 reps - 2 sets - 2x daily - 7x weekly   Sidelying Hip Abduction - 10 reps - 2 sets - 2x daily - 7x weekly   Small Range Straight Leg Raise - 10 reps - 2 sets - 2x daily - 7x weekly   Long Sitting Hamstring Stretch - 10 reps - 1 sets - 2x daily - 7x weekly       Treatment/Session Summary:    · Response to Treatment:  Mild loss in L knee AROM today. Did well with CKC strengthenin using the shuttle. · Communication/Consultation:  Patient encouraged to do squats, SL heel raises and SLS as HEP this week. · Equipment provided today:  HEP w/ instruction sheet  · Recommendations/Intent for next treatment session: Next visit will focus on L knee AROM, strength, balance and gait.      Total Treatment Billable Duration: 45 min therex  PT Patient Time In/Time Out  Time In: 1030  Time Out: [address], PT    Future Appointments   Date Time Provider Daisha Vazquez   4/22/2021 10:30 AM Jobe Rubinstein, PT [facility]   4/27/2021 10:30 AM [facility]   4/29/2021 10:30 AM Mary Dickinson, PT [facility]

## 2021-04-22 ENCOUNTER — HOSPITAL ENCOUNTER (OUTPATIENT)
Dept: PHYSICAL THERAPY | Age: 46
Discharge: HOME | End: 2021-04-22
Payer: COMMERCIAL

## 2021-04-22 PROCEDURE — 97110 THERAPEUTIC EXERCISES: CPT | Performed by: PHYSICAL THERAPIST

## 2021-04-22 NOTE — PROGRESS NOTES
Dinorah Olson  : 1975  Primary: [facility]  Secondary:  [facility]  [address] , [address], [place] [address]  [phone]   [phone]      OUTPATIENT PHYSICAL THERAPY: Daily Treatment Note 2021  ICD-10: Treatment Diagnosis: (M62.81) muscle weakness; (R 26.2) Difficulty Walking; (M25.562) L knee pain  Precautions/Allergies:   Patient has no known allergies. TREATMENT PLAN:  Effective Dates: 3/8/2021 TO 2021 (90 days). Frequency/Duration: 2 times a week for 90 Day(s)     MEDICAL/REFERRING DIAGNOSIS:  Pain in left knee [M25.562]   DATE OF ONSET: DOI: 20/DOS: 3-5-21  REFERRING PHYSICIAN: Garret Clifton MD MD Orders: Evaluate and Treat  Return MD Appointment: 21     Pre-treatment Symptoms/Complaints: Patient reports she remembered why she's so sore this week. She reports helping move a heavy desk up a flight of stairs in [place] [address] on . Pain: Initial: Pain Intensity 1: 3  Pain Location 1: Knee  Pain Orientation 1: Left  Post Session:  2-3/10 soreness and fatigue    Medications Last Reviewed:  2021    Updated Objective Findings:  21: L knee AROM on entry: 5deg-108deg; After treatment: 2deg-112deg Patient still ambulating with brace locked into extension as she has not yet achieved full extension and still has 5deg extensor lag. TREATMENT:   THERAPEUTIC EXERCISE: (60 minutes):  Exercises per grid below to improve mobility, strength, balance and coordination. Required moderate visual, verbal and tactile cues to promote proper body alignment, promote proper body posture, promote proper body mechanics and promote proper body breathing techniques. Progressed resistance, range, repetitions and complexity of movement as indicated.      Date:  3/24/21 Date:  21 Date:  21 Date:  21 Date:  21 Date:  21   Activity/Exercise         AP         Ankle Circles         Heel Slides 10x 20x  10x 10x 10x   QS 10x W/ estim; 30x  10x 10x 10x   SLR w/ eccentric lowering  20x  10x 10x 10x   SLR 10x W/estim; 20x       SAQ         Bridging w/ add sq         LAQ  W/estim; 20x       HSC- seated  Lime TB; 20x       HS/GS stretch w/ strap    Manual stretch 5 x 30sec hold Manual stretch  5 x 30sec    Stdg GS stretch 5 x 30sec on slant board  5 x 30sec on slant board   5 x 30sec on slant board   8\" stairs   2 flights      4\" step-ups and over    30x     6\" step-ups and over    30x     4\" forward step-downs      2 x 15 L   4\" lateral step-downs       2 x 15 L                     S/L hip ABD         Clams         Seated LAQ w/ band         Seated HSC w/ band   30x      Stdg TKE Blue tband; 30x/HEP  Lime TB; 30x Blue TB; 30x  Blue TB; 30x   Resisted side-stepping      Lime; thighs; 50' x 6   Resisted diagonals      Lime; thighs; 50' x 6   Hamstring digs into ball         Shuttle SL 12.5#; 30x   50#; 30x 25#; 30x    Shuttle B; w  Ball squeeze; w/GTB 50# B RTB; 30x   50#; 10x 50#; 30x    Sidelying shuttle SL     25#; 20x    Hip extension shuttle     25#; 20x    Weight shifting side-side         Weight shifting tandem B         Heel raises         Toe Raises         Stdg hip ABD         stdg SL heel raises L 10x L        stdg SLS 10 x 5 sec L        Sit-stand from varying heights working on equal WB B         Sit-stand with L LE bias         Kinesiotape for patellar support         Manual HS/GS stretching into extension  5 x 30sec hold       Supine hip flexor/quad stretching w/ knee flexion  5 x 30sec hold       Gait- HS/TO seq No crutch; no brace No crutch; no brace 500+' working on HS/TO seq, step-length and stance time 500+' working on HS/TO seq, step-length and stance time 200' working on HS/TO seq    Recumbent Stepper L2; 10min L2; 10min L2; 12min L2; 12min  L2; 12min L2; 12min     GAIT TRAINING: (0 minutes): working on HS/TO sequencing using 1 crutch w/out brace donned in clinic only. Also ambulating with brace locked into full extension w/out crutches. MODALITIES: (0 minutes):  Vasopneumatic Cold Compression @ 34deg; med compression w/ B LE elevated on wedge to aide with decreasing pain, inflammation and swelling to improve L knee AROM     [employer] Portal  Access Code: [email or web address]   URL: [email or web address]   Date: 03/08/2021   Prepared by: Jerri Huntley     Exercises   Supine Heel Slide with Small Ball - 10 reps - 2 sets - 2x daily - 7x weekly   Supine Quad Set - 10 reps - 2 sets - 2x daily - 7x weekly   Long Sitting Quad Set with Towel Roll Under Heel - 10 reps - 2 sets - 2x daily - 7x weekly   Sidelying Hip Abduction - 10 reps - 2 sets - 2x daily - 7x weekly   Small Range Straight Leg Raise - 10 reps - 2 sets - 2x daily - 7x weekly   Long Sitting Hamstring Stretch - 10 reps - 1 sets - 2x daily - 7x weekly       Treatment/Session Summary:    · Response to Treatment:  L knee stiffness continues today. She required minimal vc to avoid valgus moment with 4\" forward and lateral step-downs. Improved quality and control with increased repetitions, indicating improved muscle memory. · Communication/Consultation:  Patient encouraged to do squats, SL heel raises and SLS as HEP this week. · Equipment provided today:  HEP w/ instruction sheet  · Recommendations/Intent for next treatment session: Next visit will focus on L knee AROM, strength, balance and gait.      Total Treatment Billable Duration: 60 minutes therex  PT Patient Time In/Time Out  Time In: 1040  Time Out: [address], PT    Future Appointments   Date Time Provider Daisha Vazquez   4/27/2021 10:30 AM Eugene Gill, PT [facility]   4/29/2021 10:30 AM [facility]

## 2021-04-27 ENCOUNTER — HOSPITAL ENCOUNTER (OUTPATIENT)
Dept: PHYSICAL THERAPY | Age: 46
Discharge: HOME | End: 2021-04-27
Payer: COMMERCIAL

## 2021-04-27 PROCEDURE — 97110 THERAPEUTIC EXERCISES: CPT | Performed by: PHYSICAL THERAPIST

## 2021-04-27 NOTE — PROGRESS NOTES
Carlie Dunbar  : 1975  Primary: [facility]  Secondary:  [facility] [place] [place] [address], [address], [place] [address]  [phone]   [phone]      OUTPATIENT PHYSICAL THERAPY: Daily Treatment Note 2021  ICD-10: Treatment Diagnosis: (M62.81) muscle weakness; (R 26.2) Difficulty Walking; (M25.562) L knee pain  Precautions/Allergies:   Patient has no known allergies. TREATMENT PLAN:  Effective Dates: 3/8/2021 TO 2021 (90 days). Frequency/Duration: 2 times a week for 90 Day(s)     MEDICAL/REFERRING DIAGNOSIS:  Pain in left knee [M25.562]   DATE OF ONSET: DOI: 20/DOS: 3-5-21  REFERRING PHYSICIAN: Mohini Saucedo MD MD Orders: Evaluate and Treat  Return MD Appointment: 21     Pre-treatment Symptoms/Complaints: Patient reports she saw MD last week, and although MD is pleased with her progress, he wants her to be more aggressive. Pain: Initial: Pain Intensity 1: 3  Pain Location 1: Knee  Pain Orientation 1: Left  Post Session:  2-3/10 soreness and fatigue    Medications Last Reviewed:  2021    Updated Objective Findings:  21: L knee AROM on entry: 3deg-112deg; After treatment: 2deg-123deg Patient still ambulating with brace locked into extension as she has not yet achieved full extension and still has 5deg extensor lag. TREATMENT:   THERAPEUTIC EXERCISE: (45 minutes):  Exercises per grid below to improve mobility, strength, balance and coordination. Required moderate visual, verbal and tactile cues to promote proper body alignment, promote proper body posture, promote proper body mechanics and promote proper body breathing techniques. Progressed resistance, range, repetitions and complexity of movement as indicated.      Date:  3/24/21 Date:  21 Date:  21 Date:  21 Date:  21 Date:  21 Date:  21   Activity/Exercise          AP          Ankle Circles Heel Slides 10x 20x  10x 10x 10x    QS 10x W/ estim; 30x  10x 10x 10x    SLR w/ eccentric lowering  20x  10x 10x 10x    SLR 10x W/estim; 20x        SAQ          Bridging w/ add sq          LAQ  W/estim; 20x     W/ Lime TB; 30x   Lateral step-squat w/ TBand       Lime; 20x B   stdg SLS toe taps       10x             HSC- seated  Lime TB; 20x     stdg w/ Lime TB; 30x   HS/GS stretch w/ strap    Manual stretch 5 x 30sec hold Manual stretch  5 x 30sec     Stdg GS stretch 5 x 30sec on slant board  5 x 30sec on slant board   5 x 30sec on slant board 3 x 30sec   8\" stairs   2 flights       4\" step-ups and over    30x      6\" step-ups and over    30x      4\" forward step-downs      2 x 15 L 2 x 10 B   4\" lateral step-downs       2 x 15 L 2 x 10 B                       S/L hip ABD          Clams          Seated LAQ w/ band          Seated HSC w/ band   30x       Stdg TKE Blue tband; 30x/HEP  Lime TB; 30x Blue TB; 30x  Blue TB; 30x    Resisted side-stepping      Lime; thighs; 50' x 6    Resisted diagonals      Lime; thighs; 50' x 6    Hamstring digs into ball          Shuttle SL 12.5#; 30x   50#; 30x 25#; 30x     Shuttle B; w  Shantelle Mario squeeze; w/GTB 50# B RTB; 30x   50#; 10x 50#; 30x     Sidelying shuttle SL     25#; 20x     Hip extension shuttle     25#; 20x     Weight shifting side-side          Weight shifting tandem B          Heel raises          Toe Raises          Stdg hip ABD          stdg SL heel raises L 10x L         stdg SLS 10 x 5 sec L         Sit-stand from varying heights working on equal WB B          Sit-stand with L LE bias          Kinesiotape for patellar support          Manual HS/GS stretching into extension  5 x 30sec hold        Supine hip flexor/quad stretching w/ knee flexion  5 x 30sec hold        Gait- HS/TO seq No crutch; no brace No crutch; no brace 500+' working on HS/TO seq, step-length and stance time 500+' working on HS/TO seq, step-length and stance time 200' working on HS/TO seq Recumbent Stepper L2; 10min L2; 10min L2; 12min L2; 12min  L2; 12min L2; 12min L2; 7min   Recumbent Bike       L1; 7min     GAIT TRAINING: (0 minutes): working on HS/TO sequencing using 1 crutch w/out brace donned in clinic only. Also ambulating with brace locked into full extension w/out crutches. MODALITIES: (0 minutes):  Vasopneumatic Cold Compression @ 34deg; med compression w/ B LE elevated on wedge to aide with decreasing pain, inflammation and swelling to improve L knee AROM     [employer] Portal  Access Code: [email or web address]   URL: [email or web address]   Date: 03/08/2021   Prepared by: Grecia Cook     Exercises   Supine Heel Slide with Small Ball - 10 reps - 2 sets - 2x daily - 7x weekly   Supine Quad Set - 10 reps - 2 sets - 2x daily - 7x weekly   Long Sitting Quad Set with Towel Roll Under Heel - 10 reps - 2 sets - 2x daily - 7x weekly   Sidelying Hip Abduction - 10 reps - 2 sets - 2x daily - 7x weekly   Small Range Straight Leg Raise - 10 reps - 2 sets - 2x daily - 7x weekly   Long Sitting Hamstring Stretch - 10 reps - 1 sets - 2x daily - 7x weekly       Treatment/Session Summary:    · Response to Treatment:  Improved L knee motion and gait quality/fluidity today. She did well with above exercises, but still fatigues very quickly due to residual L hip abductor/quad/hamstring weakness. · Communication/Consultation:  Patient encouraged to do squats, SL heel raises and SLS as HEP this week. · Equipment provided today:  HEP w/ instruction sheet  · Recommendations/Intent for next treatment session: Next visit will focus on L knee AROM, strength, balance and gait.      Total Treatment Billable Duration: 45 minutes therex  PT Patient Time In/Time Out  Time In: 1030  Time Out: [address], PT    Future Appointments   Date Time Provider Daisha Vazquez   4/29/2021 10:30 AM Gail Dickinson, PT [facility]

## 2021-04-29 ENCOUNTER — HOSPITAL ENCOUNTER (OUTPATIENT)
Dept: PHYSICAL THERAPY | Age: 46
Discharge: HOME | End: 2021-04-29
Payer: COMMERCIAL

## 2021-04-29 PROCEDURE — 97110 THERAPEUTIC EXERCISES: CPT | Performed by: PHYSICAL THERAPIST

## 2021-04-29 NOTE — PROGRESS NOTES
Francisco Javier Hernandez  : 1975  Primary: [facility]  Secondary:  [facility] at [facility]  [address] , [address], [place] [address]  [phone]   [phone]      OUTPATIENT PHYSICAL THERAPY: Daily Treatment Note 2021  ICD-10: Treatment Diagnosis: (M62.81) muscle weakness; (R 26.2) Difficulty Walking; (M25.562) L knee pain  Precautions/Allergies:   Patient has no known allergies. TREATMENT PLAN:  Effective Dates: 3/8/2021 TO 2021 (90 days). Frequency/Duration: 2 times a week for 90 Day(s)     MEDICAL/REFERRING DIAGNOSIS:  Pain in left knee [M25.562]   DATE OF ONSET: DOI: 20/DOS: 3-5-21  REFERRING PHYSICIAN: Tyshawn Lopez MD MD Orders: Evaluate and Treat  Return MD Appointment: 21     Pre-treatment Symptoms/Complaints: Patient reports she's doing well, and is moving back to her home across [facility] this weekend. She states she's going to try to continue rehab on her own at her home gym for now. She will call if she needs to return. Pain: Initial: Pain Intensity 1: 2  Pain Location 1: Knee  Pain Orientation 1: Left  Post Session:  2-3/10 soreness and fatigue    Medications Last Reviewed:  2021    Updated Objective Findings:  21: L knee AROM on entry: 3deg-112deg; After treatment: 2deg-123deg Patient still ambulating with brace locked into extension as she has not yet achieved full extension and still has 5deg extensor lag. TREATMENT:   THERAPEUTIC EXERCISE: (45 minutes):  Exercises per grid below to improve mobility, strength, balance and coordination. Required moderate visual, verbal and tactile cues to promote proper body alignment, promote proper body posture, promote proper body mechanics and promote proper body breathing techniques. Progressed resistance, range, repetitions and complexity of movement as indicated.      Date:  3/24/21 Date:  21 Date:  21 Date:  21 Date:  21 Date:  4/22/21 Date:  4/27/21 Date:  4/29/21   Activity/Exercise           AP           Ankle Circles           Heel Slides 10x 20x  10x 10x 10x     QS 10x W/ estim; 30x  10x 10x 10x     SLR w/ eccentric lowering  20x  10x 10x 10x     SLR 10x W/estim; 20x         SAQ           Bridging w/ add sq           LAQ  W/estim; 20x     W/ Lime TB; 30x    Lateral step-squat w/ TBand       Lime; 20x B    stdg SLS toe taps       10x               HSC- seated  Lime TB; 20x     stdg w/ Lime TB; 30x    HS/GS stretch w/ strap    Manual stretch 5 x 30sec hold Manual stretch  5 x 30sec      Stdg GS stretch 5 x 30sec on slant board  5 x 30sec on slant board   5 x 30sec on slant board 3 x 30sec    8\" stairs   2 flights     6x   4\" step-ups and over    30x       6\" step-ups and over    30x    20x B   6\" lateral step-downs        20x B   4\" forward step-downs      2 x 15 L 2 x 10 B    4\" lateral step-downs       2 x 15 L 2 x 10 B                          S/L hip ABD           Clams           Seated LAQ w/ band           Seated HSC w/ band   30x        Stdg TKE Blue tband; 30x/HEP  Lime TB; 30x Blue TB; 30x  Blue TB; 30x  BlueTB; 30x   Resisted side-stepping      Lime; thighs; 50' x 6     Resisted diagonals      Lime; thighs; 50' x 6     Hamstring digs into ball           Shuttle SL 12.5#; 30x   50#; 30x 25#; 30x   50#; 30x   Shuttle B; w  Nuzhat Chris squeeze; w/GTB 50# B RTB; 30x   50#; 10x 50#; 30x   75#; 30x   Sidelying shuttle SL     25#; 20x   50#; 20x   Hip extension shuttle     25#; 20x   50#; 20x   Weight shifting side-side           Weight shifting tandem B           Heel raises           Toe Raises           Stdg hip ABD           stdg SL heel raises L 10x L          stdg SLS 10 x 5 sec L      W/ toe taps; 20x W/ toe taps; 20x   Sit-stand from varying heights working on equal WB B           Sit-stand with L LE bias           Kinesiotape for patellar support           Manual HS/GS stretching into extension  5 x 30sec hold         Supine hip flexor/quad stretching w/ knee flexion  5 x 30sec hold         Gait- HS/TO seq No crutch; no brace No crutch; no brace 500+' working on HS/TO seq, step-length and stance time 500+' working on HS/TO seq, step-length and stance time 200' working on HS/TO seq      Recumbent Stepper L2; 10min L2; 10min L2; 12min L2; 12min  L2; 12min L2; 12min L2; 7min    Recumbent Bike       L1; 7min    High Bike        12min     GAIT TRAINING: (0 minutes): working on HS/TO sequencing using 1 crutch w/out brace donned in clinic only. Also ambulating with brace locked into full extension w/out crutches. MODALITIES: (0 minutes):  Vasopneumatic Cold Compression @ 34deg; med compression w/ B LE elevated on wedge to aide with decreasing pain, inflammation and swelling to improve L knee AROM     [employer] Portal  Access Code: [email or web address]   URL: [email or web address]   Date: 03/08/2021   Prepared by: Leanne Rahman     Exercises   Supine Heel Slide with Small Ball - 10 reps - 2 sets - 2x daily - 7x weekly   Supine Quad Set - 10 reps - 2 sets - 2x daily - 7x weekly   Long Sitting Quad Set with Towel Roll Under Heel - 10 reps - 2 sets - 2x daily - 7x weekly   Sidelying Hip Abduction - 10 reps - 2 sets - 2x daily - 7x weekly   Small Range Straight Leg Raise - 10 reps - 2 sets - 2x daily - 7x weekly   Long Sitting Hamstring Stretch - 10 reps - 1 sets - 2x daily - 7x weekly       Treatment/Session Summary:    · Response to Treatment:  Improved quality/control/fluidity with stair negotiation. She does not have full confidence on descent, but it is improving. Improved strength throughout. Patient is progressing nicely toward current goals. · Communication/Consultation:  Patient encouraged to do squats, SL heel raises and SLS as HEP this week. · Equipment provided today:  HEP w/ instruction sheet  · Recommendations/Intent for next treatment session: Patient will continue on her own for now at her home gym.  If she needs to return within her current POC dates, she will call. If she does not call, she will be DC'd from PT to HEP. Total Treatment Billable Duration: 45 minutes therex  PT Patient Time In/Time Out  Time In: 1030  Time Out: 1115  Marysol Hodge, [place]    No future appointments.

## 2021-05-17 ENCOUNTER — TRANSCRIBE ORDER (OUTPATIENT)
Dept: SCHEDULING | Age: 46
End: 2021-05-17

## 2021-05-17 DIAGNOSIS — R92.2 DENSE BREAST TISSUE ON MAMMOGRAM: Primary | ICD-10-CM

## 2021-05-17 DIAGNOSIS — Z80.3 FAMILY HISTORY OF BREAST CANCER: ICD-10-CM

## 2021-05-19 ENCOUNTER — APPOINTMENT (RX ONLY)
Dept: URBAN - METROPOLITAN AREA CLINIC 349 | Facility: CLINIC | Age: 46
Setting detail: DERMATOLOGY
End: 2021-05-19

## 2021-05-19 DIAGNOSIS — L30.9 DERMATITIS, UNSPECIFIED: ICD-10-CM

## 2021-05-19 DIAGNOSIS — D22 MELANOCYTIC NEVI: ICD-10-CM

## 2021-05-19 DIAGNOSIS — Z85.828 PERSONAL HISTORY OF OTHER MALIGNANT NEOPLASM OF SKIN: ICD-10-CM

## 2021-05-19 DIAGNOSIS — L82.0 INFLAMED SEBORRHEIC KERATOSIS: ICD-10-CM

## 2021-05-19 PROBLEM — D22.71 MELANOCYTIC NEVI OF RIGHT LOWER LIMB, INCLUDING HIP: Status: ACTIVE | Noted: 2021-05-19

## 2021-05-19 PROBLEM — D22.72 MELANOCYTIC NEVI OF LEFT LOWER LIMB, INCLUDING HIP: Status: ACTIVE | Noted: 2021-05-19

## 2021-05-19 PROCEDURE — ? COUNSELING

## 2021-05-19 PROCEDURE — ? SHAVE REMOVAL

## 2021-05-19 PROCEDURE — ? PATHOLOGY BILLING

## 2021-05-19 PROCEDURE — 88305 TISSUE EXAM BY PATHOLOGIST: CPT

## 2021-05-19 PROCEDURE — ? PRESCRIPTION MEDICATION MANAGEMENT

## 2021-05-19 PROCEDURE — A4550 SURGICAL TRAYS: HCPCS

## 2021-05-19 PROCEDURE — ? PRESCRIPTION

## 2021-05-19 PROCEDURE — 11311 SHAVE SKIN LESION 0.6-1.0 CM: CPT

## 2021-05-19 PROCEDURE — 99213 OFFICE O/P EST LOW 20 MIN: CPT | Mod: 25

## 2021-05-19 RX ORDER — TRIAMCINOLONE ACETONIDE 1 MG/G
CREAM TOPICAL
Qty: 1 | Refills: 1 | Status: ERX | COMMUNITY
Start: 2021-05-19

## 2021-05-19 RX ADMIN — TRIAMCINOLONE ACETONIDE: 1 CREAM TOPICAL at 00:00

## 2021-05-19 ASSESSMENT — LOCATION SIMPLE DESCRIPTION DERM
LOCATION SIMPLE: LEFT FOREHEAD
LOCATION SIMPLE: LEFT CHEEK
LOCATION SIMPLE: LEFT CHEEK
LOCATION SIMPLE: LEFT PRETIBIAL REGION
LOCATION SIMPLE: RIGHT PRETIBIAL REGION

## 2021-05-19 ASSESSMENT — LOCATION ZONE DERM
LOCATION ZONE: FACE
LOCATION ZONE: LEG
LOCATION ZONE: FACE

## 2021-05-19 ASSESSMENT — LOCATION DETAILED DESCRIPTION DERM
LOCATION DETAILED: RIGHT DISTAL PRETIBIAL REGION
LOCATION DETAILED: LEFT FOREHEAD
LOCATION DETAILED: LEFT DISTAL PRETIBIAL REGION
LOCATION DETAILED: LEFT CENTRAL MALAR CHEEK
LOCATION DETAILED: LEFT CENTRAL MALAR CHEEK

## 2021-05-19 NOTE — PROCEDURE: PATHOLOGY BILLING
Immunohistochemistry ([id number] and [id number]) billing is not performed here. Please use the Immunohistochemistry Stain Billing plan to accomplish this. Immunohistochemistry ([id number] and [id number]) billing is not performed here. Please use the Immunohistochemistry Stain Billing plan to accomplish this.

## 2021-05-19 NOTE — PROCEDURE: SHAVE REMOVAL
Wound Care: Vaseline
Size Of Lesion In Cm (Required): 1
Add Variable For Additional Medical Justification: No
Was A Bandage Applied: Yes
Notification Instructions: Patient will be notified of biopsy results. However, patient instructed to call the office if not contacted within 2 weeks.
Anesthesia Type: 2% lidocaine with epinephrine
Billing Type: Third-Party Bill
X Size Of Lesion In Cm (Optional): 0
Medical Necessity Information: It is in your best interest to select a reason for this procedure from the list below. All of these items fulfill various CMS LCD requirements except the new and changing color options.
Post-Care Instructions: I reviewed with the patient in detail post-care instructions. Patient is to keep the biopsy site dry overnight, and then apply vaseline twice daily until healed. Patient may apply hydrogen peroxide soaks to remove any crusting. After the procedure, the patient was observed for 5-10 minutes and was oriented to person, place and time and demied feeling dizzy, queasy, and stated that they did not feel that they were going to faint.
Consent was obtained from the patient. The risks and benefits to therapy were discussed in detail. Specifically, the risks of infection, scarring, bleeding, prolonged wound healing, incomplete removal, allergy to anesthesia, nerve injury and recurrence were addressed. Prior to the procedure, the treatment site was clearly identified and confirmed by the patient. All components of Universal Protocol/PAUSE Rule completed.
Biopsy Method: Dermablade
Hemostasis: Electrocautery
Accession #: [id number] read
Detail Level: Detailed
Anesthesia Volume In Cc: 0.5
Medical Necessity Clause: This procedure was medically necessary because the lesion that was treated was: changing

## 2021-06-01 NOTE — THERAPY DISCHARGE
Jennifer Ferrera  : 1975  Primary: [facility]  Secondary:  [facility]  [facility] [address], [place] [address]  [phone]   [phone]       OUTPATIENT PHYSICAL THERAPY:Discontinuation Summary 2021   ICD-10: Treatment Diagnosis: (M62.81) muscle weakness; (R 26.2) Difficulty Walking; (M25.562) L knee pain  Precautions/Allergies:   Patient has no known allergies. TREATMENT PLAN:  Effective Dates: 3/8/2021 TO 2021 (90 days). Frequency/Duration: 2-3 times a week for 90 Day(s)     MEDICAL/REFERRING DIAGNOSIS:  Pain in left knee [M25.562]   DATE OF ONSET: DOI: 20; DOS: 3-5-21  REFERRING PHYSICIAN: Kelley Reza MD MD Orders: Evaluate and Treat  Return MD Appointment: None Scheduled Yet     Jennifer Ferrera has been seen in physical therapy from 21 to 21 for 21 visits both pre ( 7 visits) and post (14 visits) ACL reconstruction. Treatment has been discontinued at this time due to patient feeling she was doing well enough to continue on her own in her personal gym at home. and patient failing to return for additional treatment. The below goals were met prior to discontinuation. Some goals were not met due to not being assessed because she did not return. Patient had minimal pain at last visit, and her L knee AROM and strength [facility]. She was ambulating without antalgia. Patient is independent and compliant with her HEP. I worry sometimes that she \"over does it\". Please see last daily treatment note for detailed progress made. Patient is DC'd from PT to HEP at this time. Thank you for this referral.        PROBLEM LIST (Impacting functional limitations):  1. Decreased Strength  2. Decreased ADL/Functional Activities  3. Decreased Transfer Abilities  4. Decreased Ambulation Ability/Technique  5. Decreased Balance  6. Increased Pain  7.  Decreased Flexibility/Joint Mobility  8. Edema/Girth  9. Decreased Knowledge of Precautions  10. Decreased [place] with Home Exercise Program INTERVENTIONS PLANNED: (Treatment may consist of any combination of the following)  1. Balance Exercise  2. Gait Training  3. Heat  4. Home Exercise Program (HEP)  5. Manual Therapy  6. Neuromuscular Re-education/Strengthening  7. Therapeutic Exercise/Strengthening  8. Kinesiotaping   TREATMENT PLAN:  GOALS: (Goals have been discussed and agreed upon with patient.)  Short-Term Functional Goals: Time Frame: 4-6 weeks (4-19-21)  1. Pt will be compliant and independent with HEP.----------------------------------------------------------MET  2. Pt will improve score on the LEFS to 40/80 to increase pt's overall functional mobility.-----------NOT ASSESSED  3. Pt will improve L knee AROM to 5-110deg to increase pt's ease with transfers and gait.-----------------MET  4. Pt will be able to sit-stand from standard height without use of UE or compensatory weight shifting to rise.-------------MET  5. Pt will be able to ambulate with a near normal gait pattern (HS/TO seq) for community distances with LRD or no AD. --------MET  6. Pt will subjectively report decreased frequency (1x/night) of waking due to L knee pain.--------------------------MET  7. Patient will be able to SLS on L x > 5sec. ---------------------------------------------MET  8. Patient will perform an unassisted SLR with good form and control. -------------------------------MET    Discharge Goals: Time Frame: 8-12 weeks (6-8-21)  1. Pt will improve score on the LEFS to >60/80 to increase pt's overall functional mobility.------------------------NOT ASSESSED  2. Pt will improve L knee AROM to 0-125deg to increase pt's ease with transfers, gait and balance. -------------------------------Ongoing  3. Pt will be able to sit-stand from toilet height w/out use of UE for assistance to rise.--------------------------------------------MET  4.    Pt will be able to ascend/descend 6-8\" steps reciprocally  with use of a rail with good form and control. --------------------------MET  5. Pt will ambulate with a normal gait pattern over level and unlevel surfaces with no AD to increase pt's overall functional mobility.-----------------------------------------------------------------------------------------------------------------------------------------------------MET  6. Pt will lower fall risk by 1 point. ----------------------------------------------MET  7.    Pt will be DC'd from PT to HEP.----------------------------------------------MET Anyi Dahl PT

## 2021-08-02 ENCOUNTER — HOSPITAL ENCOUNTER (OUTPATIENT)
Dept: MRI IMAGING | Age: 46
Discharge: HOME | End: 2021-08-02
Payer: COMMERCIAL

## 2021-08-02 DIAGNOSIS — R92.2 DENSE BREAST TISSUE ON MAMMOGRAM: ICD-10-CM

## 2021-08-02 DIAGNOSIS — Z80.3 FAMILY HISTORY OF BREAST CANCER: ICD-10-CM

## 2021-08-02 PROCEDURE — A9576 INJ PROHANCE MULTIPACK: HCPCS

## 2021-08-02 PROCEDURE — 77049 MRI BREAST C-+ W/CAD BI: CPT

## 2021-08-02 PROCEDURE — 74011636320 HC RX REV CODE- 636/320

## 2021-08-02 RX ORDER — SODIUM CHLORIDE 0.9 % (FLUSH) 0.9 %
10 SYRINGE (ML) INJECTION
Status: COMPLETED | OUTPATIENT
Start: 2021-08-02 | End: 2021-08-02

## 2021-08-02 RX ADMIN — GADOTERIDOL 15 ML: 279.3 INJECTION, SOLUTION INTRAVENOUS at 12:44

## 2021-08-02 RX ADMIN — Medication 10 ML: at 12:44

## 2021-10-27 ENCOUNTER — RX ONLY (OUTPATIENT)
Age: 46
Setting detail: RX ONLY
End: 2021-10-27

## 2021-10-27 RX ORDER — KETOCONAZOLE 20 MG/ML
SHAMPOO, SUSPENSION TOPICAL
Qty: 120 | Refills: 0 | Status: ERX | COMMUNITY
Start: 2021-10-27

## 2021-11-12 NOTE — PROGRESS NOTES
Whitney Govea  : 1975  Primary: [facility]  Secondary:  [facility] at [place] [address], [address], [place] [address]  [phone]   [phone]      OUTPATIENT PHYSICAL THERAPY: Daily Treatment Note 3/22/2021  ICD-10: Treatment Diagnosis: (M62.81) muscle weakness; (R 26.2) Difficulty Walking; (M25.562) L knee pain  Precautions/Allergies:   Patient has no known allergies. TREATMENT PLAN:  Effective Dates: 3/8/2021 TO 2021 (90 days). Frequency/Duration: 2 times a week for 90 Day(s)     MEDICAL/REFERRING DIAGNOSIS:  Pain in left knee [M25.562]   DATE OF ONSET: DOI: 20  REFERRING PHYSICIAN: Alban Whitehead MD MD Orders: Evaluate and Treat  Return MD Appointment: None Scheduled Yet     Pre-treatment Symptoms/Complaints:  Patient reports improved overall swelling with using her home [address] [facility] unit. She states more pain under her patella. She states her L knee \"feels tight\". She reports daily compliance with HEP. Pain: Initial: Pain Intensity 1: 3  Pain Location 1: Knee  Pain Orientation 1: Left  Post Session:  2-3/10 soreness and fatigue    Medications Last Reviewed:  3/22/2021    Updated Objective Findings:  3-22-21: L knee AROM on entry: 7deg-88deg; After treatment: 3deg-102deg Patient still ambulating with brace locked into extension as she has not yet achieved full extension and still has 5deg extensor lag. TREATMENT:   THERAPEUTIC EXERCISE: (45 minutes):  Exercises per grid below to improve mobility, strength, balance and coordination. Required moderate visual, verbal and tactile cues to promote proper body alignment, promote proper body posture, promote proper body mechanics and promote proper body breathing techniques. Progressed resistance, range, repetitions and complexity of movement as indicated.      Date:  21 Date:  21 Date:  3/8/21 Date:  3/9/21 Date:  3/11/21 Date:  3/15/21 Date:  3/17/21 Date:  3/22/21   Activity/Exercise           AP           Ankle Circles           Heel Slides   15x 20x 20x W/ ball and toe tap; 50x W/ toe tap; 20x W/ ball; 20x  W/ toe tap; 20x   QS   20 x 5sec 20 x 5sec 20 x 5sec 30 x 10sec w/ estim 10x W/estim; 20x   SLR w/ eccentric lowering   10x 20x 20x 30 x 10sec w/estim     SLR   15x 20x 20x 10x 20x W/estim; 20x   SAQ           Bridging w/ add sq     20x 10x  20x   LAQ        W/estim; 20x   HSC- seated        20x   HS/GS stretch w/ strap           Stdg GS stretch 5 x 30sec on board 5 x 30sec on board 5 x 30sec w/ strap 5 x 30sec w/ strap 5 x 30sec w/ strap 5 x 30sec w/ strap and 5 x 30sec on slant board 5 x 30sec w/ strap and 5 x 30sec on slant board 5 x 30sec w/ strap   S/L hip ABD   15x 20x 20x 30 x 10sec w/ estim  20x   Clams           Seated LAQ w/ band RTB; 20x          Seated HSC w/ band RTB; 20x          Stdg TKE RTB; 20x          Resisted side-stepping RTB; thighs; 50' x 4 RTB; thighs; 50' x 6         Resisted diagonals  RTB; thighs; 50' x 4         Hamstring digs into ball     20x      Shuttle SL  50#; 20x L         Shuttle B; w  Ball squeeze; w/GTB 62.5#; 40# 75#; 40x B     50#; 30x    Weight shifting side-side    20x B       Weight shifting tandem B    20x B       Heel raises    20x B 10x 30x B 30x B    Toe Raises    20x B  30x B 30x B    Stdg hip ABD    20x B  10x B 10x B    stdg SL heel raises L      5x 5x    stdg SLS      5 x 5-10sec B     Sit-stand from varying heights working on equal WB B      20x     Sit-stand with L LE bias      5x     Kinesiotape for patellar support    Quad facilitation       Gait- HS/TO seq    In // bars and in clinic w/ 1 crutch; 1 crutch in clinic See below W/ brace and w/out crutches; 200' 1 crutch; no brace in clinic. Recumbent Stepper L2.5; 12min L3; 9min     L1; 10min L2; 10min     GAIT TRAINING: (0 minutes): working on HS/TO sequencing using 1 crutch w/out brace donned in clinic only.  Also ambulating with brace locked into full extension w/out crutches. MODALITIES: (0 minutes):  Vasopneumatic Cold Compression @ 34deg; med compression w/ B LE elevated on wedge to aide with decreasing pain, inflammation and swelling to improve L knee AROM     [employer] Portal  Access Code: [email or web address]   URL: [email or web address]   Date: 03/08/2021   Prepared by: Cristofer Velásquez     Exercises   Supine Heel Slide with Small Ball - 10 reps - 2 sets - 2x daily - 7x weekly   Supine Quad Set - 10 reps - 2 sets - 2x daily - 7x weekly   Long Sitting Quad Set with Towel Roll Under Heel - 10 reps - 2 sets - 2x daily - 7x weekly   Sidelying Hip Abduction - 10 reps - 2 sets - 2x daily - 7x weekly   Small Range Straight Leg Raise - 10 reps - 2 sets - 2x daily - 7x weekly   Long Sitting Hamstring Stretch - 10 reps - 1 sets - 2x daily - 7x weekly       Treatment/Session Summary:    · Response to Treatment:  Patient's L knee AROM hasn't changed much in the last week, however, improved quad contraction and power noted with improved quality of SLR. · Communication/Consultation:   Patient encouraged to perform more gait and WB activities at home. Brace locked out at 0deg due to mild extensor lag. No signs of infection. HEP 2-3x daily. Ice 2-3x daily. · Equipment provided today:  HEP w/ instruction sheet  · Recommendations/Intent for next treatment session: Next visit will focus on L knee AROM, strength, balance and gait.  MD f/u 3/23/21    Total Treatment Billable Duration: 45 min therex  PT Patient Time In/Time Out  Time In: 0945  Time Out: 420 Haley Bland, PT    Future Appointments   Date Time Provider Daisha Vazquez   3/24/2021  9:45 AM Brent Marquis PT [facility]   4/6/2021 10:30 AM Brent Marquis PT [facility]   4/8/2021  8:15 AM Brent Marquis PT [facility]   4/13/2021 10:30 AM Brent Marquis PT [facility]   4/15/2021 10:30 AM Brent Marquis PT [facility]   4/19/2021 10:30 AM Tripp Jamil, [facility]   4/22/2021 10:30 AM Tripp Jamil, PT [facility]   4/27/2021 10:30 AM Tripp Jamil, [facility]   4/29/2021 10:30 AM Kayce Noel, [facility] And W Plasty Text: The defect edges were debeveled with a #15 scalpel blade.  The primary defect was closed partially with a complex linear closure.  Given the location of the remaining defect, shape of the defect and the proximity to free margins a W plasty was deemed most appropriate for complete closure of the defect.  Using a sterile surgical marker, an appropriate advancement flap was drawn incorporating the defect and placing the expected incisions within the relaxed skin tension lines where possible.    The area thus outlined was incised deep to adipose tissue with a #15 scalpel blade.  The skin margins were undermined to an appropriate distance in all directions utilizing iris scissors.

## 2021-11-18 ENCOUNTER — APPOINTMENT (RX ONLY)
Dept: URBAN - METROPOLITAN AREA CLINIC 349 | Facility: CLINIC | Age: 46
Setting detail: DERMATOLOGY
End: 2021-11-18

## 2021-11-18 DIAGNOSIS — Z85.828 PERSONAL HISTORY OF OTHER MALIGNANT NEOPLASM OF SKIN: ICD-10-CM

## 2021-11-18 DIAGNOSIS — L82.0 INFLAMED SEBORRHEIC KERATOSIS: ICD-10-CM

## 2021-11-18 DIAGNOSIS — Z12.83 ENCOUNTER FOR SCREENING FOR MALIGNANT NEOPLASM OF SKIN: ICD-10-CM

## 2021-11-18 DIAGNOSIS — D18.0 HEMANGIOMA: ICD-10-CM

## 2021-11-18 DIAGNOSIS — L81.4 OTHER MELANIN HYPERPIGMENTATION: ICD-10-CM

## 2021-11-18 DIAGNOSIS — D22 MELANOCYTIC NEVI: ICD-10-CM

## 2021-11-18 DIAGNOSIS — L82.1 OTHER SEBORRHEIC KERATOSIS: ICD-10-CM

## 2021-11-18 PROBLEM — D22.72 MELANOCYTIC NEVI OF LEFT LOWER LIMB, INCLUDING HIP: Status: ACTIVE | Noted: 2021-11-18

## 2021-11-18 PROBLEM — D18.01 HEMANGIOMA OF SKIN AND SUBCUTANEOUS TISSUE: Status: ACTIVE | Noted: 2021-11-18

## 2021-11-18 PROBLEM — D22.71 MELANOCYTIC NEVI OF RIGHT LOWER LIMB, INCLUDING HIP: Status: ACTIVE | Noted: 2021-11-18

## 2021-11-18 PROCEDURE — 17110 DESTRUCTION B9 LES UP TO 14: CPT

## 2021-11-18 PROCEDURE — 99213 OFFICE O/P EST LOW 20 MIN: CPT | Mod: 25

## 2021-11-18 PROCEDURE — ? LIQUID NITROGEN

## 2021-11-18 PROCEDURE — ? COUNSELING

## 2021-11-18 ASSESSMENT — LOCATION SIMPLE DESCRIPTION DERM
LOCATION SIMPLE: ABDOMEN
LOCATION SIMPLE: RIGHT BREAST
LOCATION SIMPLE: CHEST
LOCATION SIMPLE: RIGHT UPPER BACK
LOCATION SIMPLE: LEFT FOREHEAD
LOCATION SIMPLE: RIGHT PRETIBIAL REGION
LOCATION SIMPLE: LEFT UPPER BACK
LOCATION SIMPLE: LEFT PRETIBIAL REGION

## 2021-11-18 ASSESSMENT — LOCATION DETAILED DESCRIPTION DERM
LOCATION DETAILED: LEFT FOREHEAD
LOCATION DETAILED: LEFT SUPERIOR UPPER BACK
LOCATION DETAILED: RIGHT MEDIAL BREAST 1-2:00 REGION
LOCATION DETAILED: RIGHT DISTAL PRETIBIAL REGION
LOCATION DETAILED: RIGHT MEDIAL BREAST 5-6:00 REGION
LOCATION DETAILED: LEFT INFERIOR MEDIAL UPPER BACK
LOCATION DETAILED: SUBXIPHOID
LOCATION DETAILED: RIGHT MEDIAL UPPER BACK
LOCATION DETAILED: LEFT RIB CAGE
LOCATION DETAILED: LEFT DISTAL PRETIBIAL REGION
LOCATION DETAILED: RIGHT LATERAL SUPERIOR CHEST

## 2021-11-18 ASSESSMENT — LOCATION ZONE DERM
LOCATION ZONE: TRUNK
LOCATION ZONE: FACE
LOCATION ZONE: LEG

## 2021-11-18 NOTE — PROCEDURE: LIQUID NITROGEN
Duration Of Freeze Thaw-Cycle (Seconds): 2
Medical Necessity Information: It is in your best interest to select a reason for this procedure from the list below. All of these items fulfill various CMS LCD requirements except the new and changing color options.
Add 52 Modifier (Optional): no
Detail Level: Detailed
Show Aperture Variable?: Yes
Consent: The patient's consent was obtained including but not limited to risks of crusting, scabbing, blistering, scarring, darker or lighter pigmentary change, recurrence, incomplete removal and infection.
Medical Necessity Clause: This procedure was medically necessary because the lesions that were treated were:
Post-Care Instructions: I reviewed with the patient in detail post-care instructions. Patient is to wear sunprotection, and avoid picking at any of the treated lesions. Pt may apply Vaseline to crusted or scabbing areas.
Number Of Freeze-Thaw Cycles: 3 freeze-thaw cycles

## 2021-12-29 ENCOUNTER — RX ONLY (OUTPATIENT)
Age: 46
Setting detail: RX ONLY
End: 2021-12-29

## 2021-12-29 RX ORDER — KETOCONAZOLE 20 MG/ML
SHAMPOO, SUSPENSION TOPICAL
Qty: 120 | Refills: 0 | Status: ERX

## 2022-02-04 ENCOUNTER — TRANSCRIBE ORDER (OUTPATIENT)
Dept: SCHEDULING | Age: 47
End: 2022-02-04

## 2022-02-04 DIAGNOSIS — Z12.31 VISIT FOR SCREENING MAMMOGRAM: Primary | ICD-10-CM

## 2022-02-14 ENCOUNTER — HOSPITAL ENCOUNTER (OUTPATIENT)
Dept: MAMMOGRAPHY | Age: 47
Discharge: HOME | End: 2022-02-14
Attending: PHYSICIAN ASSISTANT
Payer: COMMERCIAL

## 2022-02-14 DIAGNOSIS — Z12.31 VISIT FOR SCREENING MAMMOGRAM: ICD-10-CM

## 2022-02-14 PROCEDURE — 77063 BREAST TOMOSYNTHESIS BI: CPT

## 2022-02-21 ENCOUNTER — RX ONLY (OUTPATIENT)
Age: 47
Setting detail: RX ONLY
End: 2022-02-21

## 2022-02-21 RX ORDER — KETOCONAZOLE 20 MG/ML
SHAMPOO, SUSPENSION TOPICAL
Qty: 120 | Refills: 4 | Status: ERX

## 2022-02-21 RX ORDER — KETOCONAZOLE 20 MG/ML
SHAMPOO, SUSPENSION TOPICAL
Qty: 120 | Refills: 4 | Status: ERX | COMMUNITY
Start: 2022-02-21

## 2022-04-12 ENCOUNTER — APPOINTMENT (RX ONLY)
Dept: URBAN - METROPOLITAN AREA CLINIC 329 | Facility: CLINIC | Age: 47
Setting detail: DERMATOLOGY
End: 2022-04-12

## 2022-04-12 DIAGNOSIS — L81.4 OTHER MELANIN HYPERPIGMENTATION: ICD-10-CM

## 2022-04-12 DIAGNOSIS — L57.0 ACTINIC KERATOSIS: ICD-10-CM | Status: INADEQUATELY CONTROLLED

## 2022-04-12 PROCEDURE — 99212 OFFICE O/P EST SF 10 MIN: CPT | Mod: 25

## 2022-04-12 PROCEDURE — ? LIQUID NITROGEN

## 2022-04-12 PROCEDURE — ? COUNSELING

## 2022-04-12 PROCEDURE — 17000 DESTRUCT PREMALG LESION: CPT

## 2022-04-12 PROCEDURE — 17003 DESTRUCT PREMALG LES 2-14: CPT

## 2022-04-12 ASSESSMENT — LOCATION ZONE DERM
LOCATION ZONE: TRUNK
LOCATION ZONE: SCALP
LOCATION ZONE: FACE

## 2022-04-12 ASSESSMENT — LOCATION DETAILED DESCRIPTION DERM
LOCATION DETAILED: LEFT MEDIAL FRONTAL SCALP
LOCATION DETAILED: LEFT MEDIAL SUPERIOR CHEST
LOCATION DETAILED: LEFT FOREHEAD

## 2022-04-12 ASSESSMENT — LOCATION SIMPLE DESCRIPTION DERM
LOCATION SIMPLE: LEFT FOREHEAD
LOCATION SIMPLE: LEFT SCALP
LOCATION SIMPLE: CHEST

## 2022-07-11 ENCOUNTER — APPOINTMENT (RX ONLY)
Dept: URBAN - METROPOLITAN AREA CLINIC 329 | Facility: CLINIC | Age: 47
Setting detail: DERMATOLOGY
End: 2022-07-11

## 2022-07-11 DIAGNOSIS — L82.1 OTHER SEBORRHEIC KERATOSIS: ICD-10-CM

## 2022-07-11 DIAGNOSIS — Z85.828 PERSONAL HISTORY OF OTHER MALIGNANT NEOPLASM OF SKIN: ICD-10-CM

## 2022-07-11 DIAGNOSIS — L57.0 ACTINIC KERATOSIS: ICD-10-CM | Status: RESOLVED

## 2022-07-11 PROCEDURE — 99213 OFFICE O/P EST LOW 20 MIN: CPT

## 2022-07-11 PROCEDURE — ? OTHER

## 2022-07-11 PROCEDURE — ? ADDITIONAL NOTES

## 2022-07-11 PROCEDURE — ? FULL BODY SKIN EXAM - DECLINED

## 2022-07-11 PROCEDURE — ? COUNSELING

## 2022-07-11 ASSESSMENT — LOCATION SIMPLE DESCRIPTION DERM
LOCATION SIMPLE: LEFT FOREHEAD
LOCATION SIMPLE: POSTERIOR NECK
LOCATION SIMPLE: LEFT SCALP

## 2022-07-11 ASSESSMENT — LOCATION DETAILED DESCRIPTION DERM
LOCATION DETAILED: LEFT MEDIAL FRONTAL SCALP
LOCATION DETAILED: LEFT LATERAL TRAPEZIAL NECK
LOCATION DETAILED: LEFT FOREHEAD

## 2022-07-11 ASSESSMENT — LOCATION ZONE DERM
LOCATION ZONE: SCALP
LOCATION ZONE: FACE
LOCATION ZONE: NECK

## 2022-07-11 NOTE — PROCEDURE: OTHER
Other (Free Text): Patient had lesions treated with LN2 at last visit. Both lesions appear to have resolved.
Note Text (......Xxx Chief Complaint.): This diagnosis correlates with the
Render Risk Assessment In Note?: no
Detail Level: Zone
Other (Free Text): Reassured patient that lesion is benign. \\n\\nPatient also has some lesions on her face. Suggested patient set up a consultation with [place] to discuss treatment options.

## 2022-07-11 NOTE — PROCEDURE: MIPS QUALITY
Quality 226: Preventive Care And Screening: Tobacco Use: Screening And Cessation Intervention: Patient screened for tobacco use and is an ex/non-smoker
Detail Level: Generalized
Quality 110: Preventive Care And Screening: Influenza Immunization: Influenza immunization was not ordered or administered, reason not given
Quality 431: Preventive Care And Screening: Unhealthy Alcohol Use - Screening: Patient not identified as an unhealthy alcohol user when screened for unhealthy alcohol use using a systematic screening method

## 2022-07-11 NOTE — HPI: SKIN LESION
How Severe Is Your Skin Lesion?: mild
Is This A New Presentation, Or A Follow-Up?: Skin Lesion
Which Family Member (Optional)?: Mother
Additional History: Patient stated she has noticed a lesion on her left shoulder. She is unsure of how long it has been present.

## 2022-10-17 ENCOUNTER — APPOINTMENT (RX ONLY)
Dept: URBAN - METROPOLITAN AREA CLINIC 329 | Facility: CLINIC | Age: 47
Setting detail: DERMATOLOGY
End: 2022-10-17

## 2022-10-17 DIAGNOSIS — L57.0 ACTINIC KERATOSIS: ICD-10-CM | Status: INADEQUATELY CONTROLLED

## 2022-10-17 DIAGNOSIS — D18.0 HEMANGIOMA: ICD-10-CM

## 2022-10-17 DIAGNOSIS — L82.1 OTHER SEBORRHEIC KERATOSIS: ICD-10-CM

## 2022-10-17 DIAGNOSIS — D22 MELANOCYTIC NEVI: ICD-10-CM

## 2022-10-17 DIAGNOSIS — L81.4 OTHER MELANIN HYPERPIGMENTATION: ICD-10-CM

## 2022-10-17 PROBLEM — D22.5 MELANOCYTIC NEVI OF TRUNK: Status: ACTIVE | Noted: 2022-10-17

## 2022-10-17 PROBLEM — D18.01 HEMANGIOMA OF SKIN AND SUBCUTANEOUS TISSUE: Status: ACTIVE | Noted: 2022-10-17

## 2022-10-17 PROCEDURE — 99213 OFFICE O/P EST LOW 20 MIN: CPT | Mod: 25

## 2022-10-17 PROCEDURE — ? TREATMENT REGIMEN

## 2022-10-17 PROCEDURE — 17000 DESTRUCT PREMALG LESION: CPT

## 2022-10-17 PROCEDURE — ? COUNSELING

## 2022-10-17 PROCEDURE — 17003 DESTRUCT PREMALG LES 2-14: CPT

## 2022-10-17 PROCEDURE — ? LIQUID NITROGEN

## 2022-10-17 ASSESSMENT — LOCATION SIMPLE DESCRIPTION DERM
LOCATION SIMPLE: LEFT SCALP
LOCATION SIMPLE: CHEST
LOCATION SIMPLE: RIGHT BREAST
LOCATION SIMPLE: RIGHT UPPER BACK
LOCATION SIMPLE: UPPER BACK
LOCATION SIMPLE: RIGHT SCALP
LOCATION SIMPLE: RIGHT UPPER ARM
LOCATION SIMPLE: FRONTAL SCALP

## 2022-10-17 ASSESSMENT — LOCATION DETAILED DESCRIPTION DERM
LOCATION DETAILED: SUPERIOR THORACIC SPINE
LOCATION DETAILED: RIGHT MEDIAL BREAST 1-2:00 REGION
LOCATION DETAILED: UPPER STERNUM
LOCATION DETAILED: RIGHT MEDIAL FRONTAL SCALP
LOCATION DETAILED: MEDIAL FRONTAL SCALP
LOCATION DETAILED: RIGHT CENTRAL FRONTAL SCALP
LOCATION DETAILED: LEFT MEDIAL FRONTAL SCALP
LOCATION DETAILED: RIGHT SUPERIOR UPPER BACK
LOCATION DETAILED: LEFT CENTRAL FRONTAL SCALP
LOCATION DETAILED: RIGHT ANTERIOR MEDIAL DISTAL UPPER ARM

## 2022-10-17 ASSESSMENT — LOCATION ZONE DERM
LOCATION ZONE: TRUNK
LOCATION ZONE: ARM
LOCATION ZONE: SCALP

## 2022-11-16 ENCOUNTER — RX ONLY (OUTPATIENT)
Age: 47
Setting detail: RX ONLY
End: 2022-11-16

## 2022-11-16 RX ORDER — KETOCONAZOLE 20 MG/ML
SHAMPOO, SUSPENSION TOPICAL
Qty: 120 | Refills: 4 | Status: ERX

## 2023-05-10 RX ORDER — MELOXICAM 7.5 MG/1
7.5 TABLET ORAL DAILY
COMMUNITY

## 2023-11-27 ENCOUNTER — HOSPITAL ENCOUNTER (OUTPATIENT)
Dept: MAMMOGRAPHY | Age: 48
Discharge: HOME | End: 2023-11-30
Payer: COMMERCIAL

## 2023-11-27 VITALS — BODY MASS INDEX: 26.61 KG/M2 | WEIGHT: 155 LBS

## 2023-11-27 DIAGNOSIS — Z12.31 VISIT FOR SCREENING MAMMOGRAM: ICD-10-CM

## 2023-11-27 PROCEDURE — 77063 BREAST TOMOSYNTHESIS BI: CPT

## 2025-05-21 ENCOUNTER — OFFICE VISIT (OUTPATIENT)
Age: 50
End: 2025-05-21
Payer: COMMERCIAL

## 2025-05-21 DIAGNOSIS — M53.3 PAIN OF LEFT SACROILIAC JOINT: Primary | ICD-10-CM

## 2025-05-21 DIAGNOSIS — Q76.49 BERTOLOTTI'S SYNDROME: ICD-10-CM

## 2025-05-21 DIAGNOSIS — M79.672 LEFT FOOT PAIN: ICD-10-CM

## 2025-05-21 PROCEDURE — 99203 OFFICE O/P NEW LOW 30 MIN: CPT | Performed by: PHYSICIAN ASSISTANT

## 2025-05-21 RX ORDER — CETIRIZINE HYDROCHLORIDE 10 MG/1
TABLET ORAL
COMMUNITY

## 2025-05-21 RX ORDER — FLUTICASONE PROPIONATE 50 MCG
SPRAY, SUSPENSION (ML) NASAL
COMMUNITY

## 2025-05-21 RX ORDER — ESCITALOPRAM OXALATE 10 MG/1
TABLET ORAL
COMMUNITY
Start: 2025-05-15

## 2025-05-21 RX ORDER — KETOCONAZOLE 20 MG/ML
SHAMPOO, SUSPENSION TOPICAL
COMMUNITY
Start: 2025-03-16

## 2025-05-21 NOTE — PROGRESS NOTES
A referral to an Ankle/foot specialist has been placed. An order for PT on the Lumbar Spine has been entered.    
lumbar spine: I do not have lumbar imaging but the report reads normal lumbar x-ray    3 views sacroiliac joints reveal sacralization of the left last lumbar vertebra transverse process bilaterally.  Mild SI joint arthritis at show a little more prominent on the right compared to the left.  There is also right facet arthropathy and left facet arthropathy lower lumbar spine.    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 Pain of left sacroiliac joint  XR SACROILIAC JOINTS (MIN 3 VIEWS)      2. Bertolotti's syndrome        3. Left foot pain            Assessment & Plan  SI joint pain/Bertolotti syndrome:     Refer to physical therapy for SI joint treatment. Continue anti-inflammatory medications. Consider injections under fluoroscopy or ultrasound if necessary.    Left foot pain, unrelated to lumbar spine:    Refer to foot and ankle for evaluation and management.             Bertolotti's Syndrome    Bertolotti's syndrome is characterized by sacralization of the lowest lumbar vertebral body and lumbarization of the uppermost sacral segment. It involves a total or partial unilateral or bilateral fusion of the transverse process of the lowest lumbar vertebra to the sacrum, leading to the formation of a transitional 5th lumbar vertebra. Of importance is that this syndrome will result in a pain generating 4th lumbar disc resulting in a \"sciatic\" type of a pain correlating to the 5th lumbar nerve root. Usually the transitional vertebra will have a \"spatulated\" transverse process on one side resulting in articulation or partial articulation with the sacrum or at time the ilium and in some cases with both. This results in limited / altered motion at the lumbo-sacral articulation. This loss of motion will then be compensated for at segments superior to the transitional vertebra resulting in accelerated

## 2025-05-21 NOTE — PATIENT INSTRUCTIONS
Bertolotti's Syndrome    Bertolotti's syndrome is characterized by sacralization of the lowest lumbar vertebral body and lumbarization of the uppermost sacral segment. It involves a total or partial unilateral or bilateral fusion of the transverse process of the lowest lumbar vertebra to the sacrum, leading to the formation of a transitional 5th lumbar vertebra. Of importance is that this syndrome will result in a pain generating 4th lumbar disc resulting in a \"sciatic\" type of a pain correlating to the 5th lumbar nerve root. Usually the transitional vertebra will have a \"spatulated\" transverse process on one side resulting in articulation or partial articulation with the sacrum or at time the ilium and in some cases with both. This results in limited / altered motion at the lumbo-sacral articulation. This loss of motion will then be compensated for at segments superior to the transitional vertebra resulting in accelerated degeneration and strain through the L4 disc level which can become symptomatic and inflame the adjacent L5 nerve root resulting in \"sciatic\" or radicular pain patterns. Scoliosis is frequently found to be associated.

## 2025-05-30 NOTE — PROGRESS NOTES
Name: Latasha Troncoso Giambalvo  YOB: 1975  Gender: female  MRN: [id number]    CC: Left foot pain    HPI:   06/02/2025: Initial visit: Left dorsal foot pain: ~ 3 month duration: Intermittent severity of sharp shooting burning pain    ROS/Meds/PSH/PMH/FH/SH: Only reviewed pertinent/relevant information      Tobacco:  reports that she has never smoked. She has never used smokeless tobacco.     Reviewed Test/Records/Documents:   05/21/2023: Ms. Suze Paul: PA: Left foot sharp pain in arch distinct from plantar fasciitis.  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  No obvious distress or SOB  CV: Left LE = acceptable appearing vascular flow, color, capillary refill   Neuro: Left LE = appears to have intact light touch sensation   Skin: Left LE = dorsal midfoot/proximal metatarsal thickening  MS: Standing: Plantigrade: Gait follow-up  Left = mild insertional ATT pain; no ATTD  Left = 2-3 metatarsal base pain > soft tissue or neuralgic pain  Left = no ankle, hindfoot or TMT pain  Left = full ankle/foot motion; 5/5 strength    XR: Left: Standing AP lateral mortise ankle plus AP oblique foot taken today calcaneocuboid with arthritis; dorsal os naviculare; bipartite tibial sesamoid; mild tarsometatarsal arthritis  XR Impression:  As above       Injection: Not applicable    Assessment:    Left dorsal foot pain, possible 2-3 metatarsal base stress reactions  Left mild insertional anterior tibial tendinitis    Plan:   The patient and I discussed the above assessment. We explored treatment options.     06/02/2025: Initial visit:  > 3-month history dorsal foot pain  Mild insertional anterior tibial tendinitis without deficiency  2-3 metatarsal base pain

## 2025-06-02 ENCOUNTER — OFFICE VISIT (OUTPATIENT)
Dept: ORTHOPEDIC SURGERY | Age: 50
End: 2025-06-02
Payer: COMMERCIAL

## 2025-06-02 DIAGNOSIS — M79.672 LEFT FOOT PAIN: Primary | ICD-10-CM

## 2025-06-02 PROCEDURE — 99213 OFFICE O/P EST LOW 20 MIN: CPT | Performed by: ORTHOPAEDIC SURGERY

## 2025-06-02 PROCEDURE — L4360 PNEUMAT WALKING BOOT PRE CST: HCPCS | Performed by: ORTHOPAEDIC SURGERY

## 2025-06-02 PROCEDURE — M5017 MISC EVENUP: HCPCS | Performed by: ORTHOPAEDIC SURGERY

## 2025-06-02 NOTE — PROGRESS NOTES
Patient was fitted and instructed on an Even Up for the right foot.  The patient was prescribed a walker boot for the patient's left foot. The patient wears a size 6.5 shoe and I fitted them with a S size boot. The patient was fitted and instructed on the use of prescribed walker boot by a Registered Orthopedic Technician. I explained how to fit themselves and that the plastic flexible piece should always be on the front of the boot and secured by the Velcro straps on top. The air bladder in the boot was adjusted according to proper fit and comfort. The patient walked a short distance and acknowledged satisfaction with current fit. I also explained that they need a heel lift or a higher heeled shoe for the uninvolved LE to help normalize gait and avoid excessive low back stress/strain due to leg length inequality created from walker boot.Patient read and signed documenting they understand and agree to [facility]'s current DME return policy.

## 2025-06-26 NOTE — PROGRESS NOTES
Name: Latasha Troncoso Giambalvo  YOB: 1975  Gender: female  MRN: [id number]    06/27/2025: 3D boot did not work with her activity level.  Pain isolates to medial foot    HPI:   06/02/2025: Initial visit: Left dorsal foot pain: ~ 3 month duration: Intermittent severity of sharp shooting burning pain    ROS/Meds/PSH/PMH/FH/SH: Only reviewed pertinent/relevant information      Tobacco:  reports that she has never smoked. She has never used smokeless tobacco.     Reviewed Test/Records/Documents:   05/21/2023: Ms. Suze Paul: PA: Left foot sharp pain in arch distinct from plantar fasciitis.  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  No obvious distress or SOB  CV: Left LE = acceptable appearing vascular flow, color, capillary refill   Neuro: Left LE = appears to have intact light touch sensation   Skin: Left LE = no apparent soft tissue swelling  MS: Standing: Plantigrade: Gait full in regular shoes   Left = direct insertional ATT pain; no ATTD  Left = no midfoot or metatarsal base pain    Left = no ankle, hindfoot pain  Left = full ankle/foot motion; 5/5 strength    XR: Left: Standing AP lateral mortise ankle plus AP oblique foot taken 06/02/2025 calcaneocuboid with arthritis; dorsal os naviculare; bipartite tibial sesamoid; mild tarsometatarsal arthritis  XR Impression:  As above       Injection: Not applicable    Assessment:     Left insertional anterior tibial tendinitis    Plan:   The patient and I discussed the above assessment. We explored treatment options.     06/02/2025: Initial visit: Notations:  > 3-month history dorsal foot pain  Mild insertional anterior tibial tendinitis without deficiency  2-3 metatarsal base pain consistent

## 2025-06-27 ENCOUNTER — OFFICE VISIT (OUTPATIENT)
Dept: ORTHOPEDIC SURGERY | Age: 50
End: 2025-06-27

## 2025-06-27 DIAGNOSIS — M76.812 ANTERIOR TIBIAL TENDONITIS, LEFT: ICD-10-CM

## 2025-06-27 DIAGNOSIS — M79.672 LEFT FOOT PAIN: Primary | ICD-10-CM

## 2025-07-02 ENCOUNTER — OFFICE VISIT (OUTPATIENT)
Age: 50
End: 2025-07-02
Payer: COMMERCIAL

## 2025-07-02 DIAGNOSIS — M43.16 SPONDYLOLISTHESIS OF LUMBAR REGION: ICD-10-CM

## 2025-07-02 DIAGNOSIS — Q76.49 BERTOLOTTI'S SYNDROME: ICD-10-CM

## 2025-07-02 DIAGNOSIS — M47.816 FACET ARTHROPATHY, LUMBAR: Primary | ICD-10-CM

## 2025-07-02 PROCEDURE — 99213 OFFICE O/P EST LOW 20 MIN: CPT | Performed by: PHYSICIAN ASSISTANT

## 2025-07-02 NOTE — PROGRESS NOTES
Name: Latasha Sandhuambalvo  YOB: 1975  Gender: female  MRN: [id number]    CC: Follow-up (6 week follow up)       History of Present Illness  The patient is a 49-year-old female referred for low back and left hip pain.    She reports stiffness and pain in the left buttock, pain is worse, along with weakness when sitting. These symptoms have been present for one year and have not been relieved by chiropractic sessions. There is no numbness or tingling in the legs, but there is sometimes fatigue in the legs. The symptoms worsen with prolonged lying or sitting and improve with movement.  There was no particular injury that initiated the pain.       SOCIAL HISTORY  Exercise: Pilates    7/2/25  The patient returns for follow-up after physical therapy. She reports improvement, particularly after traction applied to her left leg while lying flat on her stomach, which she believes realigned her back.    Pilates exercises have been beneficial for her core, hips, and balance. She occasionally experiences fatigue-related leg pain.  Pain is still daily despite the physical therapy and chiropractic care and doing Pilates.  We did finally get the x-rays of the lumbar spine that the previously been done and we were not able to see them on the prior visit but that was from May 21, 2025 and I was able to review this today it did show spondylolisthesis L4-5 with facet arthropathy.    SOCIAL HISTORY  Exercise: Pilates               5/21/2025    11:46 AM   AMB PAIN ASSESSMENT   Location of Pain Back    Location Modifiers Left   Severity of Pain 5   Quality of Pain Aching   Duration of Pain Persistent   Frequency of Pain Intermittent   Date Pain First Started 5/17/2024   Aggravating Factors Walking   Limiting Behavior Some   Relieving Factors Exercise;Other (Comment)    Result of Injury No   Work-Related Injury No   Are there other pain locations you wish to document? No       Significant value

## 2025-07-15 NOTE — PROGRESS NOTES
Notations:  I believe her foot pain now isolates to the insertional ATT  She has no evidence of ATT deficiency  To protect at this stage, I recommend ASO speed wrap lace up ankle brace    Plan: MRI scan to ensure no ATT tearing  If no tearing, PT    Handout issued to obtain via internet: ASO speed wrap lace up ankle brace  I recommend Topical Magne Hemp Max rub with CBD, Magnesium, Arnica, Frankincense, Myrrh       Advanced medical imaging: Left ankle/hindfoot MRI scan: Assess degree of insertional ATT tendinitis at the level of the medial cuneiform; please ensure MRI scan captures the entire insertional region of the anterior tibial tendon  -------------------------------------------------------------------------------------------------------------------    07/29/2025:  Dr. Martin MRI scan reading reflects:  1.  Small volume fluid in the distal anterior tibialis tendon sheath. No tendinosis or tear.  2.  Mild edema of the plantar components of the spring ligament complex suggestive of low-grade sprain. No tear.  3.  Mild distal posterior tibialis insertional tendinosis. No tear. Small volume fluid in the tendon sheath.  4.  Mild peroneus longus tendinosis. No tear. Trace fluid in the tendon sheath.  5.  Trace fluid in the peroneus brevis tendon sheath.  6.  Small to moderate posterior subtalar joint effusion with mild synovitis.  7.  Ganglion cyst associated with the plantar aspect of the middle/lateral intercuneiform joint measuring 1.8 x 1.1 x 0.6 cm.  8.  Attenuated ATFL.     MRI scan revealed no definite ATT, PTT or peroneal tendon tearing  She has no pain or concerns around: Peroneal tendons: ATFL: Posterior subtalar joint: Intercuneiform:    I suspect her pain relates to a combination of insertional PTT, and insertional ATT and spring ligament strain    PT: No indication today    Handout issued recommending specific type of ASO speed wrap lace up ankle brace   prosthetics: Custom full-length arch

## 2025-07-23 ENCOUNTER — OFFICE VISIT (OUTPATIENT)
Age: 50
End: 2025-07-23
Payer: COMMERCIAL

## 2025-07-23 DIAGNOSIS — M43.00 PARS LYSIS: ICD-10-CM

## 2025-07-23 DIAGNOSIS — M47.816 FACET ARTHROPATHY, LUMBAR: ICD-10-CM

## 2025-07-23 DIAGNOSIS — M43.16 SPONDYLOLISTHESIS OF LUMBAR REGION: Primary | ICD-10-CM

## 2025-07-23 PROCEDURE — 99214 OFFICE O/P EST MOD 30 MIN: CPT | Performed by: PHYSICIAN ASSISTANT

## 2025-07-23 NOTE — PROGRESS NOTES
Name: Latasha Howardalvo  YOB: 1975  Gender: female  MRN: [id number]    CC: Results (MRI Lumbar spine)       History of Present Illness  The patient is a 49-year-old female referred for low back and left hip pain.    She reports stiffness and pain in the left buttock, pain is worse, along with weakness when sitting. These symptoms have been present for one year and have not been relieved by chiropractic sessions. There is no numbness or tingling in the legs, but there is sometimes fatigue in the legs. The symptoms worsen with prolonged lying or sitting and improve with movement.  There was no particular injury that initiated the pain.     She had physical therapy and reports improvement, particularly after traction applied to her left leg while lying flat on her stomach, which she believes realigned her back.  Despite physical therapy and Pilates, she is continued to have back pain across the back more soreness in the left buttock which she thought was more SI pain.  We did get to review prior x-rays from May 21, 2025 and  it did show spondylolisthesis L4-5 with facet arthropathy.    She had MRI of the lumbar spine.     She recalls a climbing accident in her 20s that resulted in a stress fracture in her spine, but she continued to climb afterwards.                 5/21/2025    11:46 AM   AMB PAIN ASSESSMENT   Location of Pain Back    Location Modifiers Left   Severity of Pain 5   Quality of Pain Aching   Duration of Pain Persistent   Frequency of Pain Intermittent   Date Pain First Started 5/17/2024   Aggravating Factors Walking   Limiting Behavior Some   Relieving Factors Exercise;Other (Comment)    Result of Injury No   Work-Related Injury No   Are there other pain locations you wish to document? No       Significant value              ROS/Meds/PSH/PMH/FH/SH: I personally reviewed the patient's collected intake data.  Below are the pertinents:    No Known Allergies      Current

## 2025-07-23 NOTE — PATIENT INSTRUCTIONS
Patient Education        Facet Joint Injection: Before Your Procedure  What is a facet joint injection?  A facet joint injection is a shot of medicine to help with pain from arthritis. The injection goes into your neck or back. Where you get your shot depends on where your pain is.  Facet joints connect your vertebrae to each other along the back of your spine. Problems in these joints can cause long-term (chronic) pain in the neck or back.  Numbing medicine is injected into the facet joint to see if that is the cause of your pain. If it does help your pain, your doctor may add a steroid medicine to the injection. Steroids reduce swelling and pain, but they don't always work.  How do you prepare for the procedure?  Procedures can be stressful. This information will help you understand what you can expect. And it will help you safely prepare for your procedure.  Preparing for the procedure    You may need to shower or bathe with a special soap the night before and the morning of your procedure. The soap contains chlorhexidine. It reduces the amount of bacteria on your skin that could cause an infection after the procedure.     Be sure you have someone to take you home. Anesthesia and pain medicine will make it unsafe for you to drive or get home on your own.     Understand exactly what procedure is planned, along with the risks, benefits, and other options.     If you take a medicine that prevents blood clots, your doctor may tell you to stop taking it before your procedure. Or your doctor may tell you to keep taking it. (These medicines include aspirin and other blood thinners.) Make sure that you understand exactly what your doctor wants you to do.     Tell your doctor ALL the medicines, vitamins, supplements, and herbal remedies you take. Some may increase the risk of problems during your procedure. Your doctor will tell you if you should stop taking any of them before the procedure and how soon to do it.     Make

## 2025-07-23 NOTE — PROGRESS NOTES
A referral for spine injection has been ordered.  A referral to non surgical spine doctor was ordered.

## 2025-07-29 ENCOUNTER — OFFICE VISIT (OUTPATIENT)
Dept: ORTHOPEDIC SURGERY | Age: 50
End: 2025-07-29
Payer: COMMERCIAL

## 2025-07-29 DIAGNOSIS — M79.672 LEFT FOOT PAIN: Primary | ICD-10-CM

## 2025-07-29 PROCEDURE — 99212 OFFICE O/P EST SF 10 MIN: CPT | Performed by: ORTHOPAEDIC SURGERY

## (undated) DEVICE — Device

## (undated) DEVICE — SUT ETHBND 2-0 30IN CT2 GRN --

## (undated) DEVICE — ADHESIVE LIQ H2O INSOLUBLE 3 CC LO RISK N STN MASTISOL

## (undated) DEVICE — [RESECTOR CUTTER, ARTHROSCOPIC SHAVER BLADE,  DO NOT RESTERILIZE,  DO NOT USE IF PACKAGE IS DAMAGED,  KEEP DRY,  KEEP AWAY FROM SUNLIGHT]: Brand: FORMULA

## (undated) DEVICE — SUTURE PDS II SZ 0 L27IN ABSRB VLT L26MM CT-2 1/2 CIR Z334H

## (undated) DEVICE — PRECISION THIN, OFFSET (5.5 X 0.38 X 25.0MM)

## (undated) DEVICE — OCCLUSIVE GAUZE STRIP,3% BISMUTH TRIBROMOPHENATE IN PETROLATUM BLEND: Brand: XEROFORM

## (undated) DEVICE — STERILE POLYISOPRENE POWDER-FREE SURGICAL GLOVES: Brand: PROTEXIS

## (undated) DEVICE — BUTTON SWITCH PENCIL BLADE ELECTRODE, HOLSTER: Brand: EDGE

## (undated) DEVICE — BLADE SCALP SURG BARD-PARK 11 --

## (undated) DEVICE — 4-PORT MANIFOLD: Brand: NEPTUNE 2

## (undated) DEVICE — OPTIFOAM GENTLE SA, POSTOP, 4X8: Brand: MEDLINE

## (undated) DEVICE — SURGICAL PROCEDURE PACK BASIC ST FRANCIS

## (undated) DEVICE — (D)PREP SKN CHLRAPRP APPL 26ML -- CONVERT TO ITEM 371833

## (undated) DEVICE — STERILE HOOK LOCK LATEX FREE ELASTIC BANDAGE 2INX5YD: Brand: HOOK LOCK™

## (undated) DEVICE — STOCKINETTE,IMPERVIOUS,12X48,STERILE: Brand: MEDLINE

## (undated) DEVICE — AMD ANTIMICROBIAL GAUZE SPONGES,12 PLY USP TYPE VII, 0.2% POLYHEXAMETHYLENE BIGUANIDE HCI (PHMB): Brand: CURITY

## (undated) DEVICE — 4.0MM ROUND BUR

## (undated) DEVICE — 1.2 RAP-PAC B LATEX FREE: Brand: RAP-PAC

## (undated) DEVICE — SUTURE MCRYL SZ 2-0 L27IN ABSRB UD CP-1 1 L36MM 1/2 CIR REV Y266H

## (undated) DEVICE — PREP SKN CHLRAPRP APL 26ML STR --

## (undated) DEVICE — INTENDED FOR TISSUE SEPARATION, AND OTHER PROCEDURES THAT REQUIRE A SHARP SURGICAL BLADE TO PUNCTURE OR CUT.: Brand: BARD-PARKER ® STAINLESS STEEL BLADES

## (undated) DEVICE — INFLOW CASSETTE TUBING, DO NOT USE IF PACKAGE IS DAMAGED: Brand: CROSSFLOW

## (undated) DEVICE — SLING ARM 2-37INX8-17IN PCH -- MED

## (undated) DEVICE — ZIMMER® STERILE DISPOSABLE TOURNIQUET CUFF, DUAL PORT, DUAL BLADDER, 18 IN. (46 CM)

## (undated) DEVICE — ZIP 8I SURGICAL SKIN CLOSURE DEVICE: Brand: ZIP 8I SURGICAL SKIN CLOSURE DEVICE

## (undated) DEVICE — SUTURE ETHLN SZ 4-0 L18IN NONABSORBABLE BLK L19MM PS-2 3/8 1667H

## (undated) DEVICE — STERILE HOOK LOCK LATEX FREE ELASTIC BANDAGE 6INX5YD: Brand: HOOK LOCK™

## (undated) DEVICE — SUTURE STRATAFIX SPRL MCRYL + SZ 3-0 L18IN ABSRB UD PS-2 SXMP1B107

## (undated) DEVICE — SUTURE PDS II SZ 2-0 L27IN ABSRB VLT L26MM CT-2 1/2 CIR Z333H

## (undated) DEVICE — AMD ANTIMICROBIAL BANDAGE ROLL,6 PLY: Brand: KERLIX

## (undated) DEVICE — CARDINAL HEALTH FLEXIBLE LIGHT HANDLE COVER: Brand: CARDINAL HEALTH

## (undated) DEVICE — SUTURE MCRYL SZ 4-0 L27IN ABSRB UD L19MM PS-2 1/2 CIR PRIM Y426H

## (undated) DEVICE — KNEE ARTHRO ACL-DR BAUMGARTEN: Brand: MEDLINE INDUSTRIES, INC.

## (undated) DEVICE — REM POLYHESIVE ADULT PATIENT RETURN ELECTRODE: Brand: VALLEYLAB

## (undated) DEVICE — 2DE12 2-0 UNDYD MONODERM 14X14: Brand: 2DE12 2-0 UNDYD MONODERM 14X14

## (undated) DEVICE — SOLUTION IV 1000ML 0.9% SOD CHL

## (undated) DEVICE — ZIMMER® STERILE DISPOSABLE TOURNIQUET CUFF WITH PLC, DUAL PORT, SINGLE BLADDER, 30 IN. (76 CM)

## (undated) DEVICE — SUTURE NONABSORBABLE BRAIDED 5-0 30 IN ETHBND EXCEL D7809

## (undated) DEVICE — CLEAR-TRAC SCR CANN 9MM LTX FREE: Brand: CLEAR-TRAC

## (undated) DEVICE — 2000CC GUARDIAN II: Brand: GUARDIAN

## (undated) DEVICE — ABDOMINAL PAD: Brand: DERMACEA

## (undated) DEVICE — SET IRRIG W 96IN TBNG 4 LN FLX BG

## (undated) DEVICE — BANDAGE COBAN 6 IN WND 6INX5YD FOAM

## (undated) DEVICE — SET IRRIG DST FLX M CONN

## (undated) DEVICE — STRIP,CLOSURE,WOUND,MEDI-STRIP,1/2X4: Brand: MEDLINE

## (undated) DEVICE — SOLUTION IRRIG 3000ML 0.9% SOD CHL FLX CONT 0797208] ICU MEDICAL INC]

## (undated) DEVICE — T-DRAPE,EXTREMITY,STERILE: Brand: MEDLINE

## (undated) DEVICE — PRECISION THIN (9.0 X 0.38 X 31.0MM)

## (undated) DEVICE — STRIP SKIN CLSR W1XL5IN NYL REINF CURAD

## (undated) DEVICE — OUTFLOW CASSETTE TUBING, DO NOT USE IF PACKAGE IS DAMAGED: Brand: CROSSFLOW

## (undated) DEVICE — BLADE OPHTH 60DEG BLU DBL BVL GRINDLESS SHRP 180DEG CUT